# Patient Record
Sex: FEMALE | Race: WHITE | NOT HISPANIC OR LATINO
[De-identification: names, ages, dates, MRNs, and addresses within clinical notes are randomized per-mention and may not be internally consistent; named-entity substitution may affect disease eponyms.]

---

## 2017-06-14 ENCOUNTER — NON-APPOINTMENT (OUTPATIENT)
Age: 82
End: 2017-06-14

## 2017-06-14 ENCOUNTER — APPOINTMENT (OUTPATIENT)
Dept: CARDIOLOGY | Facility: CLINIC | Age: 82
End: 2017-06-14

## 2017-06-14 VITALS
WEIGHT: 117 LBS | BODY MASS INDEX: 19.97 KG/M2 | HEART RATE: 76 BPM | SYSTOLIC BLOOD PRESSURE: 110 MMHG | HEIGHT: 64 IN | DIASTOLIC BLOOD PRESSURE: 70 MMHG

## 2017-06-14 DIAGNOSIS — Z80.0 FAMILY HISTORY OF MALIGNANT NEOPLASM OF DIGESTIVE ORGANS: ICD-10-CM

## 2017-06-14 DIAGNOSIS — Z86.79 PERSONAL HISTORY OF OTHER DISEASES OF THE CIRCULATORY SYSTEM: ICD-10-CM

## 2017-06-14 DIAGNOSIS — Z78.9 OTHER SPECIFIED HEALTH STATUS: ICD-10-CM

## 2017-06-14 DIAGNOSIS — Z63.4 DISAPPEARANCE AND DEATH OF FAMILY MEMBER: ICD-10-CM

## 2017-06-14 DIAGNOSIS — Z86.39 PERSONAL HISTORY OF OTHER ENDOCRINE, NUTRITIONAL AND METABOLIC DISEASE: ICD-10-CM

## 2017-06-14 DIAGNOSIS — Z82.49 FAMILY HISTORY OF ISCHEMIC HEART DISEASE AND OTHER DISEASES OF THE CIRCULATORY SYSTEM: ICD-10-CM

## 2017-06-14 SDOH — SOCIAL STABILITY - SOCIAL INSECURITY: DISSAPEARANCE AND DEATH OF FAMILY MEMBER: Z63.4

## 2017-06-23 ENCOUNTER — EMERGENCY (EMERGENCY)
Facility: HOSPITAL | Age: 82
LOS: 1 days | Discharge: HOME | End: 2017-06-23
Attending: EMERGENCY MEDICINE | Admitting: EMERGENCY MEDICINE
Payer: MEDICARE

## 2017-06-23 VITALS
DIASTOLIC BLOOD PRESSURE: 98 MMHG | HEART RATE: 78 BPM | TEMPERATURE: 98 F | OXYGEN SATURATION: 97 % | RESPIRATION RATE: 18 BRPM | SYSTOLIC BLOOD PRESSURE: 164 MMHG

## 2017-06-23 VITALS
RESPIRATION RATE: 20 BRPM | HEART RATE: 83 BPM | TEMPERATURE: 97 F | SYSTOLIC BLOOD PRESSURE: 163 MMHG | DIASTOLIC BLOOD PRESSURE: 111 MMHG | OXYGEN SATURATION: 97 %

## 2017-06-23 LAB
ALBUMIN SERPL ELPH-MCNC: 4.6 G/DL — SIGNIFICANT CHANGE UP (ref 3.3–5)
ALP SERPL-CCNC: 58 U/L — SIGNIFICANT CHANGE UP (ref 40–120)
ALT FLD-CCNC: 16 U/L RC — SIGNIFICANT CHANGE UP (ref 10–45)
ANION GAP SERPL CALC-SCNC: 12 MMOL/L — SIGNIFICANT CHANGE UP (ref 5–17)
APTT BLD: 59.3 SEC — HIGH (ref 27.5–37.4)
AST SERPL-CCNC: 20 U/L — SIGNIFICANT CHANGE UP (ref 10–40)
BASOPHILS # BLD AUTO: 0 K/UL — SIGNIFICANT CHANGE UP (ref 0–0.2)
BASOPHILS NFR BLD AUTO: 0 % — SIGNIFICANT CHANGE UP (ref 0–2)
BILIRUB SERPL-MCNC: 0.6 MG/DL — SIGNIFICANT CHANGE UP (ref 0.2–1.2)
BUN SERPL-MCNC: 27 MG/DL — HIGH (ref 7–23)
CALCIUM SERPL-MCNC: 10.2 MG/DL — SIGNIFICANT CHANGE UP (ref 8.4–10.5)
CHLORIDE SERPL-SCNC: 100 MMOL/L — SIGNIFICANT CHANGE UP (ref 96–108)
CO2 SERPL-SCNC: 29 MMOL/L — SIGNIFICANT CHANGE UP (ref 22–31)
CREAT SERPL-MCNC: 0.83 MG/DL — SIGNIFICANT CHANGE UP (ref 0.5–1.3)
EOSINOPHIL # BLD AUTO: 0 K/UL — SIGNIFICANT CHANGE UP (ref 0–0.5)
EOSINOPHIL NFR BLD AUTO: 0.9 % — SIGNIFICANT CHANGE UP (ref 0–6)
GLUCOSE SERPL-MCNC: 100 MG/DL — HIGH (ref 70–99)
HCT VFR BLD CALC: 40.6 % — SIGNIFICANT CHANGE UP (ref 34.5–45)
HGB BLD-MCNC: 13.7 G/DL — SIGNIFICANT CHANGE UP (ref 11.5–15.5)
INR BLD: 7.03 RATIO — CRITICAL HIGH (ref 0.88–1.16)
LYMPHOCYTES # BLD AUTO: 1.4 K/UL — SIGNIFICANT CHANGE UP (ref 1–3.3)
LYMPHOCYTES # BLD AUTO: 25.7 % — SIGNIFICANT CHANGE UP (ref 13–44)
MCHC RBC-ENTMCNC: 32.3 PG — SIGNIFICANT CHANGE UP (ref 27–34)
MCHC RBC-ENTMCNC: 33.7 GM/DL — SIGNIFICANT CHANGE UP (ref 32–36)
MCV RBC AUTO: 96.1 FL — SIGNIFICANT CHANGE UP (ref 80–100)
MONOCYTES # BLD AUTO: 0.7 K/UL — SIGNIFICANT CHANGE UP (ref 0–0.9)
MONOCYTES NFR BLD AUTO: 12.7 % — SIGNIFICANT CHANGE UP (ref 2–14)
NEUTROPHILS # BLD AUTO: 3.2 K/UL — SIGNIFICANT CHANGE UP (ref 1.8–7.4)
NEUTROPHILS NFR BLD AUTO: 60.7 % — SIGNIFICANT CHANGE UP (ref 43–77)
PLATELET # BLD AUTO: 178 K/UL — SIGNIFICANT CHANGE UP (ref 150–400)
POTASSIUM SERPL-MCNC: 4.4 MMOL/L — SIGNIFICANT CHANGE UP (ref 3.5–5.3)
POTASSIUM SERPL-SCNC: 4.4 MMOL/L — SIGNIFICANT CHANGE UP (ref 3.5–5.3)
PROT SERPL-MCNC: 7.5 G/DL — SIGNIFICANT CHANGE UP (ref 6–8.3)
PROTHROM AB SERPL-ACNC: 79 SEC — HIGH (ref 9.8–12.7)
RBC # BLD: 4.22 M/UL — SIGNIFICANT CHANGE UP (ref 3.8–5.2)
RBC # FLD: 12.8 % — SIGNIFICANT CHANGE UP (ref 10.3–14.5)
SODIUM SERPL-SCNC: 141 MMOL/L — SIGNIFICANT CHANGE UP (ref 135–145)
WBC # BLD: 5.3 K/UL — SIGNIFICANT CHANGE UP (ref 3.8–10.5)
WBC # FLD AUTO: 5.3 K/UL — SIGNIFICANT CHANGE UP (ref 3.8–10.5)

## 2017-06-23 PROCEDURE — 85027 COMPLETE CBC AUTOMATED: CPT

## 2017-06-23 PROCEDURE — 80053 COMPREHEN METABOLIC PANEL: CPT

## 2017-06-23 PROCEDURE — 85610 PROTHROMBIN TIME: CPT

## 2017-06-23 PROCEDURE — 99284 EMERGENCY DEPT VISIT MOD MDM: CPT

## 2017-06-23 PROCEDURE — 85730 THROMBOPLASTIN TIME PARTIAL: CPT

## 2017-06-23 PROCEDURE — 99283 EMERGENCY DEPT VISIT LOW MDM: CPT

## 2017-06-23 NOTE — ED PROVIDER NOTE - SKIN, MLM
+ large ecchymosis at R anterior shin; Skin normal color for race, warm, dry and intact. No evidence of rash.

## 2017-06-23 NOTE — ED PROVIDER NOTE - ATTENDING CONTRIBUTION TO CARE
****ATTENDING**** 88yo f hx Mitral valve repair on coumadin pw elevated INR. States she has a machine at home and checks her INR, spoke to her PCP Nurse in [place] who told her to go the ER. Denies any GI bleed, weakness, noticed RLE bruising. States she feels fine, and wants to go home. On exam, Patient is awake,alert,oriented x 3. Patient is well appearing and in no acute distress. Patient's chest is clear to ausculation, +s1s2. Abdomen is soft nd/nt +BS. RLE + mild ecchymosis no hematoma, non tender. no surrounding infection. Discussed plan w Dr. Marshall hold dose for 2 days and check on Sunday and pt to follow up with him. Discussed patient at length to monitor for bleeding and return for any concern. Pt states she understands and wants to go home. States she has not been drinking enough fluids.

## 2017-06-23 NOTE — ED PROVIDER NOTE - PLAN OF CARE
Stop coumadin until follow up. Follow up with Dr. Marshall in office for repeat lab work and evaluation on Monday.   Follow up with your cardiologist this week.   Return to ER for any chest pain, dizziness, shortness of breath, bloody stool, blood in urine, or any other concerning symptoms. Stop coumadin for today and tomorrow. Check INR on Sunday (third day) - If elevated, return to ER. If INR within therapeutic range, follow up with Dr. Marshall in office on Monday.   Follow up with your cardiologist this week.   Return to ER for any chest pain, dizziness, shortness of breath, bloody stool, blood in urine, or any other concerning symptoms. Stop coumadin for today and tomorrow. Check INR on Sunday (third day) - If elevated, return to ER. If INR within therapeutic range, follow up with Dr. Marshall in office on Monday.   Follow up with your cardiologist this week.   Return to ER for elevated INR, any chest pain, dizziness, shortness of breath, bloody stool, blood in urine, or any other concerning symptoms.

## 2017-06-23 NOTE — ED PROVIDER NOTE - OBJECTIVE STATEMENT
87F with PMH open heart surgery 2003 on coumadin sent by PCP for elevated INR today. Reports INR of 7. Reports elevated INR 3 months ago requiring vitamin K. Admits to large bruise on RLE. Patient feeling well. Denies CP, palpitations, abd pain, hematuria, melena.    PCP Alireza Marshall 87F with PMH open heart surgery 2003 for MV repair on coumadin sent by PCP for elevated INR today. Reports INR of 7. Reports elevated INR 3 months ago requiring vitamin K. Admits to large bruise on RLE. Patient feeling well. Denies CP, palpitations, abd pain, hematuria, melena.    PCP Alireza Marshall

## 2017-06-23 NOTE — ED PROVIDER NOTE - MEDICAL DECISION MAKING DETAILS
****ATTENDING**** 86yo f hx Mitral valve repair on coumadin pw elevated INR. States she has a machine at home and checks her INR, spoke to her PCP Nurse in [place] who told her to go the ER. Denies any GI bleed, weakness, noticed RLE bruising. States she feels fine, and wants to go home. On exam, Patient is awake,alert,oriented x 3. Patient is well appearing and in no acute distress. Patient's chest is clear to ausculation, +s1s2. Abdomen is soft nd/nt +BS. RLE + mild ecchymosis no hematoma, non tender. no surrounding infection. Discussed plan w Dr. Marshall hold dose for 2 days and check on Sunday and pt to follow up with him. Discussed patient at length to monitor for bleeding and return for any concern. Pt states she understands and wants to go home. States she has not been drinking enough fluids.

## 2017-06-23 NOTE — ED PROVIDER NOTE - PROGRESS NOTE DETAILS
paged Dr. Marshall Pt reports she monitors INR at home and reports value to lab in [place] where her cardiologist is. Spoke with Dr. Marshall. Met patient for first time last week. Agrees with plan to hold coumadin and f/u with him in office Monday.  - Kristin Campos PA-C

## 2017-06-23 NOTE — ED PROVIDER NOTE - CARE PLAN
Principal Discharge DX:	Elevated INR  Instructions for follow-up, activity and diet:	Stop coumadin until follow up. Follow up with Dr. Marshall in office for repeat lab work and evaluation on Monday.   Follow up with your cardiologist this week.   Return to ER for any chest pain, dizziness, shortness of breath, bloody stool, blood in urine, or any other concerning symptoms. Principal Discharge DX:	Elevated INR  Instructions for follow-up, activity and diet:	Stop coumadin for today and tomorrow. Check INR on Sunday (third day) - If elevated, return to ER. If INR within therapeutic range, follow up with Dr. Marshall in office on Monday.   Follow up with your cardiologist this week.   Return to ER for any chest pain, dizziness, shortness of breath, bloody stool, blood in urine, or any other concerning symptoms. Principal Discharge DX:	Elevated INR  Instructions for follow-up, activity and diet:	Stop coumadin for today and tomorrow. Check INR on Sunday (third day) - If elevated, return to ER. If INR within therapeutic range, follow up with Dr. Marshall in office on Monday.   Follow up with your cardiologist this week.   Return to ER for elevated INR, any chest pain, dizziness, shortness of breath, bloody stool, blood in urine, or any other concerning symptoms.

## 2017-06-23 NOTE — ED ADULT NURSE NOTE - OBJECTIVE STATEMENT
87 yr old female PMHx HTN, open heart surgery in 2003 on coumadin presents here today for high INR. patient was getting her regular blood work and they sent her here. denies any chest pain, sob, headache, dizziness, weakness, abd pain, n/v/d, rectal bleeding, vaginal bleeding, or hematuria. patient feels fine and does not want to stay in the hospital because she will get sick. safety and comfort maintained.

## 2017-07-03 ENCOUNTER — APPOINTMENT (OUTPATIENT)
Dept: INTERNAL MEDICINE | Facility: CLINIC | Age: 82
End: 2017-07-03

## 2017-07-03 VITALS — SYSTOLIC BLOOD PRESSURE: 120 MMHG | DIASTOLIC BLOOD PRESSURE: 70 MMHG

## 2017-07-03 DIAGNOSIS — K46.9 UNSPECIFIED ABDOMINAL HERNIA W/OUT OBSTRUCTION OR GANGRENE: ICD-10-CM

## 2017-07-03 DIAGNOSIS — G89.29 DORSALGIA, UNSPECIFIED: ICD-10-CM

## 2017-07-03 DIAGNOSIS — M54.9 DORSALGIA, UNSPECIFIED: ICD-10-CM

## 2017-07-26 ENCOUNTER — APPOINTMENT (OUTPATIENT)
Dept: OPHTHALMOLOGY | Facility: CLINIC | Age: 82
End: 2017-07-26

## 2017-07-26 DIAGNOSIS — H53.2 DIPLOPIA: ICD-10-CM

## 2017-07-31 ENCOUNTER — APPOINTMENT (OUTPATIENT)
Dept: INTERNAL MEDICINE | Facility: CLINIC | Age: 82
End: 2017-07-31
Payer: MEDICARE

## 2017-07-31 VITALS — BODY MASS INDEX: 20.66 KG/M2 | WEIGHT: 121 LBS | HEIGHT: 64 IN

## 2017-07-31 VITALS
DIASTOLIC BLOOD PRESSURE: 75 MMHG | WEIGHT: 121 LBS | SYSTOLIC BLOOD PRESSURE: 135 MMHG | RESPIRATION RATE: 16 BRPM | HEART RATE: 70 BPM | BODY MASS INDEX: 20.77 KG/M2

## 2017-07-31 LAB — ACRM BINDING ANTIBODY: 0 NMOL/L

## 2017-07-31 PROCEDURE — 99214 OFFICE O/P EST MOD 30 MIN: CPT

## 2017-07-31 RX ORDER — ARTIFICIAL TEARS 1; 2; 3 MG/ML; MG/ML; MG/ML
SOLUTION/ DROPS OPHTHALMIC
Refills: 0 | Status: DISCONTINUED | COMMUNITY

## 2017-07-31 RX ORDER — LEVOTHYROXINE SODIUM 100 UG/1
100 TABLET ORAL
Qty: 90 | Refills: 3 | Status: DISCONTINUED | COMMUNITY
Start: 2017-06-14 | End: 2017-07-31

## 2017-08-01 ENCOUNTER — APPOINTMENT (OUTPATIENT)
Dept: OPHTHALMOLOGY | Facility: CLINIC | Age: 82
End: 2017-08-01
Payer: MEDICARE

## 2017-08-01 DIAGNOSIS — H04.123 DRY EYE SYNDROME OF BILATERAL LACRIMAL GLANDS: ICD-10-CM

## 2017-08-01 DIAGNOSIS — Z98.42 CATARACT EXTRACTION STATUS, LEFT EYE: ICD-10-CM

## 2017-08-01 DIAGNOSIS — Z98.41 CATARACT EXTRACTION STATUS, RIGHT EYE: ICD-10-CM

## 2017-08-01 DIAGNOSIS — Z96.1 PRESENCE OF INTRAOCULAR LENS: ICD-10-CM

## 2017-08-01 LAB — MUSK ANTIBODY TEST: NORMAL

## 2017-08-01 PROCEDURE — V2718: CPT

## 2017-08-01 PROCEDURE — 92012 INTRM OPH EXAM EST PATIENT: CPT

## 2017-08-02 LAB
ACHR BLOCK AB SER QL: <15
ACHR MOD AB SER-ACNC: 21

## 2017-09-05 ENCOUNTER — APPOINTMENT (OUTPATIENT)
Dept: CARDIOLOGY | Facility: CLINIC | Age: 82
End: 2017-09-05
Payer: MEDICARE

## 2017-09-05 VITALS
BODY MASS INDEX: 19.63 KG/M2 | HEIGHT: 64 IN | DIASTOLIC BLOOD PRESSURE: 70 MMHG | WEIGHT: 115 LBS | SYSTOLIC BLOOD PRESSURE: 118 MMHG

## 2017-09-05 PROCEDURE — 99215 OFFICE O/P EST HI 40 MIN: CPT

## 2017-09-05 PROCEDURE — 93000 ELECTROCARDIOGRAM COMPLETE: CPT

## 2017-09-29 ENCOUNTER — APPOINTMENT (OUTPATIENT)
Dept: INTERNAL MEDICINE | Facility: CLINIC | Age: 82
End: 2017-09-29
Payer: MEDICARE

## 2017-09-29 PROCEDURE — 36415 COLL VENOUS BLD VENIPUNCTURE: CPT

## 2017-10-01 LAB
25(OH)D3 SERPL-MCNC: 41.8 NG/ML
ALBUMIN SERPL ELPH-MCNC: 4.1 G/DL
ALP BLD-CCNC: 59 U/L
ALT SERPL-CCNC: 20 U/L
ANION GAP SERPL CALC-SCNC: 16 MMOL/L
APPEARANCE: CLEAR
AST SERPL-CCNC: 22 U/L
BASOPHILS # BLD AUTO: 0.02 K/UL
BASOPHILS NFR BLD AUTO: 0.5 %
BILIRUB SERPL-MCNC: 0.7 MG/DL
BILIRUBIN URINE: NEGATIVE
BLOOD URINE: NEGATIVE
BUN SERPL-MCNC: 24 MG/DL
CALCIUM SERPL-MCNC: 9.6 MG/DL
CHLORIDE SERPL-SCNC: 101 MMOL/L
CHOLEST SERPL-MCNC: 145 MG/DL
CHOLEST/HDLC SERPL: 2.1 RATIO
CO2 SERPL-SCNC: 24 MMOL/L
COLOR: YELLOW
CREAT SERPL-MCNC: 0.88 MG/DL
EOSINOPHIL # BLD AUTO: 0.07 K/UL
EOSINOPHIL NFR BLD AUTO: 1.6 %
GLUCOSE QUALITATIVE U: NORMAL MG/DL
GLUCOSE SERPL-MCNC: 99 MG/DL
HBA1C MFR BLD HPLC: 5.6 %
HCT VFR BLD CALC: 39.8 %
HDLC SERPL-MCNC: 68 MG/DL
HGB BLD-MCNC: 12.3 G/DL
IMM GRANULOCYTES NFR BLD AUTO: 0 %
KETONES URINE: NEGATIVE
LDLC SERPL CALC-MCNC: 56 MG/DL
LEUKOCYTE ESTERASE URINE: NEGATIVE
LYMPHOCYTES # BLD AUTO: 1.06 K/UL
LYMPHOCYTES NFR BLD AUTO: 24.5 %
MAN DIFF?: NORMAL
MCHC RBC-ENTMCNC: 29.3 PG
MCHC RBC-ENTMCNC: 30.9 GM/DL
MCV RBC AUTO: 94.8 FL
MONOCYTES # BLD AUTO: 0.26 K/UL
MONOCYTES NFR BLD AUTO: 6 %
NEUTROPHILS # BLD AUTO: 2.91 K/UL
NEUTROPHILS NFR BLD AUTO: 67.4 %
NITRITE URINE: NEGATIVE
PH URINE: 6.5
PLATELET # BLD AUTO: 180 K/UL
POTASSIUM SERPL-SCNC: 4.8 MMOL/L
PROT SERPL-MCNC: 7 G/DL
PROTEIN URINE: NEGATIVE MG/DL
RBC # BLD: 4.2 M/UL
RBC # FLD: 14.8 %
SODIUM SERPL-SCNC: 141 MMOL/L
SPECIFIC GRAVITY URINE: 1.01
T4 SERPL-MCNC: 7.7 UG/DL
TRIGL SERPL-MCNC: 107 MG/DL
TSH SERPL-ACNC: 1.4 UIU/ML
UROBILINOGEN URINE: NORMAL MG/DL
WBC # FLD AUTO: 4.32 K/UL

## 2017-10-09 ENCOUNTER — NON-APPOINTMENT (OUTPATIENT)
Age: 82
End: 2017-10-09

## 2017-10-09 ENCOUNTER — APPOINTMENT (OUTPATIENT)
Dept: INTERNAL MEDICINE | Facility: CLINIC | Age: 82
End: 2017-10-09
Payer: MEDICARE

## 2017-10-09 VITALS
DIASTOLIC BLOOD PRESSURE: 70 MMHG | HEIGHT: 63 IN | RESPIRATION RATE: 16 BRPM | BODY MASS INDEX: 20.73 KG/M2 | HEART RATE: 70 BPM | WEIGHT: 117 LBS | SYSTOLIC BLOOD PRESSURE: 130 MMHG

## 2017-10-09 DIAGNOSIS — Z23 ENCOUNTER FOR IMMUNIZATION: ICD-10-CM

## 2017-10-09 DIAGNOSIS — Z84.89 FAMILY HISTORY OF OTHER SPECIFIED CONDITIONS: ICD-10-CM

## 2017-10-09 DIAGNOSIS — B19.20 UNSPECIFIED VIRAL HEPATITIS C W/OUT HEPATIC COMA: ICD-10-CM

## 2017-10-09 DIAGNOSIS — Z80.8 FAMILY HISTORY OF MALIGNANT NEOPLASM OF OTHER ORGANS OR SYSTEMS: ICD-10-CM

## 2017-10-09 DIAGNOSIS — H90.5 UNSPECIFIED SENSORINEURAL HEARING LOSS: ICD-10-CM

## 2017-10-09 PROCEDURE — 99497 ADVNCD CARE PLAN 30 MIN: CPT | Mod: 33

## 2017-10-09 PROCEDURE — G0439: CPT

## 2017-10-09 PROCEDURE — G0008: CPT

## 2017-10-09 PROCEDURE — 90662 IIV NO PRSV INCREASED AG IM: CPT

## 2017-10-09 PROCEDURE — 99213 OFFICE O/P EST LOW 20 MIN: CPT | Mod: 25

## 2017-10-09 PROCEDURE — 93000 ELECTROCARDIOGRAM COMPLETE: CPT

## 2017-10-09 PROCEDURE — 36415 COLL VENOUS BLD VENIPUNCTURE: CPT

## 2017-10-10 LAB
AFP-TM SERPL-MCNC: 4.5 NG/ML
HAV IGG+IGM SER QL: NONREACTIVE
HBV CORE IGG+IGM SER QL: NONREACTIVE
HBV SURFACE AB SER QL: NONREACTIVE
HBV SURFACE AG SER QL: NONREACTIVE
HCV AB SER QL: NONREACTIVE
HCV S/CO RATIO: 0.09 S/CO

## 2017-10-11 LAB
HCV RNA SERPL NAA DL=5-ACNC: NOT DETECTED IU/ML
HCV RNA SERPL NAA+PROBE-LOG IU: NOT DETECTED LOGIU/ML

## 2018-05-14 ENCOUNTER — MEDICATION RENEWAL (OUTPATIENT)
Age: 83
End: 2018-05-14

## 2018-05-17 ENCOUNTER — RX RENEWAL (OUTPATIENT)
Age: 83
End: 2018-05-17

## 2018-05-18 ENCOUNTER — EMERGENCY (EMERGENCY)
Facility: HOSPITAL | Age: 83
LOS: 1 days | Discharge: HOME | End: 2018-05-18
Attending: EMERGENCY MEDICINE
Payer: MEDICARE

## 2018-05-18 ENCOUNTER — NON-APPOINTMENT (OUTPATIENT)
Age: 83
End: 2018-05-18

## 2018-05-18 ENCOUNTER — APPOINTMENT (OUTPATIENT)
Dept: INTERNAL MEDICINE | Facility: CLINIC | Age: 83
End: 2018-05-18
Payer: MEDICARE

## 2018-05-18 VITALS — SYSTOLIC BLOOD PRESSURE: 120 MMHG | RESPIRATION RATE: 16 BRPM | DIASTOLIC BLOOD PRESSURE: 70 MMHG | HEART RATE: 76 BPM

## 2018-05-18 VITALS
DIASTOLIC BLOOD PRESSURE: 87 MMHG | TEMPERATURE: 98 F | RESPIRATION RATE: 17 BRPM | SYSTOLIC BLOOD PRESSURE: 143 MMHG | HEART RATE: 81 BPM | OXYGEN SATURATION: 96 %

## 2018-05-18 VITALS
SYSTOLIC BLOOD PRESSURE: 162 MMHG | DIASTOLIC BLOOD PRESSURE: 85 MMHG | RESPIRATION RATE: 17 BRPM | OXYGEN SATURATION: 99 % | HEART RATE: 96 BPM | TEMPERATURE: 99 F

## 2018-05-18 DIAGNOSIS — M54.5 LOW BACK PAIN: ICD-10-CM

## 2018-05-18 DIAGNOSIS — J30.2 OTHER SEASONAL ALLERGIC RHINITIS: ICD-10-CM

## 2018-05-18 DIAGNOSIS — G89.29 LOW BACK PAIN: ICD-10-CM

## 2018-05-18 DIAGNOSIS — Z95.2 PRESENCE OF PROSTHETIC HEART VALVE: Chronic | ICD-10-CM

## 2018-05-18 LAB
ALBUMIN SERPL ELPH-MCNC: 4.3 G/DL — SIGNIFICANT CHANGE UP (ref 3.3–5)
ALP SERPL-CCNC: 52 U/L — SIGNIFICANT CHANGE UP (ref 40–120)
ALT FLD-CCNC: 15 U/L — SIGNIFICANT CHANGE UP (ref 10–45)
ANION GAP SERPL CALC-SCNC: 11 MMOL/L — SIGNIFICANT CHANGE UP (ref 5–17)
APTT BLD: 36.7 SEC — SIGNIFICANT CHANGE UP (ref 27.5–37.4)
AST SERPL-CCNC: 17 U/L — SIGNIFICANT CHANGE UP (ref 10–40)
BASOPHILS # BLD AUTO: 0 K/UL — SIGNIFICANT CHANGE UP (ref 0–0.2)
BASOPHILS NFR BLD AUTO: 0.2 % — SIGNIFICANT CHANGE UP (ref 0–2)
BILIRUB SERPL-MCNC: 0.9 MG/DL — SIGNIFICANT CHANGE UP (ref 0.2–1.2)
BUN SERPL-MCNC: 19 MG/DL — SIGNIFICANT CHANGE UP (ref 7–23)
CALCIUM SERPL-MCNC: 9.7 MG/DL — SIGNIFICANT CHANGE UP (ref 8.4–10.5)
CHLORIDE SERPL-SCNC: 99 MMOL/L — SIGNIFICANT CHANGE UP (ref 96–108)
CO2 SERPL-SCNC: 26 MMOL/L — SIGNIFICANT CHANGE UP (ref 22–31)
CREAT SERPL-MCNC: 0.82 MG/DL — SIGNIFICANT CHANGE UP (ref 0.5–1.3)
DIGOXIN SERPL-MCNC: 0.6 NG/ML — LOW (ref 0.8–2)
EOSINOPHIL # BLD AUTO: 0 K/UL — SIGNIFICANT CHANGE UP (ref 0–0.5)
EOSINOPHIL NFR BLD AUTO: 0.5 % — SIGNIFICANT CHANGE UP (ref 0–6)
GAS PNL BLDV: SIGNIFICANT CHANGE UP
GLUCOSE SERPL-MCNC: 174 MG/DL — HIGH (ref 70–99)
HCT VFR BLD CALC: 43.6 % — SIGNIFICANT CHANGE UP (ref 34.5–45)
HGB BLD-MCNC: 14.1 G/DL — SIGNIFICANT CHANGE UP (ref 11.5–15.5)
INR BLD: 2.41 RATIO — HIGH (ref 0.88–1.16)
LYMPHOCYTES # BLD AUTO: 1.2 K/UL — SIGNIFICANT CHANGE UP (ref 1–3.3)
LYMPHOCYTES # BLD AUTO: 17.3 % — SIGNIFICANT CHANGE UP (ref 13–44)
MAGNESIUM SERPL-MCNC: 1.9 MG/DL — SIGNIFICANT CHANGE UP (ref 1.6–2.6)
MCHC RBC-ENTMCNC: 31.7 PG — SIGNIFICANT CHANGE UP (ref 27–34)
MCHC RBC-ENTMCNC: 32.4 GM/DL — SIGNIFICANT CHANGE UP (ref 32–36)
MCV RBC AUTO: 97.6 FL — SIGNIFICANT CHANGE UP (ref 80–100)
MONOCYTES # BLD AUTO: 0.5 K/UL — SIGNIFICANT CHANGE UP (ref 0–0.9)
MONOCYTES NFR BLD AUTO: 6.9 % — SIGNIFICANT CHANGE UP (ref 2–14)
NEUTROPHILS # BLD AUTO: 5.1 K/UL — SIGNIFICANT CHANGE UP (ref 1.8–7.4)
NEUTROPHILS NFR BLD AUTO: 75.1 % — SIGNIFICANT CHANGE UP (ref 43–77)
NT-PROBNP SERPL-SCNC: 4168 PG/ML — HIGH (ref 0–300)
PHOSPHATE SERPL-MCNC: 2.3 MG/DL — LOW (ref 2.5–4.5)
PLATELET # BLD AUTO: 175 K/UL — SIGNIFICANT CHANGE UP (ref 150–400)
POTASSIUM SERPL-MCNC: 3.9 MMOL/L — SIGNIFICANT CHANGE UP (ref 3.5–5.3)
POTASSIUM SERPL-SCNC: 3.9 MMOL/L — SIGNIFICANT CHANGE UP (ref 3.5–5.3)
PROT SERPL-MCNC: 7.4 G/DL — SIGNIFICANT CHANGE UP (ref 6–8.3)
PROTHROM AB SERPL-ACNC: 26.7 SEC — HIGH (ref 9.8–12.7)
RBC # BLD: 4.47 M/UL — SIGNIFICANT CHANGE UP (ref 3.8–5.2)
RBC # FLD: 12.5 % — SIGNIFICANT CHANGE UP (ref 10.3–14.5)
SODIUM SERPL-SCNC: 136 MMOL/L — SIGNIFICANT CHANGE UP (ref 135–145)
TROPONIN T SERPL-MCNC: <0.01 NG/ML — SIGNIFICANT CHANGE UP (ref 0–0.06)
WBC # BLD: 6.8 K/UL — SIGNIFICANT CHANGE UP (ref 3.8–10.5)
WBC # FLD AUTO: 6.8 K/UL — SIGNIFICANT CHANGE UP (ref 3.8–10.5)

## 2018-05-18 PROCEDURE — 71045 X-RAY EXAM CHEST 1 VIEW: CPT

## 2018-05-18 PROCEDURE — 83735 ASSAY OF MAGNESIUM: CPT

## 2018-05-18 PROCEDURE — 93010 ELECTROCARDIOGRAM REPORT: CPT

## 2018-05-18 PROCEDURE — 84484 ASSAY OF TROPONIN QUANT: CPT

## 2018-05-18 PROCEDURE — 82947 ASSAY GLUCOSE BLOOD QUANT: CPT

## 2018-05-18 PROCEDURE — 93005 ELECTROCARDIOGRAM TRACING: CPT

## 2018-05-18 PROCEDURE — 85014 HEMATOCRIT: CPT

## 2018-05-18 PROCEDURE — 85610 PROTHROMBIN TIME: CPT

## 2018-05-18 PROCEDURE — 93000 ELECTROCARDIOGRAM COMPLETE: CPT

## 2018-05-18 PROCEDURE — 80053 COMPREHEN METABOLIC PANEL: CPT

## 2018-05-18 PROCEDURE — 83605 ASSAY OF LACTIC ACID: CPT

## 2018-05-18 PROCEDURE — 71045 X-RAY EXAM CHEST 1 VIEW: CPT | Mod: 26

## 2018-05-18 PROCEDURE — 82435 ASSAY OF BLOOD CHLORIDE: CPT

## 2018-05-18 PROCEDURE — 85027 COMPLETE CBC AUTOMATED: CPT

## 2018-05-18 PROCEDURE — 84132 ASSAY OF SERUM POTASSIUM: CPT

## 2018-05-18 PROCEDURE — 82330 ASSAY OF CALCIUM: CPT

## 2018-05-18 PROCEDURE — 80162 ASSAY OF DIGOXIN TOTAL: CPT

## 2018-05-18 PROCEDURE — 85730 THROMBOPLASTIN TIME PARTIAL: CPT

## 2018-05-18 PROCEDURE — 99284 EMERGENCY DEPT VISIT MOD MDM: CPT | Mod: 25

## 2018-05-18 PROCEDURE — 99214 OFFICE O/P EST MOD 30 MIN: CPT | Mod: 25

## 2018-05-18 PROCEDURE — 84295 ASSAY OF SERUM SODIUM: CPT

## 2018-05-18 PROCEDURE — 93288 INTERROG EVL PM/LDLS PM IP: CPT | Mod: 26

## 2018-05-18 PROCEDURE — 83880 ASSAY OF NATRIURETIC PEPTIDE: CPT

## 2018-05-18 PROCEDURE — 84100 ASSAY OF PHOSPHORUS: CPT

## 2018-05-18 PROCEDURE — 82803 BLOOD GASES ANY COMBINATION: CPT

## 2018-05-18 PROCEDURE — 99285 EMERGENCY DEPT VISIT HI MDM: CPT | Mod: 25,GC

## 2018-05-18 RX ORDER — SODIUM CHLORIDE 9 MG/ML
500 INJECTION INTRAMUSCULAR; INTRAVENOUS; SUBCUTANEOUS ONCE
Qty: 0 | Refills: 0 | Status: COMPLETED | OUTPATIENT
Start: 2018-05-18 | End: 2018-05-18

## 2018-05-18 RX ADMIN — SODIUM CHLORIDE 500 MILLILITER(S): 9 INJECTION INTRAMUSCULAR; INTRAVENOUS; SUBCUTANEOUS at 14:21

## 2018-05-18 NOTE — ED PROVIDER NOTE - MEDICAL DECISION MAKING DETAILS
88yo F with palpitations, chest discomfort, diarrhoea. Cardiac workup and device interrogation without abnormal findings of concern. Pt clinically well, no further episodes of discomfort or diarrhoea. Pt discussed with Cardiologist Dr Marshall. Discharge home with cardiology follow up.

## 2018-05-18 NOTE — ED ADULT NURSE NOTE - OBJECTIVE STATEMENT
Pt is an ambulatory 87 yr old female a/oX 3 c/o weakness and several episodes of diarrhea last night.  Was seen at cardiologist this morning and told she had abnormal EKG and to come to ED.  PErrl wnl, anthony withe qual strength.  Sinus rhythm with pacemaker on cm at 87 bpm.  Pt states she was having palpitations and chest pain last night at rest when diarrhea was happening.  No N/v/F.  Abdomen NT ND with BS+4Q.  SKIN WDI.  PEripehral pulses +2bl non pitting edema

## 2018-05-18 NOTE — ED PROVIDER NOTE - ATTENDING CONTRIBUTION TO CARE
attending Michela: 87yF h/o MV surgery 2003, PPM 2001, presents after episode of palpitations and chest discomfort last night x several hours. Also with 1 day of loose stools, nonbloody. Asymptomatic in ED. Sent in for eval by PMD [facility] for "abnormal EKG". Reports normal stress test in [place] last month. Denies recent abx, vomiting, abdominal pain. Does endorse generalized weakness, pt believes to dehydration. On exam, well-appearing, NAD, MMM, pink conjunctiva, abdomen soft/NT, normal gait. Will obtain ekg, place on tele, labs including cardiac enzymes/lytes, device interrogation, discuss with cardiology and reassess.

## 2018-05-18 NOTE — ED ADULT NURSE REASSESSMENT NOTE - NS ED NURSE REASSESS COMMENT FT1
Received report from [facility]. Pt A&Ox3, resting, VS stable. Safety checked. No c/o pain or discomfort at this time. Comfort care provided. Pt encouraged to call for assist when needed. pending xray and blood results

## 2018-05-18 NOTE — ASSESSMENT
[FreeTextEntry1] : Pt with above problems\par Last night did not feel well with diarrhea and vague chest discomfort\par EKG with new changes\par To go to ER - Notified\par

## 2018-05-18 NOTE — REVIEW OF SYSTEMS
[Palpitations] : palpitations [Diarrhea] : diarrhea [Negative] : Genitourinary [Chest Pain] : no chest pain

## 2018-05-18 NOTE — ED PROVIDER NOTE - NS ED ROS FT
REVIEW OF SYSTEMS:    CONSTITUTIONAL: +weakness, no fevers or chills  EYES/ENT: No visual changes;  No vertigo or throat pain   NECK: No pain or stiffness  RESPIRATORY: +cough, no wheezing, hemoptysis; No shortness of breath  CARDIOVASCULAR: +chest pain +palpitations  GASTROINTESTINAL: No abdominal or epigastric pain. No nausea, vomiting, or hematemesis; +diarrhea, no constipation. No melena or hematochezia.  GENITOURINARY: No dysuria, frequency or hematuria  NEUROLOGICAL: No numbness or weakness  PSYCH: AAO x 3, normal mood and affect  SKIN: No rashes or lesions

## 2018-05-18 NOTE — PROCEDURE NOTE - ADDITIONAL PROCEDURE DETAILS
Indication: pt c/o palpitation last night and this morning on 5/18  - presenting rhythm: A-paced, V-sensed at rate 70s-80s  - measured data: WNL (atrial amplitude not able to measure), normal PPM function (no Atrial threshold measured due to high atrial rate)  - Between 1/2/2018 and 5/17/2018, stored data revealed  multiple high atrial rate 150 bpm to > 400 bpm lasting 1min 57 sec to 12 hrs 52min 53 sec, 1 episode of high ventricular rate at 167 bpm on 1/22/2018 lasting 4 sec, available EGMs c/w Afib with RVR.   - changes made: none     Sharan-Monica White   # [id number]

## 2018-05-18 NOTE — HISTORY OF PRESENT ILLNESS
[FreeTextEntry1] : FU for hypertension, hyperlipidemia, chronic low back pain, anxiety - insomnia\par Bad allergies right now\par Last night did not feel well - diarrhea and unclear if she had any chest discomfort.

## 2018-05-18 NOTE — ED PROVIDER NOTE - PROGRESS NOTE DETAILS
labs, CXR, cardiac enzymes all normal/acceptable. No further episodes of diarrhoea or chest pain/palpitations. PPM interrogated with no concerning findings, no events related to symptoms. Discussed with Cardiologist Dr Marshall. Will discharge with Cardiology follow up this week. attending Michela: results reviewed with patient and cardiologist Joanna. Pt offered observation/admission for symptoms but patient declines, wants to go home. Will dc with copy of results, close cardiology follow-up and strict return precautions.

## 2018-05-18 NOTE — ED PROVIDER NOTE - OBJECTIVE STATEMENT
88 yo F PHx MV surgery 2003, PPM 2001, hayfever, OA, pw palpitations and chest discomfort lasting for hours from 4pm yesterday, she took a tablet of some kind, and then a  sleeping tablet before bed. Overnight she has 8 episodes of diarrhoea and the palpitations had resolved. No abdominal pain, and no hematochezia. Recent cardiac stress test in April in [place]. No previous MI, no DM, no HTN, no HCL, no FHx of heart disease. Pt saw her internist today who referred her for EKG that was different - EKG is paced. Pt feels weak, has ongoing diarrhoea. No current CP/palpitations, no SOB, diaphoresis, nausea or vomiting.    Meds: digoxin, coumadin, furosemide, KCl, metoprolol, tramadol, atorvastatin, pantoprazole, vesicare, lorazepam, levothyroxine, Flonase +unspecified med to take with palpitations  IM Bipin Luque  Cardio Jose Marshall

## 2018-05-18 NOTE — ED PROVIDER NOTE - CHIEF COMPLAINT
The patient is a 87y Female complaining of arm pain/injury. The patient is a 87y Female complaining of palpitations and diarrhoea.

## 2018-05-18 NOTE — ED PROVIDER NOTE - CARDIAC, MLM
Normal rate, irregular rhythm.  Heart sounds S1, S2.  No murmurs, rubs or gallops. No JVD, +1 edema bilaterally

## 2018-05-18 NOTE — PHYSICAL EXAM
[No Acute Distress] : no acute distress [Well Nourished] : well nourished [Well Developed] : well developed [Normal Sclera/Conjunctiva] : normal sclera/conjunctiva [Normal Oropharynx] : the oropharynx was normal [No JVD] : no jugular venous distention [Supple] : supple [No Lymphadenopathy] : no lymphadenopathy [No Respiratory Distress] : no respiratory distress  [Clear to Auscultation] : lungs were clear to auscultation bilaterally [No Accessory Muscle Use] : no accessory muscle use [Normal S1, S2] : normal S1 and S2 [No Murmur] : no murmur heard [No Edema] : there was no peripheral edema [Soft] : abdomen soft [No HSM] : no HSM [No Focal Deficits] : no focal deficits [Alert and Oriented x3] : oriented to person, place, and time

## 2018-05-21 ENCOUNTER — MEDICATION RENEWAL (OUTPATIENT)
Age: 83
End: 2018-05-21

## 2018-05-21 ENCOUNTER — RX RENEWAL (OUTPATIENT)
Age: 83
End: 2018-05-21

## 2018-06-14 ENCOUNTER — NON-APPOINTMENT (OUTPATIENT)
Age: 83
End: 2018-06-14

## 2018-06-14 ENCOUNTER — APPOINTMENT (OUTPATIENT)
Dept: CARDIOLOGY | Facility: CLINIC | Age: 83
End: 2018-06-14
Payer: MEDICARE

## 2018-06-14 VITALS
HEART RATE: 76 BPM | DIASTOLIC BLOOD PRESSURE: 80 MMHG | SYSTOLIC BLOOD PRESSURE: 142 MMHG | BODY MASS INDEX: 20.37 KG/M2 | WEIGHT: 115 LBS

## 2018-06-14 PROCEDURE — 93000 ELECTROCARDIOGRAM COMPLETE: CPT

## 2018-06-14 PROCEDURE — 99214 OFFICE O/P EST MOD 30 MIN: CPT

## 2018-06-15 ENCOUNTER — APPOINTMENT (OUTPATIENT)
Dept: INTERNAL MEDICINE | Facility: CLINIC | Age: 83
End: 2018-06-15
Payer: MEDICARE

## 2018-06-15 ENCOUNTER — APPOINTMENT (OUTPATIENT)
Dept: INTERNAL MEDICINE | Facility: CLINIC | Age: 83
End: 2018-06-15

## 2018-06-15 DIAGNOSIS — Z23 ENCOUNTER FOR IMMUNIZATION: ICD-10-CM

## 2018-06-15 PROCEDURE — 90471 IMMUNIZATION ADMIN: CPT | Mod: GY

## 2018-06-15 PROCEDURE — 90714 TD VACC NO PRESV 7 YRS+ IM: CPT | Mod: GY

## 2018-06-26 ENCOUNTER — OUTPATIENT (OUTPATIENT)
Dept: OUTPATIENT SERVICES | Facility: HOSPITAL | Age: 83
LOS: 1 days | End: 2018-06-26
Payer: MEDICARE

## 2018-06-26 ENCOUNTER — APPOINTMENT (OUTPATIENT)
Dept: RADIOLOGY | Facility: IMAGING CENTER | Age: 83
End: 2018-06-26
Payer: MEDICARE

## 2018-06-26 ENCOUNTER — MEDICATION RENEWAL (OUTPATIENT)
Age: 83
End: 2018-06-26

## 2018-06-26 DIAGNOSIS — Z00.8 ENCOUNTER FOR OTHER GENERAL EXAMINATION: ICD-10-CM

## 2018-06-26 DIAGNOSIS — Z95.2 PRESENCE OF PROSTHETIC HEART VALVE: Chronic | ICD-10-CM

## 2018-06-26 PROCEDURE — 73120 X-RAY EXAM OF HAND: CPT

## 2018-06-26 PROCEDURE — 73120 X-RAY EXAM OF HAND: CPT | Mod: 26,50

## 2018-07-03 ENCOUNTER — MEDICATION RENEWAL (OUTPATIENT)
Age: 83
End: 2018-07-03

## 2018-07-17 PROBLEM — Z95.0 PRESENCE OF CARDIAC PACEMAKER: Chronic | Status: ACTIVE | Noted: 2018-05-18

## 2018-07-25 PROBLEM — Z80.0 FAMILY HISTORY OF COLON CANCER: Status: ACTIVE | Noted: 2017-06-14

## 2018-07-25 PROBLEM — J30.2 SEASONAL ALLERGIES: Status: ACTIVE | Noted: 2018-05-18

## 2018-07-25 PROBLEM — M19.90 UNSPECIFIED OSTEOARTHRITIS, UNSPECIFIED SITE: Chronic | Status: ACTIVE | Noted: 2018-05-18

## 2018-07-30 ENCOUNTER — TRANSCRIPTION ENCOUNTER (OUTPATIENT)
Age: 83
End: 2018-07-30

## 2018-07-31 ENCOUNTER — TRANSCRIPTION ENCOUNTER (OUTPATIENT)
Age: 83
End: 2018-07-31

## 2018-08-06 ENCOUNTER — MEDICATION RENEWAL (OUTPATIENT)
Age: 83
End: 2018-08-06

## 2018-08-13 ENCOUNTER — APPOINTMENT (OUTPATIENT)
Dept: INTERNAL MEDICINE | Facility: CLINIC | Age: 83
End: 2018-08-13
Payer: MEDICARE

## 2018-08-13 VITALS — BODY MASS INDEX: 20.73 KG/M2 | WEIGHT: 117 LBS | HEIGHT: 63 IN

## 2018-08-13 VITALS — SYSTOLIC BLOOD PRESSURE: 140 MMHG | HEART RATE: 72 BPM | DIASTOLIC BLOOD PRESSURE: 80 MMHG | RESPIRATION RATE: 14 BRPM

## 2018-08-13 PROCEDURE — 85610 PROTHROMBIN TIME: CPT | Mod: QW

## 2018-08-13 PROCEDURE — 99213 OFFICE O/P EST LOW 20 MIN: CPT | Mod: 25

## 2018-08-13 PROCEDURE — 36415 COLL VENOUS BLD VENIPUNCTURE: CPT

## 2018-08-13 NOTE — HISTORY OF PRESENT ILLNESS
[FreeTextEntry8] : Pt presents for evaluation of black stool for one day. Stool was "partially black" yesterday, and black this AM\par NO abdominal pain.\par No BRBPR.\par No fever/chills.\par INR on Wed was 2.3.\par NO trauma.\par NO change in diet.\par Pt brought in sample of stool.

## 2018-08-13 NOTE — ASSESSMENT
[FreeTextEntry1] : Blood work was drawn and sent to the lab today. The patient has been instructed to call the office Wed. to discuss today's lab work.\par \par Stool sample brought in today was negative for blood on guaic testing.\par \par Observe for now.\par \par F/U if symptoms do not resolve, or if they should change/worsen.\par \par

## 2018-08-13 NOTE — PHYSICAL EXAM
[No Acute Distress] : no acute distress [Supple] : supple [No Respiratory Distress] : no respiratory distress  [Clear to Auscultation] : lungs were clear to auscultation bilaterally [No Accessory Muscle Use] : no accessory muscle use [Normal Rate] : normal rate  [Regular Rhythm] : with a regular rhythm [Normal S1, S2] : normal S1 and S2 [No Edema] : there was no peripheral edema [Soft] : abdomen soft [Non Tender] : non-tender [Non-distended] : non-distended [No HSM] : no HSM [Normal Bowel Sounds] : normal bowel sounds

## 2018-08-14 LAB
BASOPHILS # BLD AUTO: 0.02 K/UL
BASOPHILS NFR BLD AUTO: 0.3 %
EOSINOPHIL # BLD AUTO: 0.03 K/UL
EOSINOPHIL NFR BLD AUTO: 0.5 %
HCT VFR BLD CALC: 43.6 %
HGB BLD-MCNC: 13 G/DL
IMM GRANULOCYTES NFR BLD AUTO: 0.2 %
LYMPHOCYTES # BLD AUTO: 1.02 K/UL
LYMPHOCYTES NFR BLD AUTO: 17 %
MAN DIFF?: NORMAL
MCHC RBC-ENTMCNC: 28.8 PG
MCHC RBC-ENTMCNC: 29.8 GM/DL
MCV RBC AUTO: 96.7 FL
MONOCYTES # BLD AUTO: 0.72 K/UL
MONOCYTES NFR BLD AUTO: 12 %
NEUTROPHILS # BLD AUTO: 4.19 K/UL
NEUTROPHILS NFR BLD AUTO: 70 %
PLATELET # BLD AUTO: 206 K/UL
RBC # BLD: 4.51 M/UL
RBC # FLD: 15.7 %
WBC # FLD AUTO: 5.99 K/UL

## 2018-08-22 ENCOUNTER — RX RENEWAL (OUTPATIENT)
Age: 83
End: 2018-08-22

## 2018-08-23 ENCOUNTER — RX RENEWAL (OUTPATIENT)
Age: 83
End: 2018-08-23

## 2018-08-28 ENCOUNTER — APPOINTMENT (OUTPATIENT)
Dept: DERMATOLOGY | Facility: CLINIC | Age: 83
End: 2018-08-28
Payer: MEDICARE

## 2018-08-28 VITALS
HEIGHT: 63 IN | WEIGHT: 117 LBS | SYSTOLIC BLOOD PRESSURE: 100 MMHG | BODY MASS INDEX: 20.73 KG/M2 | DIASTOLIC BLOOD PRESSURE: 80 MMHG

## 2018-08-28 DIAGNOSIS — Z12.83 ENCOUNTER FOR SCREENING FOR MALIGNANT NEOPLASM OF SKIN: ICD-10-CM

## 2018-08-28 DIAGNOSIS — L72.0 EPIDERMAL CYST: ICD-10-CM

## 2018-08-28 DIAGNOSIS — Z87.09 PERSONAL HISTORY OF OTHER DISEASES OF THE RESPIRATORY SYSTEM: ICD-10-CM

## 2018-08-28 DIAGNOSIS — L57.0 ACTINIC KERATOSIS: ICD-10-CM

## 2018-08-28 DIAGNOSIS — Z80.8 FAMILY HISTORY OF MALIGNANT NEOPLASM OF OTHER ORGANS OR SYSTEMS: ICD-10-CM

## 2018-08-28 DIAGNOSIS — Z91.89 OTHER SPECIFIED PERSONAL RISK FACTORS, NOT ELSEWHERE CLASSIFIED: ICD-10-CM

## 2018-08-28 DIAGNOSIS — Z85.820 PERSONAL HISTORY OF MALIGNANT MELANOMA OF SKIN: ICD-10-CM

## 2018-08-28 DIAGNOSIS — Z80.9 FAMILY HISTORY OF MALIGNANT NEOPLASM, UNSPECIFIED: ICD-10-CM

## 2018-08-28 DIAGNOSIS — H91.90 UNSPECIFIED HEARING LOSS, UNSPECIFIED EAR: ICD-10-CM

## 2018-08-28 DIAGNOSIS — L82.1 OTHER SEBORRHEIC KERATOSIS: ICD-10-CM

## 2018-08-28 DIAGNOSIS — D18.01 HEMANGIOMA OF SKIN AND SUBCUTANEOUS TISSUE: ICD-10-CM

## 2018-08-28 DIAGNOSIS — Z86.79 PERSONAL HISTORY OF OTHER DISEASES OF THE CIRCULATORY SYSTEM: ICD-10-CM

## 2018-08-28 DIAGNOSIS — Z87.39 PERSONAL HISTORY OF OTHER DISEASES OF THE MUSCULOSKELETAL SYSTEM AND CONNECTIVE TISSUE: ICD-10-CM

## 2018-08-28 DIAGNOSIS — H54.7 UNSPECIFIED VISUAL LOSS: ICD-10-CM

## 2018-08-28 PROCEDURE — 99204 OFFICE O/P NEW MOD 45 MIN: CPT

## 2018-09-01 ENCOUNTER — MEDICATION RENEWAL (OUTPATIENT)
Age: 83
End: 2018-09-01

## 2018-09-20 ENCOUNTER — MEDICATION RENEWAL (OUTPATIENT)
Age: 83
End: 2018-09-20

## 2018-09-20 ENCOUNTER — RX RENEWAL (OUTPATIENT)
Age: 83
End: 2018-09-20

## 2018-09-28 ENCOUNTER — RX RENEWAL (OUTPATIENT)
Age: 83
End: 2018-09-28

## 2018-10-02 ENCOUNTER — APPOINTMENT (OUTPATIENT)
Dept: CARDIOLOGY | Facility: CLINIC | Age: 83
End: 2018-10-02
Payer: MEDICARE

## 2018-10-02 ENCOUNTER — RX RENEWAL (OUTPATIENT)
Age: 83
End: 2018-10-02

## 2018-10-02 VITALS
HEART RATE: 82 BPM | DIASTOLIC BLOOD PRESSURE: 68 MMHG | HEIGHT: 63 IN | TEMPERATURE: 97.7 F | OXYGEN SATURATION: 95 % | SYSTOLIC BLOOD PRESSURE: 140 MMHG | WEIGHT: 121 LBS | BODY MASS INDEX: 21.44 KG/M2

## 2018-10-02 VITALS — SYSTOLIC BLOOD PRESSURE: 128 MMHG | DIASTOLIC BLOOD PRESSURE: 70 MMHG

## 2018-10-02 PROCEDURE — 93280 PM DEVICE PROGR EVAL DUAL: CPT

## 2018-10-02 PROCEDURE — 99214 OFFICE O/P EST MOD 30 MIN: CPT

## 2018-10-05 ENCOUNTER — APPOINTMENT (OUTPATIENT)
Dept: DERMATOLOGY | Facility: CLINIC | Age: 83
End: 2018-10-05
Payer: MEDICARE

## 2018-10-05 VITALS — SYSTOLIC BLOOD PRESSURE: 110 MMHG | DIASTOLIC BLOOD PRESSURE: 70 MMHG

## 2018-10-05 PROCEDURE — 99213 OFFICE O/P EST LOW 20 MIN: CPT

## 2018-10-05 RX ORDER — FLUOCINONIDE 0.5 MG/ML
0.05 SOLUTION TOPICAL
Qty: 1 | Refills: 3 | Status: ACTIVE | COMMUNITY
Start: 2018-10-05 | End: 1900-01-01

## 2018-10-12 DIAGNOSIS — N32.89 OTHER SPECIFIED DISORDERS OF BLADDER: ICD-10-CM

## 2019-07-08 ENCOUNTER — APPOINTMENT (OUTPATIENT)
Dept: INTERNAL MEDICINE | Facility: CLINIC | Age: 84
End: 2019-07-08
Payer: MEDICARE

## 2019-07-08 VITALS
RESPIRATION RATE: 16 BRPM | WEIGHT: 124 LBS | DIASTOLIC BLOOD PRESSURE: 80 MMHG | BODY MASS INDEX: 21.97 KG/M2 | HEIGHT: 63 IN | HEART RATE: 76 BPM | SYSTOLIC BLOOD PRESSURE: 130 MMHG

## 2019-07-08 DIAGNOSIS — L29.9 PRURITUS, UNSPECIFIED: ICD-10-CM

## 2019-07-08 PROCEDURE — 99214 OFFICE O/P EST MOD 30 MIN: CPT

## 2019-07-08 NOTE — ASSESSMENT
[FreeTextEntry1] : Pt stable\par Pt will send recent labs here\par Can try hydrocortisone cream for itch but may be related to hair dyes or sprays  -not willing to give them up.\par Continue meds\par FU in [place].\par

## 2019-07-08 NOTE — HISTORY OF PRESENT ILLNESS
[FreeTextEntry1] : FU for hypertension, afib, hyperlipidemia, hypothyroidism\par Itchy scalp.\par Gets most of her care in [place] - including her physicals\par Coumadin managed by doctors in [place]\par \par

## 2019-07-08 NOTE — PHYSICAL EXAM
[No Acute Distress] : no acute distress [Well Nourished] : well nourished [Well Developed] : well developed [Well-Appearing] : well-appearing [Normal Sclera/Conjunctiva] : normal sclera/conjunctiva [Normal Oropharynx] : the oropharynx was normal [No JVD] : no jugular venous distention [No Lymphadenopathy] : no lymphadenopathy [Supple] : supple [No Respiratory Distress] : no respiratory distress  [Clear to Auscultation] : lungs were clear to auscultation bilaterally [No Accessory Muscle Use] : no accessory muscle use [Normal Rate] : normal rate  [Regular Rhythm] : with a regular rhythm [Normal S1, S2] : normal S1 and S2 [Soft] : abdomen soft [Non Tender] : non-tender [Non-distended] : non-distended [No HSM] : no HSM [No Rash] : no rash [de-identified] : trace edema on left [de-identified] : DIPESH [de-identified] : No distinct rash on scalp

## 2019-07-12 ENCOUNTER — RX RENEWAL (OUTPATIENT)
Age: 84
End: 2019-07-12

## 2019-07-15 ENCOUNTER — APPOINTMENT (OUTPATIENT)
Dept: CARDIOLOGY | Facility: CLINIC | Age: 84
End: 2019-07-15
Payer: MEDICARE

## 2019-07-15 ENCOUNTER — NON-APPOINTMENT (OUTPATIENT)
Age: 84
End: 2019-07-15

## 2019-07-15 VITALS
WEIGHT: 122 LBS | OXYGEN SATURATION: 93 % | HEIGHT: 63 IN | BODY MASS INDEX: 21.62 KG/M2 | HEART RATE: 84 BPM | DIASTOLIC BLOOD PRESSURE: 83 MMHG | SYSTOLIC BLOOD PRESSURE: 134 MMHG

## 2019-07-15 PROCEDURE — 93280 PM DEVICE PROGR EVAL DUAL: CPT

## 2019-07-15 PROCEDURE — 99215 OFFICE O/P EST HI 40 MIN: CPT

## 2019-07-15 PROCEDURE — 93000 ELECTROCARDIOGRAM COMPLETE: CPT | Mod: 59

## 2019-07-15 NOTE — REASON FOR VISIT
[Initial Evaluation] : an initial evaluation of [Atrial Fibrillation] : atrial fibrillation [Coronary Artery Disease] : coronary artery disease [Pacemaker Evaluation] : pacemaker ~T evaluation ~C was performed [FreeTextEntry1] : status post mitral valve repair, and (?) Tricuspid valve repair

## 2019-09-23 ENCOUNTER — APPOINTMENT (OUTPATIENT)
Dept: CARDIOLOGY | Facility: CLINIC | Age: 84
End: 2019-09-23
Payer: MEDICARE

## 2019-09-23 ENCOUNTER — NON-APPOINTMENT (OUTPATIENT)
Age: 84
End: 2019-09-23

## 2019-09-23 VITALS
DIASTOLIC BLOOD PRESSURE: 80 MMHG | OXYGEN SATURATION: 93 % | WEIGHT: 123 LBS | HEIGHT: 63 IN | BODY MASS INDEX: 21.79 KG/M2 | HEART RATE: 121 BPM | SYSTOLIC BLOOD PRESSURE: 115 MMHG

## 2019-09-23 DIAGNOSIS — Z79.899 OTHER LONG TERM (CURRENT) DRUG THERAPY: ICD-10-CM

## 2019-09-23 PROCEDURE — 93280 PM DEVICE PROGR EVAL DUAL: CPT

## 2019-09-23 PROCEDURE — 99215 OFFICE O/P EST HI 40 MIN: CPT

## 2019-09-23 PROCEDURE — 93000 ELECTROCARDIOGRAM COMPLETE: CPT | Mod: 59

## 2019-09-23 PROCEDURE — 93306 TTE W/DOPPLER COMPLETE: CPT

## 2019-09-23 RX ORDER — DIGOXIN 250 UG/1
250 TABLET ORAL
Refills: 0 | Status: DISCONTINUED | COMMUNITY
Start: 2017-06-14 | End: 2019-09-23

## 2019-09-23 NOTE — HISTORY OF PRESENT ILLNESS
[FreeTextEntry1] : She presents in scheduled followup reporting that she has been feeling well. "good summer.  Rcounts episode of blood in stool a few months ago, which, in fact occurred > 1yr ago. \par \par Edema is fairy well controlled with the use of compression stockings.  \par \par No c/o chest, throat,jaw, arm or upper back discomfort.  No dyspnea, orthopnea or PND.  No Palpitations, dizziness or syncope.  No claudication.\par \par No anticoagulation related bleeding problems.\par \par Submits a list of meds which includes dilt 120 mg qd and no longer lists digoxin 0.25 mg qd.

## 2019-09-23 NOTE — REASON FOR VISIT
[Initial Evaluation] : an initial evaluation of [Atrial Fibrillation] : atrial fibrillation [Pacemaker Evaluation] : pacemaker ~T evaluation ~C was performed [Coronary Artery Disease] : coronary artery disease [FreeTextEntry1] : status post mitral valve repair, and (?) Tricuspid valve repair

## 2019-09-25 LAB
ALBUMIN SERPL ELPH-MCNC: 4.4 G/DL
ALP BLD-CCNC: 76 U/L
ALT SERPL-CCNC: 20 U/L
ANION GAP SERPL CALC-SCNC: 18 MMOL/L
AST SERPL-CCNC: 19 U/L
BASOPHILS # BLD AUTO: 0.04 K/UL
BASOPHILS NFR BLD AUTO: 0.8 %
BILIRUB SERPL-MCNC: 0.8 MG/DL
BUN SERPL-MCNC: 32 MG/DL
CALCIUM SERPL-MCNC: 9.9 MG/DL
CHLORIDE SERPL-SCNC: 101 MMOL/L
CHOLEST SERPL-MCNC: 125 MG/DL
CHOLEST/HDLC SERPL: 1.8 RATIO
CO2 SERPL-SCNC: 25 MMOL/L
CREAT SERPL-MCNC: 1.06 MG/DL
DIGOXIN SERPL-MCNC: <0.3 NG/ML
EOSINOPHIL # BLD AUTO: 0.05 K/UL
EOSINOPHIL NFR BLD AUTO: 1 %
GLUCOSE SERPL-MCNC: 54 MG/DL
HCT VFR BLD CALC: 42.9 %
HDLC SERPL-MCNC: 71 MG/DL
HGB BLD-MCNC: 13.1 G/DL
IMM GRANULOCYTES NFR BLD AUTO: 0.2 %
LDLC SERPL CALC-MCNC: 34 MG/DL
LYMPHOCYTES # BLD AUTO: 0.94 K/UL
LYMPHOCYTES NFR BLD AUTO: 19.5 %
MAGNESIUM SERPL-MCNC: 2 MG/DL
MAN DIFF?: NORMAL
MCHC RBC-ENTMCNC: 30.3 PG
MCHC RBC-ENTMCNC: 30.5 GM/DL
MCV RBC AUTO: 99.1 FL
MONOCYTES # BLD AUTO: 0.48 K/UL
MONOCYTES NFR BLD AUTO: 10 %
NEUTROPHILS # BLD AUTO: 3.3 K/UL
NEUTROPHILS NFR BLD AUTO: 68.5 %
PLATELET # BLD AUTO: 144 K/UL
POTASSIUM SERPL-SCNC: 4.9 MMOL/L
PROT SERPL-MCNC: 6.6 G/DL
RBC # BLD: 4.33 M/UL
RBC # FLD: 14.5 %
SODIUM SERPL-SCNC: 144 MMOL/L
TRIGL SERPL-MCNC: 99 MG/DL
TSH SERPL-ACNC: 1.3 UIU/ML
WBC # FLD AUTO: 4.82 K/UL

## 2019-10-07 ENCOUNTER — NON-APPOINTMENT (OUTPATIENT)
Age: 84
End: 2019-10-07

## 2019-10-07 ENCOUNTER — APPOINTMENT (OUTPATIENT)
Dept: CARDIOLOGY | Facility: CLINIC | Age: 84
End: 2019-10-07
Payer: MEDICARE

## 2019-10-07 VITALS
HEIGHT: 63 IN | DIASTOLIC BLOOD PRESSURE: 78 MMHG | WEIGHT: 124 LBS | BODY MASS INDEX: 21.97 KG/M2 | SYSTOLIC BLOOD PRESSURE: 126 MMHG | HEART RATE: 78 BPM

## 2019-10-07 PROCEDURE — 99214 OFFICE O/P EST MOD 30 MIN: CPT

## 2019-10-07 PROCEDURE — 93000 ELECTROCARDIOGRAM COMPLETE: CPT

## 2019-10-07 NOTE — HISTORY OF PRESENT ILLNESS
[FreeTextEntry1] : She presents in scheduled followup reporting that she has been feeling well.  Rare palpitations at at bedtime.\par \par Edema (left greater than right) is fairy well controlled with the use of compression stockings.  \par \par No c/o chest, throat,jaw, arm or upper back discomfort.  No dyspnea, orthopnea or PND.  No Palpitations, dizziness or syncope.  No claudication.\par \par No anticoagulation related bleeding problems.\par \par Submits a list of meds which includes dilt 120 mg qd and no longer lists digoxin 0.25 mg qd.

## 2019-10-07 NOTE — REASON FOR VISIT
[Initial Evaluation] : an initial evaluation of [Coronary Artery Disease] : coronary artery disease [Atrial Fibrillation] : atrial fibrillation [Pacemaker Evaluation] : pacemaker ~T evaluation ~C was performed [FreeTextEntry1] : status post mitral valve repair, and (?) Tricuspid valve repair

## 2019-10-30 ENCOUNTER — APPOINTMENT (OUTPATIENT)
Dept: CARDIOLOGY | Facility: CLINIC | Age: 84
End: 2019-10-30

## 2020-08-05 ENCOUNTER — APPOINTMENT (OUTPATIENT)
Dept: CARDIOLOGY | Facility: CLINIC | Age: 85
End: 2020-08-05

## 2020-08-06 ENCOUNTER — NON-APPOINTMENT (OUTPATIENT)
Age: 85
End: 2020-08-06

## 2020-08-06 ENCOUNTER — APPOINTMENT (OUTPATIENT)
Dept: CARDIOLOGY | Facility: CLINIC | Age: 85
End: 2020-08-06
Payer: MEDICARE

## 2020-08-06 VITALS
OXYGEN SATURATION: 95 % | DIASTOLIC BLOOD PRESSURE: 80 MMHG | SYSTOLIC BLOOD PRESSURE: 126 MMHG | WEIGHT: 121 LBS | HEART RATE: 81 BPM | BODY MASS INDEX: 21.43 KG/M2

## 2020-08-06 PROCEDURE — 36415 COLL VENOUS BLD VENIPUNCTURE: CPT

## 2020-08-06 PROCEDURE — 99214 OFFICE O/P EST MOD 30 MIN: CPT

## 2020-08-06 PROCEDURE — 93280 PM DEVICE PROGR EVAL DUAL: CPT

## 2020-08-06 PROCEDURE — 93000 ELECTROCARDIOGRAM COMPLETE: CPT | Mod: 59

## 2020-08-06 NOTE — HISTORY OF PRESENT ILLNESS
[FreeTextEntry1] : She presents in scheduled followup reporting that she has been feeling well. \par \par Edema (left greater than right) is well controlled with the use of compression stockings.  \par \par No c/o chest, throat,jaw, arm or upper back discomfort.  No dyspnea, orthopnea or PND.  No Palpitations, dizziness or syncope.  No claudication.\par \par No anticoagulation related bleeding problems.\par \par In the fall, labs and pacemaker interrogation in [place] were unremarkable.

## 2020-08-07 LAB
ALBUMIN SERPL ELPH-MCNC: 4.7 G/DL
ALP BLD-CCNC: 64 U/L
ALT SERPL-CCNC: 21 U/L
ANION GAP SERPL CALC-SCNC: 15 MMOL/L
AST SERPL-CCNC: 20 U/L
BASOPHILS # BLD AUTO: 0.04 K/UL
BASOPHILS NFR BLD AUTO: 0.8 %
BILIRUB SERPL-MCNC: 0.7 MG/DL
BUN SERPL-MCNC: 25 MG/DL
CALCIUM SERPL-MCNC: 9.8 MG/DL
CHLORIDE SERPL-SCNC: 102 MMOL/L
CHOLEST SERPL-MCNC: 143 MG/DL
CHOLEST/HDLC SERPL: 1.7 RATIO
CO2 SERPL-SCNC: 24 MMOL/L
CREAT SERPL-MCNC: 0.89 MG/DL
EOSINOPHIL # BLD AUTO: 0.05 K/UL
EOSINOPHIL NFR BLD AUTO: 1 %
GLUCOSE SERPL-MCNC: 85 MG/DL
HCT VFR BLD CALC: 42.5 %
HDLC SERPL-MCNC: 83 MG/DL
HGB BLD-MCNC: 13.1 G/DL
IMM GRANULOCYTES NFR BLD AUTO: 0.2 %
LDLC SERPL CALC-MCNC: 46 MG/DL
LDLC SERPL DIRECT ASSAY-MCNC: 51 MG/DL
LYMPHOCYTES # BLD AUTO: 1.23 K/UL
LYMPHOCYTES NFR BLD AUTO: 24.6 %
MAN DIFF?: NORMAL
MCHC RBC-ENTMCNC: 30.8 GM/DL
MCHC RBC-ENTMCNC: 31 PG
MCV RBC AUTO: 100.7 FL
MONOCYTES # BLD AUTO: 0.7 K/UL
MONOCYTES NFR BLD AUTO: 14 %
NEUTROPHILS # BLD AUTO: 2.98 K/UL
NEUTROPHILS NFR BLD AUTO: 59.4 %
PLATELET # BLD AUTO: 159 K/UL
POTASSIUM SERPL-SCNC: 4.7 MMOL/L
PROT SERPL-MCNC: 6.7 G/DL
RBC # BLD: 4.22 M/UL
RBC # FLD: 14 %
SARS-COV-2 IGG SERPL IA-ACNC: 0.09 INDEX
SARS-COV-2 IGG SERPL QL IA: NEGATIVE
SODIUM SERPL-SCNC: 141 MMOL/L
TRIGL SERPL-MCNC: 68 MG/DL
TSH SERPL-ACNC: 0.94 UIU/ML
WBC # FLD AUTO: 5.01 K/UL

## 2020-08-11 ENCOUNTER — NON-APPOINTMENT (OUTPATIENT)
Age: 85
End: 2020-08-11

## 2020-08-11 ENCOUNTER — APPOINTMENT (OUTPATIENT)
Dept: INTERNAL MEDICINE | Facility: CLINIC | Age: 85
End: 2020-08-11
Payer: MEDICARE

## 2020-08-11 VITALS
BODY MASS INDEX: 22.32 KG/M2 | WEIGHT: 126 LBS | DIASTOLIC BLOOD PRESSURE: 80 MMHG | TEMPERATURE: 94.1 F | HEART RATE: 70 BPM | SYSTOLIC BLOOD PRESSURE: 120 MMHG | HEIGHT: 63 IN | RESPIRATION RATE: 16 BRPM

## 2020-08-11 VITALS — HEART RATE: 86 BPM | OXYGEN SATURATION: 96 %

## 2020-08-11 DIAGNOSIS — K92.1 MELENA: ICD-10-CM

## 2020-08-11 DIAGNOSIS — G47.00 INSOMNIA, UNSPECIFIED: ICD-10-CM

## 2020-08-11 PROCEDURE — G0439: CPT

## 2020-08-11 PROCEDURE — G0444 DEPRESSION SCREEN ANNUAL: CPT | Mod: 59

## 2020-08-11 PROCEDURE — 93000 ELECTROCARDIOGRAM COMPLETE: CPT | Mod: 59

## 2020-08-11 PROCEDURE — 99497 ADVNCD CARE PLAN 30 MIN: CPT | Mod: 33

## 2020-08-11 PROCEDURE — 99214 OFFICE O/P EST MOD 30 MIN: CPT | Mod: 25

## 2020-08-11 RX ORDER — PROPRANOLOL HYDROCHLORIDE 20 MG/1
20 TABLET ORAL
Qty: 90 | Refills: 3 | Status: DISCONTINUED | COMMUNITY
Start: 2017-06-14 | End: 2020-08-11

## 2020-08-11 NOTE — PHYSICAL EXAM
[No Acute Distress] : no acute distress [Well Nourished] : well nourished [Well Developed] : well developed [Well-Appearing] : well-appearing [Normal Sclera/Conjunctiva] : normal sclera/conjunctiva [PERRL] : pupils equal round and reactive to light [EOMI] : extraocular movements intact [Normal Outer Ear/Nose] : the outer ears and nose were normal in appearance [Normal Oropharynx] : the oropharynx was normal [Normal TMs] : both tympanic membranes were normal [No JVD] : no jugular venous distention [No Lymphadenopathy] : no lymphadenopathy [Supple] : supple [No Respiratory Distress] : no respiratory distress  [Thyroid Normal, No Nodules] : the thyroid was normal and there were no nodules present [No Accessory Muscle Use] : no accessory muscle use [Clear to Auscultation] : lungs were clear to auscultation bilaterally [Regular Rhythm] : with a regular rhythm [Normal Rate] : normal rate  [Normal S1, S2] : normal S1 and S2 [No Murmur] : no murmur heard [No Carotid Bruits] : no carotid bruits [No Abdominal Bruit] : a ~M bruit was not heard ~T in the abdomen [No Varicosities] : no varicosities [Pedal Pulses Present] : the pedal pulses are present [No Palpable Aorta] : no palpable aorta [No Extremity Clubbing/Cyanosis] : no extremity clubbing/cyanosis [Normal Appearance] : normal in appearance [No Nipple Discharge] : no nipple discharge [Soft] : abdomen soft [No Axillary Lymphadenopathy] : no axillary lymphadenopathy [Non Tender] : non-tender [No Masses] : no abdominal mass palpated [Non-distended] : non-distended [No HSM] : no HSM [Declined Rectal Exam] : declined rectal exam [Normal Bowel Sounds] : normal bowel sounds [Normal Posterior Cervical Nodes] : no posterior cervical lymphadenopathy [Normal Anterior Cervical Nodes] : no anterior cervical lymphadenopathy [No CVA Tenderness] : no CVA  tenderness [No Joint Swelling] : no joint swelling [No Spinal Tenderness] : no spinal tenderness [No Rash] : no rash [Grossly Normal Strength/Tone] : grossly normal strength/tone [Coordination Grossly Intact] : coordination grossly intact [Normal Gait] : normal gait [No Focal Deficits] : no focal deficits [Normal Affect] : the affect was normal [Normal Insight/Judgement] : insight and judgment were intact [Normal] : affect was normal and insight and judgment were intact [de-identified] : Declines [de-identified] : tr-1+ lymphedema at ankles [de-identified] : Ecchymoses

## 2020-08-11 NOTE — HISTORY OF PRESENT ILLNESS
[FreeTextEntry1] : Well visit and follow up for management of:\par Hypertension - on meds\par Hyperlipidemia - on meds\par Insomnia - on meds

## 2020-08-11 NOTE — REVIEW OF SYSTEMS
[Hearing Loss] : hearing loss [Negative] : Psychiatric [FreeTextEntry5] : see HPI [de-identified] : sees derm

## 2020-08-11 NOTE — ASSESSMENT
[FreeTextEntry1] : Pt with above medical issues which requires extra work in addition to the well visit.\par Labs reviewed - from Dr. Marshall\par Meds adjusted\par Health counseling.\par FU 6 months.\par

## 2020-08-11 NOTE — HEALTH RISK ASSESSMENT
[Good] : ~his/her~  mood as  good [Yes] : Yes [] : No [2 - 3 times a week (3 pts)] : 2 - 3  times a week (3 points) [Never (0 pts)] : Never (0 points) [1 or 2 (0 pts)] : 1 or 2 (0 points) [No falls in past year] : Patient reported no falls in the past year [de-identified] : Low salt and fat [de-identified] : Walking [Change in mental status noted] : No change in mental status noted [None] : None [Alone] : lives alone [Retired] : retired [Feels Safe at Home] : Feels safe at home [] :  [Fully functional (using the telephone, shopping, preparing meals, housekeeping, doing laundry, using] : Fully functional and needs no help or supervision to perform IADLs (using the telephone, shopping, preparing meals, housekeeping, doing laundry, using transportation, managing medications and managing finances) [Fully functional (bathing, dressing, toileting, transferring, walking, feeding)] : Fully functional (bathing, dressing, toileting, transferring, walking, feeding) [Smoke Detector] : smoke detector [Carbon Monoxide Detector] : carbon monoxide detector [Seat Belt] :  uses seat belt [Sunscreen] : uses sunscreen [Designated Healthcare Proxy] : Designated healthcare proxy [With Patient/Caregiver] : With Patient/Caregiver [Name: ___] : Health Care Proxy's Name: [unfilled]  [FreeTextEntry4] : Had long discussion with the patient.\par Discussed with pt the need for a health care proxy.\par Discussed who can be a proxy, forms given if needed, and the need for the proxy to know their wishes and to make sure they will comply.\par The proxy will need to have a copy in their home and the patient should have a copy.\par \par  [AdvancecareDate] : 08/20

## 2020-08-18 ENCOUNTER — NON-APPOINTMENT (OUTPATIENT)
Age: 85
End: 2020-08-18

## 2020-08-18 ENCOUNTER — APPOINTMENT (OUTPATIENT)
Dept: OPHTHALMOLOGY | Facility: CLINIC | Age: 85
End: 2020-08-18
Payer: MEDICARE

## 2020-08-18 PROCEDURE — 92015 DETERMINE REFRACTIVE STATE: CPT

## 2020-08-18 PROCEDURE — 92060 SENSORIMOTOR EXAMINATION: CPT

## 2020-08-18 PROCEDURE — 92012 INTRM OPH EXAM EST PATIENT: CPT

## 2020-09-02 ENCOUNTER — NON-APPOINTMENT (OUTPATIENT)
Age: 85
End: 2020-09-02

## 2020-09-08 ENCOUNTER — APPOINTMENT (OUTPATIENT)
Dept: INTERNAL MEDICINE | Facility: CLINIC | Age: 85
End: 2020-09-08
Payer: MEDICARE

## 2020-09-08 PROCEDURE — 90662 IIV NO PRSV INCREASED AG IM: CPT

## 2020-09-08 PROCEDURE — G0008: CPT

## 2020-09-25 ENCOUNTER — APPOINTMENT (OUTPATIENT)
Dept: INTERNAL MEDICINE | Facility: CLINIC | Age: 85
End: 2020-09-25
Payer: MEDICARE

## 2020-09-25 ENCOUNTER — MED ADMIN CHARGE (OUTPATIENT)
Age: 85
End: 2020-09-25

## 2020-09-25 PROCEDURE — 90732 PPSV23 VACC 2 YRS+ SUBQ/IM: CPT

## 2020-09-25 PROCEDURE — G0009: CPT

## 2020-11-16 ENCOUNTER — RX RENEWAL (OUTPATIENT)
Age: 85
End: 2020-11-16

## 2020-12-07 ENCOUNTER — APPOINTMENT (OUTPATIENT)
Dept: CARDIOLOGY | Facility: CLINIC | Age: 85
End: 2020-12-07
Payer: MEDICARE

## 2020-12-07 ENCOUNTER — NON-APPOINTMENT (OUTPATIENT)
Age: 85
End: 2020-12-07

## 2020-12-07 VITALS
HEART RATE: 83 BPM | SYSTOLIC BLOOD PRESSURE: 150 MMHG | WEIGHT: 125 LBS | OXYGEN SATURATION: 96 % | DIASTOLIC BLOOD PRESSURE: 86 MMHG | BODY MASS INDEX: 22.14 KG/M2 | TEMPERATURE: 97.9 F

## 2020-12-07 VITALS — DIASTOLIC BLOOD PRESSURE: 68 MMHG | SYSTOLIC BLOOD PRESSURE: 122 MMHG

## 2020-12-07 PROCEDURE — 93000 ELECTROCARDIOGRAM COMPLETE: CPT | Mod: 59

## 2020-12-07 PROCEDURE — 93280 PM DEVICE PROGR EVAL DUAL: CPT

## 2020-12-07 PROCEDURE — 99214 OFFICE O/P EST MOD 30 MIN: CPT

## 2020-12-07 RX ORDER — DILTIAZEM HYDROCHLORIDE 120 MG/1
120 CAPSULE, EXTENDED RELEASE ORAL
Qty: 180 | Refills: 0 | Status: DISCONTINUED | COMMUNITY
Start: 2019-09-23 | End: 2020-12-07

## 2020-12-07 RX ORDER — WARFARIN 2.5 MG/1
2.5 TABLET ORAL
Qty: 90 | Refills: 1 | Status: DISCONTINUED | COMMUNITY
Start: 2017-06-14 | End: 2020-12-07

## 2020-12-07 NOTE — HISTORY OF PRESENT ILLNESS
[FreeTextEntry1] : She presents in scheduled followup reporting that she has been feeling well.  Inactive during the pandemic.  Much stress related to her sister's progressive dementia.\par \par Edema (left greater than right) is well controlled with the use of compression stockings.  \par \par No c/o chest, throat,jaw, arm or upper back discomfort.  No dyspnea, orthopnea or PND.  No Palpitations, dizziness or syncope.  No claudication.\par \par No anticoagulation related bleeding problems other than easy bruising\par \par Dr. Gray around September.  He changed warfarin to Xarelto.  Laboratory studies were drawn and she believes that echocardiography was also performed.\par \par

## 2020-12-29 ENCOUNTER — NON-APPOINTMENT (OUTPATIENT)
Age: 85
End: 2020-12-29

## 2021-01-07 ENCOUNTER — APPOINTMENT (OUTPATIENT)
Dept: INTERNAL MEDICINE | Facility: CLINIC | Age: 86
End: 2021-01-07
Payer: MEDICARE

## 2021-01-07 VITALS — TEMPERATURE: 97.6 F

## 2021-01-07 VITALS — HEART RATE: 72 BPM | SYSTOLIC BLOOD PRESSURE: 130 MMHG | DIASTOLIC BLOOD PRESSURE: 70 MMHG | RESPIRATION RATE: 16 BRPM

## 2021-01-07 PROCEDURE — 99213 OFFICE O/P EST LOW 20 MIN: CPT

## 2021-01-07 NOTE — ASSESSMENT
[FreeTextEntry1] : Pt with right sided rib pain after fall\par Likely a bruise - can not ro fracture\par Could do Xrays but will not  and pt declines\par To use heat and Tylenol.

## 2021-01-07 NOTE — HISTORY OF PRESENT ILLNESS
[FreeTextEntry1] : Fell 1.5 weeks ago - slipped on carpet and fell against right side of hospital bed - No LOC\par Did not hit head\par No pleuritic pain\par Hurts to touch\par Slightly better\par Not SOB\par

## 2021-01-07 NOTE — PHYSICAL EXAM
[Normal] : no acute distress, well nourished, well developed and well-appearing [Normal Sclera/Conjunctiva] : normal sclera/conjunctiva [No Respiratory Distress] : no respiratory distress  [No Accessory Muscle Use] : no accessory muscle use [Clear to Auscultation] : lungs were clear to auscultation bilaterally [Normal Rate] : normal rate  [Regular Rhythm] : with a regular rhythm [Normal S1, S2] : normal S1 and S2 [Normal Appearance] : normal in appearance [No Nipple Discharge] : no nipple discharge [No Axillary Lymphadenopathy] : no axillary lymphadenopathy [Soft] : abdomen soft [Non-distended] : non-distended [No Masses] : no abdominal mass palpated [No HSM] : no HSM [de-identified] : Good breath sounds bilaterally [de-identified] : Mild rib pain on right - axillary line.

## 2021-02-08 ENCOUNTER — APPOINTMENT (OUTPATIENT)
Dept: OPHTHALMOLOGY | Facility: CLINIC | Age: 86
End: 2021-02-08
Payer: MEDICARE

## 2021-02-08 ENCOUNTER — NON-APPOINTMENT (OUTPATIENT)
Age: 86
End: 2021-02-08

## 2021-02-08 PROCEDURE — 92060 SENSORIMOTOR EXAMINATION: CPT

## 2021-02-08 PROCEDURE — 92012 INTRM OPH EXAM EST PATIENT: CPT

## 2021-02-26 ENCOUNTER — NON-APPOINTMENT (OUTPATIENT)
Age: 86
End: 2021-02-26

## 2021-03-01 ENCOUNTER — RX RENEWAL (OUTPATIENT)
Age: 86
End: 2021-03-01

## 2021-03-12 ENCOUNTER — RX RENEWAL (OUTPATIENT)
Age: 86
End: 2021-03-12

## 2021-04-13 ENCOUNTER — APPOINTMENT (OUTPATIENT)
Dept: INTERNAL MEDICINE | Facility: CLINIC | Age: 86
End: 2021-04-13
Payer: MEDICARE

## 2021-04-13 VITALS
HEART RATE: 72 BPM | SYSTOLIC BLOOD PRESSURE: 130 MMHG | TEMPERATURE: 97.1 F | DIASTOLIC BLOOD PRESSURE: 70 MMHG | BODY MASS INDEX: 20.91 KG/M2 | RESPIRATION RATE: 16 BRPM | HEIGHT: 63 IN | WEIGHT: 118 LBS

## 2021-04-13 DIAGNOSIS — R10.9 UNSPECIFIED ABDOMINAL PAIN: ICD-10-CM

## 2021-04-13 PROCEDURE — 99214 OFFICE O/P EST MOD 30 MIN: CPT | Mod: 25

## 2021-04-13 PROCEDURE — 36415 COLL VENOUS BLD VENIPUNCTURE: CPT

## 2021-04-13 NOTE — ASSESSMENT
[FreeTextEntry1] : Pt with persistent chest pain after fall but also has weight loss and poor appetite\par Bloods drawn in office.\par Will get CT scans of chest-abd-pelvis\par

## 2021-04-13 NOTE — HISTORY OF PRESENT ILLNESS
[FreeTextEntry1] : Still with some right sided chest pain after fall in January\par No pleuritic pain\par Hurts to lie on it\par Not SOB\par Weight down.\par Not eating well\par  and helps with her sister who is not well.\par \par

## 2021-04-13 NOTE — PHYSICAL EXAM
[Normal Sclera/Conjunctiva] : normal sclera/conjunctiva [No JVD] : no jugular venous distention [No Lymphadenopathy] : no lymphadenopathy [Supple] : supple [Normal] : no respiratory distress, lungs were clear to auscultation bilaterally and no accessory muscle use [Normal Rate] : normal rate  [Normal S1, S2] : normal S1 and S2 [No Edema] : there was no peripheral edema [Soft] : abdomen soft [No Masses] : no abdominal mass palpated [No HSM] : no HSM [Normal Supraclavicular Nodes] : no supraclavicular lymphadenopathy [Normal Posterior Cervical Nodes] : no posterior cervical lymphadenopathy [Normal Anterior Cervical Nodes] : no anterior cervical lymphadenopathy [de-identified] : mildly tender in abdomen.

## 2021-04-14 ENCOUNTER — NON-APPOINTMENT (OUTPATIENT)
Age: 86
End: 2021-04-14

## 2021-04-14 LAB
ALBUMIN SERPL ELPH-MCNC: 4.7 G/DL
ALP BLD-CCNC: 71 U/L
ALT SERPL-CCNC: 12 U/L
ANION GAP SERPL CALC-SCNC: 17 MMOL/L
AST SERPL-CCNC: 16 U/L
BASOPHILS # BLD AUTO: 0.04 K/UL
BASOPHILS NFR BLD AUTO: 0.7 %
BILIRUB SERPL-MCNC: 0.5 MG/DL
BUN SERPL-MCNC: 38 MG/DL
CALCIUM SERPL-MCNC: 10.2 MG/DL
CHLORIDE SERPL-SCNC: 98 MMOL/L
CO2 SERPL-SCNC: 26 MMOL/L
CREAT SERPL-MCNC: 1.04 MG/DL
EOSINOPHIL # BLD AUTO: 0.06 K/UL
EOSINOPHIL NFR BLD AUTO: 1 %
GLUCOSE SERPL-MCNC: 106 MG/DL
HCT VFR BLD CALC: 42.5 %
HGB BLD-MCNC: 13.9 G/DL
IMM GRANULOCYTES NFR BLD AUTO: 0.2 %
LYMPHOCYTES # BLD AUTO: 1.38 K/UL
LYMPHOCYTES NFR BLD AUTO: 22.7 %
MAN DIFF?: NORMAL
MCHC RBC-ENTMCNC: 32.3 PG
MCHC RBC-ENTMCNC: 32.7 GM/DL
MCV RBC AUTO: 98.6 FL
MONOCYTES # BLD AUTO: 0.69 K/UL
MONOCYTES NFR BLD AUTO: 11.4 %
NEUTROPHILS # BLD AUTO: 3.89 K/UL
NEUTROPHILS NFR BLD AUTO: 64 %
PLATELET # BLD AUTO: 162 K/UL
POTASSIUM SERPL-SCNC: 4 MMOL/L
PROT SERPL-MCNC: 7.1 G/DL
RBC # BLD: 4.31 M/UL
RBC # FLD: 13.9 %
SODIUM SERPL-SCNC: 141 MMOL/L
TSH SERPL-ACNC: 0.87 UIU/ML
WBC # FLD AUTO: 6.07 K/UL

## 2021-04-19 ENCOUNTER — OUTPATIENT (OUTPATIENT)
Dept: OUTPATIENT SERVICES | Facility: HOSPITAL | Age: 86
LOS: 1 days | End: 2021-04-19
Payer: MEDICARE

## 2021-04-19 ENCOUNTER — RESULT REVIEW (OUTPATIENT)
Age: 86
End: 2021-04-19

## 2021-04-19 ENCOUNTER — APPOINTMENT (OUTPATIENT)
Dept: CT IMAGING | Facility: IMAGING CENTER | Age: 86
End: 2021-04-19
Payer: MEDICARE

## 2021-04-19 DIAGNOSIS — R10.9 UNSPECIFIED ABDOMINAL PAIN: ICD-10-CM

## 2021-04-19 DIAGNOSIS — R63.4 ABNORMAL WEIGHT LOSS: ICD-10-CM

## 2021-04-19 DIAGNOSIS — Z95.2 PRESENCE OF PROSTHETIC HEART VALVE: Chronic | ICD-10-CM

## 2021-04-19 PROCEDURE — 74177 CT ABD & PELVIS W/CONTRAST: CPT | Mod: 26,ME

## 2021-04-19 PROCEDURE — 71260 CT THORAX DX C+: CPT

## 2021-04-19 PROCEDURE — G1004: CPT

## 2021-04-19 PROCEDURE — 74177 CT ABD & PELVIS W/CONTRAST: CPT

## 2021-04-19 PROCEDURE — 71260 CT THORAX DX C+: CPT | Mod: 26,ME

## 2021-04-20 ENCOUNTER — NON-APPOINTMENT (OUTPATIENT)
Age: 86
End: 2021-04-20

## 2021-04-30 ENCOUNTER — NON-APPOINTMENT (OUTPATIENT)
Age: 86
End: 2021-04-30

## 2021-05-10 ENCOUNTER — APPOINTMENT (OUTPATIENT)
Dept: ORTHOPEDIC SURGERY | Facility: CLINIC | Age: 86
End: 2021-05-10
Payer: MEDICARE

## 2021-05-10 VITALS
HEART RATE: 81 BPM | WEIGHT: 118 LBS | BODY MASS INDEX: 19.66 KG/M2 | DIASTOLIC BLOOD PRESSURE: 79 MMHG | HEIGHT: 65 IN | SYSTOLIC BLOOD PRESSURE: 127 MMHG | OXYGEN SATURATION: 98 %

## 2021-05-10 DIAGNOSIS — R07.81 PLEURODYNIA: ICD-10-CM

## 2021-05-10 PROCEDURE — 71100 X-RAY EXAM RIBS UNI 2 VIEWS: CPT | Mod: RT

## 2021-05-10 PROCEDURE — 99204 OFFICE O/P NEW MOD 45 MIN: CPT

## 2021-05-12 ENCOUNTER — APPOINTMENT (OUTPATIENT)
Dept: OPHTHALMOLOGY | Facility: CLINIC | Age: 86
End: 2021-05-12
Payer: MEDICARE

## 2021-05-12 ENCOUNTER — NON-APPOINTMENT (OUTPATIENT)
Age: 86
End: 2021-05-12

## 2021-05-12 PROCEDURE — 92012 INTRM OPH EXAM EST PATIENT: CPT

## 2021-05-16 PROBLEM — R07.81 RIB PAIN: Status: ACTIVE | Noted: 2021-01-07

## 2021-06-07 ENCOUNTER — APPOINTMENT (OUTPATIENT)
Dept: CARDIOLOGY | Facility: CLINIC | Age: 86
End: 2021-06-07
Payer: MEDICARE

## 2021-06-07 ENCOUNTER — NON-APPOINTMENT (OUTPATIENT)
Age: 86
End: 2021-06-07

## 2021-06-07 VITALS
TEMPERATURE: 98 F | OXYGEN SATURATION: 95 % | SYSTOLIC BLOOD PRESSURE: 128 MMHG | RESPIRATION RATE: 17 BRPM | WEIGHT: 122 LBS | HEART RATE: 75 BPM | HEIGHT: 65 IN | DIASTOLIC BLOOD PRESSURE: 67 MMHG | BODY MASS INDEX: 20.33 KG/M2

## 2021-06-07 PROCEDURE — 93280 PM DEVICE PROGR EVAL DUAL: CPT

## 2021-06-07 PROCEDURE — 99214 OFFICE O/P EST MOD 30 MIN: CPT

## 2021-06-07 PROCEDURE — 93000 ELECTROCARDIOGRAM COMPLETE: CPT | Mod: 59

## 2021-06-07 NOTE — HISTORY OF PRESENT ILLNESS
[FreeTextEntry1] : She presents in scheduled followup reporting that she has been feeling well. Low-level activities of daily living are well-tolerated.\par \par Edema (left greater than right) is well controlled with the use of compression stockings.  \par \par No c/o chest, throat,jaw, arm or upper back discomfort.  No dyspnea, orthopnea or PND.  No Palpitations, dizziness or syncope.  No claudication.\par \par No anticoagulation related bleeding problems other than easy bruising\par \par

## 2021-06-10 ENCOUNTER — NON-APPOINTMENT (OUTPATIENT)
Age: 86
End: 2021-06-10

## 2021-06-11 RX ORDER — METOPROLOL TARTRATE 50 MG/1
50 TABLET, FILM COATED ORAL TWICE DAILY
Qty: 180 | Refills: 3 | Status: ACTIVE | COMMUNITY
Start: 2017-06-14 | End: 1900-01-01

## 2021-06-21 ENCOUNTER — NON-APPOINTMENT (OUTPATIENT)
Age: 86
End: 2021-06-21

## 2021-06-22 ENCOUNTER — APPOINTMENT (OUTPATIENT)
Dept: INTERNAL MEDICINE | Facility: CLINIC | Age: 86
End: 2021-06-22
Payer: MEDICARE

## 2021-06-22 VITALS — RESPIRATION RATE: 16 BRPM | SYSTOLIC BLOOD PRESSURE: 130 MMHG | HEART RATE: 72 BPM | DIASTOLIC BLOOD PRESSURE: 80 MMHG

## 2021-06-22 DIAGNOSIS — I47.1 SUPRAVENTRICULAR TACHYCARDIA: ICD-10-CM

## 2021-06-22 DIAGNOSIS — M94.0 CHONDROCOSTAL JUNCTION SYNDROME [TIETZE]: ICD-10-CM

## 2021-06-22 PROCEDURE — 99213 OFFICE O/P EST LOW 20 MIN: CPT

## 2021-06-22 NOTE — ASSESSMENT
[FreeTextEntry1] : Pt with costochondritis - to use heat and no weights/carrying to left arm\par Can not use NSAID's\par

## 2021-06-22 NOTE — HISTORY OF PRESENT ILLNESS
[FreeTextEntry1] : Right sided chest to arm pain - some pain with moving arm.\par Sometimes hurts to take a deep breath\par Pain had gone away and now has returned.\par No palpitations

## 2021-06-22 NOTE — PHYSICAL EXAM
[Normal Sclera/Conjunctiva] : normal sclera/conjunctiva [No Edema] : there was no peripheral edema [Normal] : no posterior cervical lymphadenopathy and no anterior cervical lymphadenopathy [de-identified] : Very tender over left costochondral junctions

## 2021-06-29 ENCOUNTER — NON-APPOINTMENT (OUTPATIENT)
Age: 86
End: 2021-06-29

## 2021-07-08 ENCOUNTER — APPOINTMENT (OUTPATIENT)
Dept: OTOLARYNGOLOGY | Facility: CLINIC | Age: 86
End: 2021-07-08
Payer: MEDICARE

## 2021-07-08 VITALS
SYSTOLIC BLOOD PRESSURE: 125 MMHG | HEART RATE: 87 BPM | BODY MASS INDEX: 21 KG/M2 | DIASTOLIC BLOOD PRESSURE: 78 MMHG | HEIGHT: 64 IN | TEMPERATURE: 97.7 F | WEIGHT: 123 LBS

## 2021-07-08 DIAGNOSIS — J38.01 PARALYSIS OF VOCAL CORDS AND LARYNX, UNILATERAL: ICD-10-CM

## 2021-07-08 DIAGNOSIS — K21.9 GASTRO-ESOPHAGEAL REFLUX DISEASE W/OUT ESOPHAGITIS: ICD-10-CM

## 2021-07-08 DIAGNOSIS — R49.0 DYSPHONIA: ICD-10-CM

## 2021-07-08 DIAGNOSIS — J34.89 OTHER SPECIFIED DISORDERS OF NOSE AND NASAL SINUSES: ICD-10-CM

## 2021-07-08 PROCEDURE — 99204 OFFICE O/P NEW MOD 45 MIN: CPT | Mod: 25

## 2021-07-08 PROCEDURE — 31575 DIAGNOSTIC LARYNGOSCOPY: CPT

## 2021-07-08 RX ORDER — PANTOPRAZOLE 40 MG/1
40 TABLET, DELAYED RELEASE ORAL
Refills: 0 | Status: COMPLETED | COMMUNITY
Start: 2017-06-14 | End: 2021-07-08

## 2021-07-08 NOTE — END OF VISIT
[FreeTextEntry3] : I personally saw and examined DAJUAN CRANE in detail.  I spoke to YAZMIN Whitaker regarding the assessment and plan of care. I performed the procedures and relevant physical exam.  I have reviewed the above assessment and plan of care and I agree.  I have made changes to the body of the note wherever necessary and appropriate.

## 2021-07-08 NOTE — HISTORY OF PRESENT ILLNESS
[de-identified] : 89 y/o F with one year if raspy voice.  No pain, no SOB. No trouble eating or drinking.  No feeling of GERD.  Not on any reflux medication.   She notes many years ago was told had only one working vocal cord.  \par No hx of smoking. \par h/o open heart surgery in early 2000s \par She notes that previously she always suffered from some raspiness to the voice, but this past year has been severe

## 2021-07-08 NOTE — PROCEDURE
[de-identified] : Flexible scope #34 used. Passed through nasal passage (mid septum with < 1 cm perforation) and nasopharynx/oropharynx/hypopharynx clear. Supraglottis normal. Glottis with fully mobile left vocal cord, and paralysis of right vocal cord without lesions or masses, cord is shortened likely from longstanding atrophy. Visualized subglottis clear. Postcricoid area without erythema or edema. No pooling of secretions.\par \par

## 2021-07-08 NOTE — ASSESSMENT
[FreeTextEntry1] : Dysphagia with right vocal cord paralysis.  She notes was told only had one working vocal cord many years ago. \par Paralysis is of unknown etiology.  There are no mucosal lesions seen today. 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

## 2021-07-08 NOTE — CONSULT LETTER
[Dear  ___] : Dear  [unfilled], [Consult Letter:] : I had the pleasure of evaluating your patient, [unfilled]. [Please see my note below.] : Please see my note below. [Consult Closing:] : Thank you very much for allowing me to participate in the care of this patient.  If you have any questions, please do not hesitate to contact me. [Sincerely,] : Sincerely, [FreeTextEntry3] : Amalia Reyes M.D.\par Attending Physician,  \par Department of Otolaryngology - Head and Neck Surgery\par [facility] \par Office: [phone]\par Fax: [phone]\par

## 2021-08-02 ENCOUNTER — RX RENEWAL (OUTPATIENT)
Age: 86
End: 2021-08-02

## 2021-08-02 RX ORDER — FUROSEMIDE 40 MG/1
40 TABLET ORAL DAILY
Qty: 90 | Refills: 1 | Status: ACTIVE | COMMUNITY
Start: 2017-06-14 | End: 1900-01-01

## 2021-08-12 ENCOUNTER — RX RENEWAL (OUTPATIENT)
Age: 86
End: 2021-08-12

## 2021-08-12 RX ORDER — POTASSIUM CHLORIDE 1500 MG/1
20 TABLET, EXTENDED RELEASE ORAL DAILY
Qty: 90 | Refills: 2 | Status: ACTIVE | COMMUNITY
Start: 2017-06-14 | End: 1900-01-01

## 2021-08-23 DIAGNOSIS — R29.898 OTHER SYMPTOMS AND SIGNS INVOLVING THE MUSCULOSKELETAL SYSTEM: ICD-10-CM

## 2021-08-27 ENCOUNTER — NON-APPOINTMENT (OUTPATIENT)
Age: 86
End: 2021-08-27

## 2021-09-13 ENCOUNTER — NON-APPOINTMENT (OUTPATIENT)
Age: 86
End: 2021-09-13

## 2021-09-28 ENCOUNTER — APPOINTMENT (OUTPATIENT)
Dept: INTERNAL MEDICINE | Facility: CLINIC | Age: 86
End: 2021-09-28
Payer: MEDICARE

## 2021-09-28 VITALS
DIASTOLIC BLOOD PRESSURE: 80 MMHG | SYSTOLIC BLOOD PRESSURE: 135 MMHG | RESPIRATION RATE: 16 BRPM | HEART RATE: 72 BPM | WEIGHT: 120 LBS | BODY MASS INDEX: 20.49 KG/M2 | HEIGHT: 64 IN

## 2021-09-28 DIAGNOSIS — R73.09 OTHER ABNORMAL GLUCOSE: ICD-10-CM

## 2021-09-28 PROCEDURE — 90662 IIV NO PRSV INCREASED AG IM: CPT

## 2021-09-28 PROCEDURE — 99214 OFFICE O/P EST MOD 30 MIN: CPT | Mod: 25

## 2021-09-28 PROCEDURE — G0008: CPT

## 2021-09-28 PROCEDURE — 36415 COLL VENOUS BLD VENIPUNCTURE: CPT

## 2021-09-28 NOTE — PHYSICAL EXAM
[Normal Sclera/Conjunctiva] : normal sclera/conjunctiva [Normal Rate] : normal rate  [Regular Rhythm] : with a regular rhythm [Normal S1, S2] : normal S1 and S2 [No Edema] : there was no peripheral edema [Normal] : soft, non-tender, non-distended, no masses palpated, no HSM and normal bowel sounds [Normal Posterior Cervical Nodes] : no posterior cervical lymphadenopathy [Normal Anterior Cervical Nodes] : no anterior cervical lymphadenopathy [Alert and Oriented x3] : oriented to person, place, and time

## 2021-09-28 NOTE — ASSESSMENT
[FreeTextEntry1] : Pt with above medical issues.\par Bloods drawn in office.\par Meds to be adjusted.\par FLU shot given\par FU 3-4 months - CC

## 2021-09-29 LAB
ALBUMIN SERPL ELPH-MCNC: 4.8 G/DL
ALP BLD-CCNC: 75 U/L
ALT SERPL-CCNC: 12 U/L
ANION GAP SERPL CALC-SCNC: 15 MMOL/L
AST SERPL-CCNC: 17 U/L
BILIRUB SERPL-MCNC: 0.6 MG/DL
BUN SERPL-MCNC: 23 MG/DL
CALCIUM SERPL-MCNC: 9.9 MG/DL
CHLORIDE SERPL-SCNC: 99 MMOL/L
CHOLEST SERPL-MCNC: 172 MG/DL
CO2 SERPL-SCNC: 26 MMOL/L
CREAT SERPL-MCNC: 0.82 MG/DL
ESTIMATED AVERAGE GLUCOSE: 126 MG/DL
GLUCOSE SERPL-MCNC: 93 MG/DL
HBA1C MFR BLD HPLC: 6 %
HDLC SERPL-MCNC: 95 MG/DL
LDLC SERPL CALC-MCNC: 55 MG/DL
NONHDLC SERPL-MCNC: 77 MG/DL
POTASSIUM SERPL-SCNC: 4.1 MMOL/L
PROT SERPL-MCNC: 7.2 G/DL
SODIUM SERPL-SCNC: 140 MMOL/L
TRIGL SERPL-MCNC: 106 MG/DL
TSH SERPL-ACNC: 1.35 UIU/ML

## 2021-10-13 ENCOUNTER — NON-APPOINTMENT (OUTPATIENT)
Age: 86
End: 2021-10-13

## 2021-10-13 RX ORDER — AMOXICILLIN 500 MG/1
500 TABLET, FILM COATED ORAL
Qty: 4 | Refills: 6 | Status: ACTIVE | COMMUNITY
Start: 2021-10-13 | End: 1900-01-01

## 2021-11-06 ENCOUNTER — TRANSCRIPTION ENCOUNTER (OUTPATIENT)
Age: 86
End: 2021-11-06

## 2021-11-08 RX ORDER — OMEPRAZOLE 40 MG/1
40 CAPSULE, DELAYED RELEASE ORAL
Qty: 3 | Refills: 1 | Status: ACTIVE | COMMUNITY
Start: 2021-07-08 | End: 1900-01-01

## 2021-11-08 RX ORDER — DILTIAZEM HYDROCHLORIDE 120 MG/1
120 CAPSULE, EXTENDED RELEASE ORAL
Qty: 90 | Refills: 3 | Status: ACTIVE | COMMUNITY
Start: 2021-03-16 | End: 1900-01-01

## 2021-11-15 ENCOUNTER — NON-APPOINTMENT (OUTPATIENT)
Age: 86
End: 2021-11-15

## 2021-11-20 ENCOUNTER — EMERGENCY (EMERGENCY)
Facility: HOSPITAL | Age: 86
LOS: 1 days | Discharge: HOME | End: 2021-11-20
Attending: STUDENT IN AN ORGANIZED HEALTH CARE EDUCATION/TRAINING PROGRAM | Admitting: EMERGENCY MEDICINE
Payer: MEDICARE

## 2021-11-20 VITALS
DIASTOLIC BLOOD PRESSURE: 72 MMHG | HEART RATE: 79 BPM | SYSTOLIC BLOOD PRESSURE: 137 MMHG | TEMPERATURE: 98 F | OXYGEN SATURATION: 96 % | RESPIRATION RATE: 16 BRPM

## 2021-11-20 DIAGNOSIS — Z95.2 PRESENCE OF PROSTHETIC HEART VALVE: Chronic | ICD-10-CM

## 2021-11-20 LAB
ALBUMIN SERPL ELPH-MCNC: 3.9 G/DL — SIGNIFICANT CHANGE UP (ref 3.3–5)
ALP SERPL-CCNC: 68 U/L — SIGNIFICANT CHANGE UP (ref 40–120)
ALT FLD-CCNC: 8 U/L — SIGNIFICANT CHANGE UP (ref 4–33)
ANION GAP SERPL CALC-SCNC: 10 MMOL/L — SIGNIFICANT CHANGE UP (ref 7–14)
APTT BLD: 36.6 SEC — HIGH (ref 27–36.3)
AST SERPL-CCNC: 13 U/L — SIGNIFICANT CHANGE UP (ref 4–32)
BASOPHILS # BLD AUTO: 0.03 K/UL — SIGNIFICANT CHANGE UP (ref 0–0.2)
BASOPHILS # BLD AUTO: 0.03 K/UL — SIGNIFICANT CHANGE UP (ref 0–0.2)
BASOPHILS NFR BLD AUTO: 0.6 % — SIGNIFICANT CHANGE UP (ref 0–2)
BASOPHILS NFR BLD AUTO: 0.6 % — SIGNIFICANT CHANGE UP (ref 0–2)
BILIRUB SERPL-MCNC: 0.5 MG/DL — SIGNIFICANT CHANGE UP (ref 0.2–1.2)
BLD GP AB SCN SERPL QL: NEGATIVE — SIGNIFICANT CHANGE UP
BUN SERPL-MCNC: 27 MG/DL — HIGH (ref 7–23)
CALCIUM SERPL-MCNC: 9.9 MG/DL — SIGNIFICANT CHANGE UP (ref 8.4–10.5)
CHLORIDE SERPL-SCNC: 104 MMOL/L — SIGNIFICANT CHANGE UP (ref 98–107)
CO2 SERPL-SCNC: 25 MMOL/L — SIGNIFICANT CHANGE UP (ref 22–31)
CREAT SERPL-MCNC: 0.82 MG/DL — SIGNIFICANT CHANGE UP (ref 0.5–1.3)
EOSINOPHIL # BLD AUTO: 0.03 K/UL — SIGNIFICANT CHANGE UP (ref 0–0.5)
EOSINOPHIL # BLD AUTO: 0.03 K/UL — SIGNIFICANT CHANGE UP (ref 0–0.5)
EOSINOPHIL NFR BLD AUTO: 0.6 % — SIGNIFICANT CHANGE UP (ref 0–6)
EOSINOPHIL NFR BLD AUTO: 0.6 % — SIGNIFICANT CHANGE UP (ref 0–6)
GLUCOSE SERPL-MCNC: 105 MG/DL — HIGH (ref 70–99)
HCT VFR BLD CALC: 36.7 % — SIGNIFICANT CHANGE UP (ref 34.5–45)
HCT VFR BLD CALC: 38.3 % — SIGNIFICANT CHANGE UP (ref 34.5–45)
HGB BLD-MCNC: 11.6 G/DL — SIGNIFICANT CHANGE UP (ref 11.5–15.5)
HGB BLD-MCNC: 12 G/DL — SIGNIFICANT CHANGE UP (ref 11.5–15.5)
IANC: 3.19 K/UL — SIGNIFICANT CHANGE UP (ref 1.5–8.5)
IANC: 3.59 K/UL — SIGNIFICANT CHANGE UP (ref 1.5–8.5)
IMM GRANULOCYTES NFR BLD AUTO: 0.2 % — SIGNIFICANT CHANGE UP (ref 0–1.5)
IMM GRANULOCYTES NFR BLD AUTO: 0.2 % — SIGNIFICANT CHANGE UP (ref 0–1.5)
INR BLD: 1.46 RATIO — HIGH (ref 0.88–1.16)
LYMPHOCYTES # BLD AUTO: 0.67 K/UL — LOW (ref 1–3.3)
LYMPHOCYTES # BLD AUTO: 0.9 K/UL — LOW (ref 1–3.3)
LYMPHOCYTES # BLD AUTO: 13.6 % — SIGNIFICANT CHANGE UP (ref 13–44)
LYMPHOCYTES # BLD AUTO: 19 % — SIGNIFICANT CHANGE UP (ref 13–44)
MCHC RBC-ENTMCNC: 31 PG — SIGNIFICANT CHANGE UP (ref 27–34)
MCHC RBC-ENTMCNC: 31.1 PG — SIGNIFICANT CHANGE UP (ref 27–34)
MCHC RBC-ENTMCNC: 31.3 GM/DL — LOW (ref 32–36)
MCHC RBC-ENTMCNC: 31.6 GM/DL — LOW (ref 32–36)
MCV RBC AUTO: 98.4 FL — SIGNIFICANT CHANGE UP (ref 80–100)
MCV RBC AUTO: 99 FL — SIGNIFICANT CHANGE UP (ref 80–100)
MONOCYTES # BLD AUTO: 0.58 K/UL — SIGNIFICANT CHANGE UP (ref 0–0.9)
MONOCYTES # BLD AUTO: 0.6 K/UL — SIGNIFICANT CHANGE UP (ref 0–0.9)
MONOCYTES NFR BLD AUTO: 12.2 % — SIGNIFICANT CHANGE UP (ref 2–14)
MONOCYTES NFR BLD AUTO: 12.2 % — SIGNIFICANT CHANGE UP (ref 2–14)
NEUTROPHILS # BLD AUTO: 3.19 K/UL — SIGNIFICANT CHANGE UP (ref 1.8–7.4)
NEUTROPHILS # BLD AUTO: 3.59 K/UL — SIGNIFICANT CHANGE UP (ref 1.8–7.4)
NEUTROPHILS NFR BLD AUTO: 67.4 % — SIGNIFICANT CHANGE UP (ref 43–77)
NEUTROPHILS NFR BLD AUTO: 72.8 % — SIGNIFICANT CHANGE UP (ref 43–77)
NRBC # BLD: 0 /100 WBCS — SIGNIFICANT CHANGE UP
NRBC # BLD: 0 /100 WBCS — SIGNIFICANT CHANGE UP
NRBC # FLD: 0 K/UL — SIGNIFICANT CHANGE UP
NRBC # FLD: 0 K/UL — SIGNIFICANT CHANGE UP
PLATELET # BLD AUTO: 125 K/UL — LOW (ref 150–400)
PLATELET # BLD AUTO: 134 K/UL — LOW (ref 150–400)
POTASSIUM SERPL-MCNC: 4.4 MMOL/L — SIGNIFICANT CHANGE UP (ref 3.5–5.3)
POTASSIUM SERPL-SCNC: 4.4 MMOL/L — SIGNIFICANT CHANGE UP (ref 3.5–5.3)
PROT SERPL-MCNC: 6.5 G/DL — SIGNIFICANT CHANGE UP (ref 6–8.3)
PROTHROM AB SERPL-ACNC: 16.5 SEC — HIGH (ref 10.6–13.6)
RBC # BLD: 3.73 M/UL — LOW (ref 3.8–5.2)
RBC # BLD: 3.87 M/UL — SIGNIFICANT CHANGE UP (ref 3.8–5.2)
RBC # FLD: 14.4 % — SIGNIFICANT CHANGE UP (ref 10.3–14.5)
RBC # FLD: 14.5 % — SIGNIFICANT CHANGE UP (ref 10.3–14.5)
RH IG SCN BLD-IMP: NEGATIVE — SIGNIFICANT CHANGE UP
SARS-COV-2 RNA SPEC QL NAA+PROBE: SIGNIFICANT CHANGE UP
SODIUM SERPL-SCNC: 139 MMOL/L — SIGNIFICANT CHANGE UP (ref 135–145)
WBC # BLD: 4.74 K/UL — SIGNIFICANT CHANGE UP (ref 3.8–10.5)
WBC # BLD: 4.93 K/UL — SIGNIFICANT CHANGE UP (ref 3.8–10.5)
WBC # FLD AUTO: 4.74 K/UL — SIGNIFICANT CHANGE UP (ref 3.8–10.5)
WBC # FLD AUTO: 4.93 K/UL — SIGNIFICANT CHANGE UP (ref 3.8–10.5)

## 2021-11-20 PROCEDURE — 99218: CPT

## 2021-11-20 RX ORDER — LEVOTHYROXINE SODIUM 125 MCG
50 TABLET ORAL DAILY
Refills: 0 | Status: DISCONTINUED | OUTPATIENT
Start: 2021-11-20 | End: 2021-11-23

## 2021-11-20 RX ORDER — POLYETHYLENE GLYCOL 3350 17 G/17G
17 POWDER, FOR SOLUTION ORAL DAILY
Refills: 0 | Status: DISCONTINUED | OUTPATIENT
Start: 2021-11-20 | End: 2021-11-23

## 2021-11-20 RX ORDER — OXYMETAZOLINE HYDROCHLORIDE 0.5 MG/ML
4 SPRAY NASAL ONCE
Refills: 0 | Status: COMPLETED | OUTPATIENT
Start: 2021-11-20 | End: 2021-11-20

## 2021-11-20 RX ORDER — DILTIAZEM HCL 120 MG
120 CAPSULE, EXT RELEASE 24 HR ORAL DAILY
Refills: 0 | Status: DISCONTINUED | OUTPATIENT
Start: 2021-11-20 | End: 2021-11-20

## 2021-11-20 RX ORDER — POTASSIUM CHLORIDE 20 MEQ
20 PACKET (EA) ORAL DAILY
Refills: 0 | Status: DISCONTINUED | OUTPATIENT
Start: 2021-11-20 | End: 2021-11-23

## 2021-11-20 RX ORDER — METOPROLOL TARTRATE 50 MG
50 TABLET ORAL DAILY
Refills: 0 | Status: DISCONTINUED | OUTPATIENT
Start: 2021-11-20 | End: 2021-11-23

## 2021-11-20 RX ORDER — ATORVASTATIN CALCIUM 80 MG/1
20 TABLET, FILM COATED ORAL AT BEDTIME
Refills: 0 | Status: DISCONTINUED | OUTPATIENT
Start: 2021-11-20 | End: 2021-11-23

## 2021-11-20 RX ORDER — LIDOCAINE 4 G/100G
1 CREAM TOPICAL ONCE
Refills: 0 | Status: COMPLETED | OUTPATIENT
Start: 2021-11-20 | End: 2021-11-20

## 2021-11-20 RX ORDER — DILTIAZEM HCL 120 MG
120 CAPSULE, EXT RELEASE 24 HR ORAL DAILY
Refills: 0 | Status: DISCONTINUED | OUTPATIENT
Start: 2021-11-20 | End: 2021-11-23

## 2021-11-20 RX ORDER — FUROSEMIDE 40 MG
40 TABLET ORAL DAILY
Refills: 0 | Status: DISCONTINUED | OUTPATIENT
Start: 2021-11-20 | End: 2021-11-23

## 2021-11-20 RX ADMIN — Medication 2 MILLIGRAM(S): at 23:28

## 2021-11-20 RX ADMIN — ATORVASTATIN CALCIUM 20 MILLIGRAM(S): 80 TABLET, FILM COATED ORAL at 23:27

## 2021-11-20 RX ADMIN — OXYMETAZOLINE HYDROCHLORIDE 4 SPRAY(S): 0.5 SPRAY NASAL at 15:26

## 2021-11-20 RX ADMIN — LIDOCAINE 1 APPLICATION(S): 4 CREAM TOPICAL at 15:26

## 2021-11-20 NOTE — ED CDU PROVIDER INITIAL DAY NOTE - ENMT, MLM
Airway patent. Nasal mucosa clear. Mouth with normal mucosa. Throat has no vesicles, no oropharyngeal exudates and uvula is midline. No active bleeding. Oropharynx clear.

## 2021-11-20 NOTE — CONSULT NOTE ADULT - SUBJECTIVE AND OBJECTIVE BOX
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 onset 5am this morning, intermittent, endorses episode 2d prior. denies gi bleed, hematuria, syncope/presyncope, palpitations, AIKEN. no prior hx of anemia, transfusions, epistaxis. In ED, H/H stable at 11.6/36.7. No bleeding observed in ED.    Allergies    sulfa drugs (Unknown)    Intolerances        PAST MEDICAL & SURGICAL HISTORY:  No pertinent past medical history      Vital Signs Last 24 Hrs  T(C): 36.4 (20 Nov 2021 10:08), Max: 36.4 (20 Nov 2021 10:08)  T(F): 97.5 (20 Nov 2021 10:08), Max: 97.5 (20 Nov 2021 10:08)  HR: 82 (20 Nov 2021 11:06) (79 - 82)  BP: 138/80 (20 Nov 2021 11:06) (137/72 - 138/80)  BP(mean): --  RR: 18 (20 Nov 2021 11:06) (16 - 18)  SpO2: 97% (20 Nov 2021 11:06) (96% - 97%)    LABS:  CBC-                        11.6   4.93  )-----------( 125      ( 20 Nov 2021 11:13 )             36.7     11-20    139  |  104  |  27<H>  ----------------------------<  105<H>  4.4   |  25  |  0.82    Ca    9.9      20 Nov 2021 12:06    TPro  6.5  /  Alb  3.9  /  TBili  0.5  /  DBili  x   /  AST  13  /  ALT  8   /  AlkPhos  68  11-20    Coagulation Studies-  PT/INR - ( 20 Nov 2021 11:13 )   PT: 16.5 sec;   INR: 1.46 ratio         PTT - ( 20 Nov 2021 11:13 )  PTT:36.6 sec  Endocrine Panel-  --  --  9.9 mg/dL        PHYSICAL EXAM:    ENT EXAM-   Constitutional: Well-developed, well-nourished.  No hoarseness.     Head:  normocephalic, atraumatic.   Nose:  ***  OC/OP:  Floor of mouth, buccal mucosa, lips, hard palate, soft palate, uvula, posterior pharyngeal wall normal***  Neck:  Trachea midline.  Thyroid, parotid and submandibular glands normal.    MULTISYSTEM EXAM-  Neuro/Psych:  A&O x 3.  Mood stable.  Cranial nerves: 2-12 grossly intact bilaterally.  Eyes:  EOMI, no nystagmus.  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 onset 5am this morning, intermittent, endorses episode 2d prior. denies gi bleed, hematuria, syncope/presyncope, palpitations, AIKEN. no prior hx of anemia, transfusions, epistaxis. In ED, H/H stable at 11.6/36.7. No bleeding observed in ED.    Allergies    sulfa drugs (Unknown)    Intolerances        PAST MEDICAL & SURGICAL HISTORY:  No pertinent past medical history      Vital Signs Last 24 Hrs  T(C): 36.4 (20 Nov 2021 10:08), Max: 36.4 (20 Nov 2021 10:08)  T(F): 97.5 (20 Nov 2021 10:08), Max: 97.5 (20 Nov 2021 10:08)  HR: 82 (20 Nov 2021 11:06) (79 - 82)  BP: 138/80 (20 Nov 2021 11:06) (137/72 - 138/80)  BP(mean): --  RR: 18 (20 Nov 2021 11:06) (16 - 18)  SpO2: 97% (20 Nov 2021 11:06) (96% - 97%)    LABS:  CBC-                        11.6   4.93  )-----------( 125      ( 20 Nov 2021 11:13 )             36.7     11-20    139  |  104  |  27<H>  ----------------------------<  105<H>  4.4   |  25  |  0.82    Ca    9.9      20 Nov 2021 12:06    TPro  6.5  /  Alb  3.9  /  TBili  0.5  /  DBili  x   /  AST  13  /  ALT  8   /  AlkPhos  68  11-20    Coagulation Studies-  PT/INR - ( 20 Nov 2021 11:13 )   PT: 16.5 sec;   INR: 1.46 ratio         PTT - ( 20 Nov 2021 11:13 )  PTT:36.6 sec  Endocrine Panel-  --  --  9.9 mg/dL        PHYSICAL EXAM:    ENT EXAM-   Constitutional: Well-developed, well-nourished.  No hoarseness.     Head:  normocephalic, atraumatic.   Nose:  nasal septal perforation with obvious anterior clots bilaterally  OC/OP:  Floor of mouth, buccal mucosa, lips, hard palate, soft palate, uvula normal. Posterior pharyngeal wall with blood staining, no active dripping  Neck:  Trachea midline.  Thyroid, parotid and submandibular glands normal.    MULTISYSTEM EXAM-  Neuro/Psych:  A&O x 3.  Mood stable.  Cranial nerves: 2-12 grossly intact bilaterally.  Eyes:  EOMI, no nystagmus.  Pulm:  No dyspnea, non-labored breathing  Skin:  No rash or lesions on exposed skin of head/neck      PROCEDURE: Epistaxis  Clot removed from nasal cavity which revealed bleeding along edges of nasal septal perforation  Nasal cavity packed bilaterally with afrin/lidocaine soaked pledgets.  Pledgets were removed and silver nitrate was applied bilaterally to areas of bleeding along perforation  Surgicel was applied bilaterally.  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 onset 5am this morning, intermittent, endorses episode 2d prior. denies gi bleed, hematuria, syncope/presyncope, palpitations, AIKEN. no prior hx of anemia, transfusions, epistaxis. In ED, H/H stable at 11.6/36.7. No bleeding observed in ED. PSH significant for rhinoplasty at age 16 and septoplasty "much later."    Allergies    sulfa drugs (Unknown)    Intolerances        PAST MEDICAL & SURGICAL HISTORY:  No pertinent past medical history      Vital Signs Last 24 Hrs  T(C): 36.4 (20 Nov 2021 10:08), Max: 36.4 (20 Nov 2021 10:08)  T(F): 97.5 (20 Nov 2021 10:08), Max: 97.5 (20 Nov 2021 10:08)  HR: 82 (20 Nov 2021 11:06) (79 - 82)  BP: 138/80 (20 Nov 2021 11:06) (137/72 - 138/80)  BP(mean): --  RR: 18 (20 Nov 2021 11:06) (16 - 18)  SpO2: 97% (20 Nov 2021 11:06) (96% - 97%)    LABS:  CBC-                        11.6   4.93  )-----------( 125      ( 20 Nov 2021 11:13 )             36.7     11-20    139  |  104  |  27<H>  ----------------------------<  105<H>  4.4   |  25  |  0.82    Ca    9.9      20 Nov 2021 12:06    TPro  6.5  /  Alb  3.9  /  TBili  0.5  /  DBili  x   /  AST  13  /  ALT  8   /  AlkPhos  68  11-20    Coagulation Studies-  PT/INR - ( 20 Nov 2021 11:13 )   PT: 16.5 sec;   INR: 1.46 ratio         PTT - ( 20 Nov 2021 11:13 )  PTT:36.6 sec  Endocrine Panel-  --  --  9.9 mg/dL        PHYSICAL EXAM:    ENT EXAM-   Constitutional: Well-developed, well-nourished.  No hoarseness.     Head:  normocephalic, atraumatic.   Nose:  nasal septal perforation with obvious anterior clots bilaterally  OC/OP:  Floor of mouth, buccal mucosa, lips, hard palate, soft palate, uvula normal. Posterior pharyngeal wall with blood staining, no active dripping  Neck:  Trachea midline.  Thyroid, parotid and submandibular glands normal.    MULTISYSTEM EXAM-  Neuro/Psych:  A&O x 3.  Mood stable.  Cranial nerves: 2-12 grossly intact bilaterally.  Eyes:  EOMI, no nystagmus.  Pulm:  No dyspnea, non-labored breathing  Skin:  No rash or lesions on exposed skin of head/neck      PROCEDURE: Epistaxis  Clot removed from nasal cavity which revealed bleeding along edges of nasal septal perforation  Nasal cavity packed bilaterally with afrin/lidocaine soaked pledgets.  Pledgets were removed and silver nitrate was applied bilaterally to areas of bleeding along perforation  Surgicel was applied bilaterally.  Hemostasis confirmed after 1 hour

## 2021-11-20 NOTE — ED PROVIDER NOTE - ATTENDING CONTRIBUTION TO CARE
89 yo female with PMH CABG, on xarelto for evaluation of epistaxis, 3rd episode this week. patient reports 1st episode she applied ice and pressure and it resolved. Reports second episode she talked to her PCP who recommended evaluation of cauterization. Now reports she was awakened at 5am by the sensation of blood on her hands from nose, states that after awakening she continued to bleed despite pressure for 3 hours. Now bleeding has stopped but with dried blood in nose, states difficulty breathing through the congestion from the blood. Denies injuries or trauma. Denies chest pain, lightheadedness, palpitations, dizziness, shortness of breath, visual changes, sensorimotor deficits, skin pallor. PE: +dried blood noted L nare > R nare. A/P Labs, consult ENT

## 2021-11-20 NOTE — ED CDU PROVIDER INITIAL DAY NOTE - MEDICAL DECISION MAKING DETAILS
89 y/o female with pmhx of CABG, on Xarelto (unclear why), HTN, HLD, chronic constipation, presents to ED c/o epistaxis x 1 day. Intermittent. Had another episode 2 days ago. Pt denies hx of bleeding, cp, sob, palpitations, n/v, difficulty breathing. Pt had packing placed by ENT and removed it herself. Pt currently not actively bleeding, hemodynamically stable. sent to CDU for monitoring.

## 2021-11-20 NOTE — ED CDU PROVIDER DISPOSITION NOTE - CLINICAL COURSE
89 y/o female with pmhx of CABG, on Xarelto (unclear why), HTN, HLD, chronic constipation, presents to ED c/o epistaxis x 1 day. Intermittent. Had another episode 2 days ago. Pt denies hx of bleeding, cp, sob, palpitations, n/v, difficulty breathing. Pt had packing placed by ENT and removed it herself. Pt currently not actively bleeding, sent to CDU for monitoring. pt does not want to stay in CDU any longer, advised to stay as per ENT, will dispo home.

## 2021-11-20 NOTE — ED PROVIDER NOTE - PHYSICAL EXAMINATION
Gen: WDWN, NAD  HEENT: EOMI, no nasal discharge, mucous membranes moist, + dried blood in b/l nares, no active bleeding. + dried blood in oropharynx.   CV: RRR, 2+ radial pulses b/l  Resp: no accessory muscle use, no increased work of breathing  GI: Abdomen soft non-distended, NTTP  Neuro: A&Ox4, following commands, moving all four extremities spontaneously  Psych: appropriate mood

## 2021-11-20 NOTE — ED PROVIDER NOTE - CLINICAL SUMMARY MEDICAL DECISION MAKING FREE TEXT BOX
Kim Sidhu MD, PGY-2: 89YOF hx of CABG, xarelto for unclear etiology, p/w epistaxis. no signs of anemia. VS not tachycardic, normotensive. PE dried blood in b/l nares. low suspicion anemia given no symptoms and stable vs. plan for basic labs, 4h repeat cbc, monitor for bleed

## 2021-11-20 NOTE — CONSULT NOTE ADULT - ASSESSMENT
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     *** 89y Female hx of CABG, on xarelto for unclear etiology, p/w epistaxis. Found to have bleeding vessels on anterior nasal septal perforation, now controlled with silver nitrate and absorbable packing.    Admit to CDU for further observation  Would consider holding this evening's xarelto  Strict BP control  Afrin BID x3 days  Nasal saline sprays QID starting now  Please refer patient to our clinic for follow-up [phone]  Please page ENT if patient bleeds  D/C AM if no bleeding overnight

## 2021-11-20 NOTE — ED CDU PROVIDER INITIAL DAY NOTE - PROGRESS NOTE DETAILS
pt requesting to leave. does not want to stay in ED any longer. wants to go home. discussed with Dr. Rowland will dc home with strict return precautions

## 2021-11-20 NOTE — ED CDU PROVIDER INITIAL DAY NOTE - OBJECTIVE STATEMENT
89 y/o female with pmhx of CABG, on Xarelto (unclear why), HTN, HLD, chronic constipation, presents to ED c/o epistaxis x 1 day. Intermittent. Had another episode 2 days ago. Pt denies hx of bleeding, cp, sob, palpitations, n/v, difficulty breathing. Pt had packing placed by ENT and removed it herself. Pt currently not actively bleeding, sent to CDU for monitoring.

## 2021-11-20 NOTE — ED ADULT TRIAGE NOTE - CHIEF COMPLAINT QUOTE
c/o nosebleed since 0500. Intermittent bleeding noted at this time. Pt on Xarelto with CABG hx. States this is the third nosebleed this week.

## 2021-11-20 NOTE — ED PROVIDER NOTE - PROGRESS NOTE DETAILS
Tal, PGY3 - pt evaluated at bedside by ENT, pt w/ septal perforation, bleeding improved after removable packing placed. discussed w/ ENT resident who rec either obs vs admit for serial exams Kim Sidhu MD, PGY-2: pt remains clinically stable; pt without packing in nose, minimal bleeding; dispo pending ent Ophelia Short, PGY-1  Emergency Medicine  s/w CDU PA they will see pt.

## 2021-11-20 NOTE — ED PROVIDER NOTE - OBJECTIVE STATEMENT
89YOF hx of CABG, xarelto for unclear etiology, p/w epistaxis. onset 5am this morning, intermittent, endorses episode 2d prior. denies gi bleed, hematuria, syncope/presyncope, palpitations, AIKEN. no prior hx of anemia, transfusions, epistaxis.

## 2021-11-20 NOTE — ED PROVIDER NOTE - NS ED ROS FT
HEENT: + epistaxis  CV: Denies palpitations  Resp: Denies AIKEN  GI: Denies GI bleed  : Denies hematuria  Neuro: Denies LOC  all other ROS negative unless indicated in HPI

## 2021-11-21 ENCOUNTER — NON-APPOINTMENT (OUTPATIENT)
Age: 86
End: 2021-11-21

## 2021-11-21 VITALS
DIASTOLIC BLOOD PRESSURE: 82 MMHG | SYSTOLIC BLOOD PRESSURE: 142 MMHG | TEMPERATURE: 98 F | RESPIRATION RATE: 16 BRPM | OXYGEN SATURATION: 97 % | HEART RATE: 72 BPM

## 2021-11-21 LAB
HCT VFR BLD CALC: 35.1 % — SIGNIFICANT CHANGE UP (ref 34.5–45)
HGB BLD-MCNC: 11 G/DL — LOW (ref 11.5–15.5)
MCHC RBC-ENTMCNC: 30.9 PG — SIGNIFICANT CHANGE UP (ref 27–34)
MCHC RBC-ENTMCNC: 31.3 GM/DL — LOW (ref 32–36)
MCV RBC AUTO: 98.6 FL — SIGNIFICANT CHANGE UP (ref 80–100)
NRBC # BLD: 0 /100 WBCS — SIGNIFICANT CHANGE UP
NRBC # FLD: 0 K/UL — SIGNIFICANT CHANGE UP
PLATELET # BLD AUTO: 111 K/UL — LOW (ref 150–400)
RBC # BLD: 3.56 M/UL — LOW (ref 3.8–5.2)
RBC # FLD: 14.3 % — SIGNIFICANT CHANGE UP (ref 10.3–14.5)
WBC # BLD: 5.83 K/UL — SIGNIFICANT CHANGE UP (ref 3.8–10.5)
WBC # FLD AUTO: 5.83 K/UL — SIGNIFICANT CHANGE UP (ref 3.8–10.5)

## 2021-11-21 PROCEDURE — 99217: CPT

## 2021-11-21 RX ORDER — SODIUM CHLORIDE 0.65 %
1 AEROSOL, SPRAY (ML) NASAL ONCE
Refills: 0 | Status: COMPLETED | OUTPATIENT
Start: 2021-11-21 | End: 2021-11-21

## 2021-11-21 RX ADMIN — Medication 1 SPRAY(S): at 10:16

## 2021-11-21 NOTE — ED CDU PROVIDER DISPOSITION NOTE - CLINICAL COURSE
this is a 89 y.o female with a PMhx of CABG on xarelto this is a 89 y.o female with a PMhx of CABG on xarelto, HTN, HLD presented to the ED for epistaxis for 1 day. she had one earlier as well, however resolved. She was seen in the ED for epistaxis where she was evaluated by ENT and bleeding was controlled by silver nitrate and absorbable packing. Patient did not have any bleeding over night, denies having any dizziness, lightheadedness, nausea, vomiting, diarrhea, constipation, SOB, AIKEN.

## 2021-11-21 NOTE — ED CDU PROVIDER SUBSEQUENT DAY NOTE - PHYSICAL EXAMINATION
Caitlin ALEJANDRA:    VS noted  Gen. no acute distress, Non toxic   HEENT: EOMI, mmm, nasal packing in place with no active bleeding, pharynx normal, airway clear  Lungs: CTAB/L no C/ W /R   CVS: RRR   Abd; Soft non tender, non distended   Ext: no edema  Skin: no rash  Neuro AAOx3 non focal clear speech

## 2021-11-21 NOTE — ED CDU PROVIDER SUBSEQUENT DAY NOTE - HISTORY
Per PA note: 89 y/o female with pmhx of CABG, on Xarelto (unclear why), HTN, HLD, chronic constipation, presents to ED c/o epistaxis x 1 day. Intermittent. Had another episode 2 days ago. Pt denies hx of bleeding, cp, sob, palpitations, n/v, difficulty breathing. Pt had packing placed by ENT and removed it herself. Pt currently not actively bleeding, sent to CDU for monitoring.

## 2021-11-21 NOTE — ED CDU PROVIDER DISPOSITION NOTE - NSFOLLOWUPINSTRUCTIONS_ED_ALL_ED_FT
Follow up with your primary care provider within 48 hours  Follow up with ENT Dr. Solano    Use Afrin nasal spray if starts to bleed again    Return to ER for any new symptoms or any other concerns
Rest, drink plenty of fluids.  Advance activity as tolerated.  Continue all previously prescribed medications as directed.  Follow up with your primary care physician in 48-72 hours- bring copies of your results.  Return to the ER for worsening or persistent symptoms, and/or ANY NEW OR CONCERNING SYMPTOMS. If you have issues obtaining follow up, please call: [phone] to obtain a doctor or specialist who takes your insurance in your area.  You may call [phone] to make an appointment with the internal medicine clinic.    Please follow up in the ENT clinic, please call [phone] on monday.    Please do not take the Xarelto tonight 11/21/21, please contact your Primary care doctor to start the Xarelto again, CALL  YOUR PRIMARY CARE DOCTOR TOMORROW.

## 2021-11-21 NOTE — ED CDU PROVIDER DISPOSITION NOTE - INCLUDE COVID-19 DISCHARGE INSTRUCTIONS
<-------- Click here to INCLUDE CoVID-19 Discharge Instructions
<-------- Click here to INCLUDE CoVID-19 Discharge Instructions

## 2021-11-21 NOTE — ED CDU PROVIDER DISPOSITION NOTE - ATTENDING CONTRIBUTION TO CARE
Patient is a 89 yo F with history of CABG on xarelto here for epistaxis s/p silver nitrate and packing. Seen by ENT. Hgb stable 12->11. She was in CDU for observation, xarelto was held. No active bleeding. No dizziness. VSS. She feels better. Patient is anxious to go home. Follow up in 3 days.     VS noted  Gen. no acute distress, Non toxic   HEENT: EOMI, mmm, nasal packing in place with no active bleeding, pharynx normal, airway clear  Lungs: CTAB/L no C/ W /R   CVS: RRR   Abd; Soft non tender, non distended   Ext: no edema  Skin: no rash  Neuro AAOx3 non focal clear speech  a/p: epistaxis - s/p treatment, with absorbable packing. Seen by ENT. Patient has remained stable without active bleeding. No symptoms of dizziness, palpitations. Advised to hold xarelto today, call her doctor in the AM to inform and discuss when to continue xarelto. Patient feels comfortable with plan.

## 2021-11-21 NOTE — ED CDU PROVIDER DISPOSITION NOTE - PATIENT PORTAL LINK FT
You can access the FollowMyHealth Patient Portal offered by [facility] by registering at the following website: [email or web address]. By joining [employer]’s FollowMyHealth portal, you will also be able to view your health information using other applications (apps) compatible with our system.
You can access the FollowMyHealth Patient Portal offered by [facility] by registering at the following website: [email or web address]. By joining [employer]’s FollowMyHealth portal, you will also be able to view your health information using other applications (apps) compatible with our system.

## 2021-11-21 NOTE — ED CDU PROVIDER SUBSEQUENT DAY NOTE - ATTENDING CONTRIBUTION TO CARE
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 Seen by ENT. Hgb stable 12->11. She was in CDU for observation, xarelto was held. No active bleeding. No dizziness. VSS. She feels better. Patient is anxious to go home. Follow up in 3 days.     VS noted  Gen. no acute distress, Non toxic   HEENT: EOMI, mmm, nasal packing in place with no active bleeding, pharynx normal, airway clear  Lungs: CTAB/L no C/ W /R   CVS: RRR   Abd; Soft non tender, non distended   Ext: no edema  Skin: no rash  Neuro AAOx3 non focal clear speech  a/p: epistaxis - s/p treatment, with absorbable packing. Seen by ENT. Patient has remained stable without active bleeding. No symptoms of dizziness, palpitations. Advised to hold xarelto today, call her doctor in the AM to inform and discuss when to continue xarelto. Patient feels comfortable with plan.   - Caitlin ALEJANDRA

## 2021-11-26 ENCOUNTER — NON-APPOINTMENT (OUTPATIENT)
Age: 86
End: 2021-11-26

## 2021-11-30 ENCOUNTER — EMERGENCY (EMERGENCY)
Facility: HOSPITAL | Age: 86
LOS: 1 days | Discharge: HOME | End: 2021-11-30
Attending: EMERGENCY MEDICINE | Admitting: STUDENT IN AN ORGANIZED HEALTH CARE EDUCATION/TRAINING PROGRAM
Payer: MEDICARE

## 2021-11-30 VITALS
TEMPERATURE: 98 F | SYSTOLIC BLOOD PRESSURE: 123 MMHG | DIASTOLIC BLOOD PRESSURE: 66 MMHG | RESPIRATION RATE: 16 BRPM | OXYGEN SATURATION: 100 % | HEART RATE: 74 BPM

## 2021-11-30 DIAGNOSIS — R04.0 EPISTAXIS: ICD-10-CM

## 2021-11-30 LAB
ALBUMIN SERPL ELPH-MCNC: 4 G/DL — SIGNIFICANT CHANGE UP (ref 3.3–5)
ALP SERPL-CCNC: 67 U/L — SIGNIFICANT CHANGE UP (ref 40–120)
ALT FLD-CCNC: 12 U/L — SIGNIFICANT CHANGE UP (ref 4–33)
ANION GAP SERPL CALC-SCNC: 9 MMOL/L — SIGNIFICANT CHANGE UP (ref 7–14)
AST SERPL-CCNC: 13 U/L — SIGNIFICANT CHANGE UP (ref 4–32)
B PERT DNA SPEC QL NAA+PROBE: SIGNIFICANT CHANGE UP
B PERT+PARAPERT DNA PNL SPEC NAA+PROBE: SIGNIFICANT CHANGE UP
BASOPHILS # BLD AUTO: 0.02 K/UL — SIGNIFICANT CHANGE UP (ref 0–0.2)
BASOPHILS NFR BLD AUTO: 0.3 % — SIGNIFICANT CHANGE UP (ref 0–2)
BILIRUB SERPL-MCNC: 0.5 MG/DL — SIGNIFICANT CHANGE UP (ref 0.2–1.2)
BORDETELLA PARAPERTUSSIS (RAPRVP): SIGNIFICANT CHANGE UP
BUN SERPL-MCNC: 24 MG/DL — HIGH (ref 7–23)
C PNEUM DNA SPEC QL NAA+PROBE: SIGNIFICANT CHANGE UP
CALCIUM SERPL-MCNC: 9.6 MG/DL — SIGNIFICANT CHANGE UP (ref 8.4–10.5)
CHLORIDE SERPL-SCNC: 105 MMOL/L — SIGNIFICANT CHANGE UP (ref 98–107)
CO2 SERPL-SCNC: 26 MMOL/L — SIGNIFICANT CHANGE UP (ref 22–31)
CREAT SERPL-MCNC: 0.67 MG/DL — SIGNIFICANT CHANGE UP (ref 0.5–1.3)
EOSINOPHIL # BLD AUTO: 0.02 K/UL — SIGNIFICANT CHANGE UP (ref 0–0.5)
EOSINOPHIL NFR BLD AUTO: 0.3 % — SIGNIFICANT CHANGE UP (ref 0–6)
FLUAV SUBTYP SPEC NAA+PROBE: SIGNIFICANT CHANGE UP
FLUBV RNA SPEC QL NAA+PROBE: SIGNIFICANT CHANGE UP
GLUCOSE SERPL-MCNC: 139 MG/DL — HIGH (ref 70–99)
HADV DNA SPEC QL NAA+PROBE: SIGNIFICANT CHANGE UP
HCOV 229E RNA SPEC QL NAA+PROBE: SIGNIFICANT CHANGE UP
HCOV HKU1 RNA SPEC QL NAA+PROBE: SIGNIFICANT CHANGE UP
HCOV NL63 RNA SPEC QL NAA+PROBE: SIGNIFICANT CHANGE UP
HCOV OC43 RNA SPEC QL NAA+PROBE: SIGNIFICANT CHANGE UP
HCT VFR BLD CALC: 36.7 % — SIGNIFICANT CHANGE UP (ref 34.5–45)
HGB BLD-MCNC: 11.1 G/DL — LOW (ref 11.5–15.5)
HMPV RNA SPEC QL NAA+PROBE: SIGNIFICANT CHANGE UP
HPIV1 RNA SPEC QL NAA+PROBE: SIGNIFICANT CHANGE UP
HPIV2 RNA SPEC QL NAA+PROBE: SIGNIFICANT CHANGE UP
HPIV3 RNA SPEC QL NAA+PROBE: SIGNIFICANT CHANGE UP
HPIV4 RNA SPEC QL NAA+PROBE: SIGNIFICANT CHANGE UP
IANC: 4.54 K/UL — SIGNIFICANT CHANGE UP (ref 1.5–8.5)
IMM GRANULOCYTES NFR BLD AUTO: 0.2 % — SIGNIFICANT CHANGE UP (ref 0–1.5)
LYMPHOCYTES # BLD AUTO: 0.71 K/UL — LOW (ref 1–3.3)
LYMPHOCYTES # BLD AUTO: 12.2 % — LOW (ref 13–44)
M PNEUMO DNA SPEC QL NAA+PROBE: SIGNIFICANT CHANGE UP
MCHC RBC-ENTMCNC: 30.2 GM/DL — LOW (ref 32–36)
MCHC RBC-ENTMCNC: 30.6 PG — SIGNIFICANT CHANGE UP (ref 27–34)
MCV RBC AUTO: 101.1 FL — HIGH (ref 80–100)
MONOCYTES # BLD AUTO: 0.5 K/UL — SIGNIFICANT CHANGE UP (ref 0–0.9)
MONOCYTES NFR BLD AUTO: 8.6 % — SIGNIFICANT CHANGE UP (ref 2–14)
NEUTROPHILS # BLD AUTO: 4.54 K/UL — SIGNIFICANT CHANGE UP (ref 1.8–7.4)
NEUTROPHILS NFR BLD AUTO: 78.4 % — HIGH (ref 43–77)
NRBC # BLD: 0 /100 WBCS — SIGNIFICANT CHANGE UP
NRBC # FLD: 0 K/UL — SIGNIFICANT CHANGE UP
PLATELET # BLD AUTO: 162 K/UL — SIGNIFICANT CHANGE UP (ref 150–400)
POTASSIUM SERPL-MCNC: 4 MMOL/L — SIGNIFICANT CHANGE UP (ref 3.5–5.3)
POTASSIUM SERPL-SCNC: 4 MMOL/L — SIGNIFICANT CHANGE UP (ref 3.5–5.3)
PROT SERPL-MCNC: 6.7 G/DL — SIGNIFICANT CHANGE UP (ref 6–8.3)
RAPID RVP RESULT: SIGNIFICANT CHANGE UP
RBC # BLD: 3.63 M/UL — LOW (ref 3.8–5.2)
RBC # FLD: 13.8 % — SIGNIFICANT CHANGE UP (ref 10.3–14.5)
RSV RNA SPEC QL NAA+PROBE: SIGNIFICANT CHANGE UP
RV+EV RNA SPEC QL NAA+PROBE: SIGNIFICANT CHANGE UP
SARS-COV-2 RNA SPEC QL NAA+PROBE: SIGNIFICANT CHANGE UP
SODIUM SERPL-SCNC: 140 MMOL/L — SIGNIFICANT CHANGE UP (ref 135–145)
WBC # BLD: 5.8 K/UL — SIGNIFICANT CHANGE UP (ref 3.8–10.5)
WBC # FLD AUTO: 5.8 K/UL — SIGNIFICANT CHANGE UP (ref 3.8–10.5)

## 2021-11-30 PROCEDURE — 99220: CPT

## 2021-11-30 RX ORDER — RIVAROXABAN 15 MG-20MG
15 KIT ORAL DAILY
Refills: 0 | Status: DISCONTINUED | OUTPATIENT
Start: 2021-12-01 | End: 2021-12-03

## 2021-11-30 RX ORDER — ACETAMINOPHEN 500 MG
325 TABLET ORAL ONCE
Refills: 0 | Status: COMPLETED | OUTPATIENT
Start: 2021-11-30 | End: 2021-11-30

## 2021-11-30 RX ORDER — FUROSEMIDE 40 MG
40 TABLET ORAL DAILY
Refills: 0 | Status: DISCONTINUED | OUTPATIENT
Start: 2021-11-30 | End: 2021-12-03

## 2021-11-30 RX ORDER — METOPROLOL TARTRATE 50 MG
50 TABLET ORAL
Refills: 0 | Status: DISCONTINUED | OUTPATIENT
Start: 2021-11-30 | End: 2021-12-03

## 2021-11-30 RX ORDER — OXYBUTYNIN CHLORIDE 5 MG
5 TABLET ORAL
Refills: 0 | Status: DISCONTINUED | OUTPATIENT
Start: 2021-11-30 | End: 2021-12-03

## 2021-11-30 RX ORDER — ACETAMINOPHEN 500 MG
650 TABLET ORAL ONCE
Refills: 0 | Status: DISCONTINUED | OUTPATIENT
Start: 2021-11-30 | End: 2021-11-30

## 2021-11-30 RX ORDER — RIVAROXABAN 15 MG-20MG
20 KIT ORAL
Refills: 0 | Status: DISCONTINUED | OUTPATIENT
Start: 2021-11-30 | End: 2021-11-30

## 2021-11-30 RX ORDER — DILTIAZEM HCL 120 MG
120 CAPSULE, EXT RELEASE 24 HR ORAL DAILY
Refills: 0 | Status: DISCONTINUED | OUTPATIENT
Start: 2021-11-30 | End: 2021-12-03

## 2021-11-30 RX ORDER — ACETAMINOPHEN 500 MG
650 TABLET ORAL ONCE
Refills: 0 | Status: COMPLETED | OUTPATIENT
Start: 2021-11-30 | End: 2021-11-30

## 2021-11-30 RX ORDER — ATORVASTATIN CALCIUM 80 MG/1
20 TABLET, FILM COATED ORAL AT BEDTIME
Refills: 0 | Status: DISCONTINUED | OUTPATIENT
Start: 2021-11-30 | End: 2021-12-03

## 2021-11-30 RX ORDER — LEVOTHYROXINE SODIUM 125 MCG
50 TABLET ORAL DAILY
Refills: 0 | Status: DISCONTINUED | OUTPATIENT
Start: 2021-11-30 | End: 2021-12-03

## 2021-11-30 RX ADMIN — Medication 50 MILLIGRAM(S): at 18:35

## 2021-11-30 RX ADMIN — Medication 1 APPLICATION(S): at 10:01

## 2021-11-30 RX ADMIN — Medication 5 MILLIGRAM(S): at 18:36

## 2021-11-30 RX ADMIN — Medication 325 MILLIGRAM(S): at 23:03

## 2021-11-30 RX ADMIN — ATORVASTATIN CALCIUM 20 MILLIGRAM(S): 80 TABLET, FILM COATED ORAL at 23:03

## 2021-11-30 RX ADMIN — Medication 650 MILLIGRAM(S): at 19:12

## 2021-11-30 RX ADMIN — Medication 2 MILLIGRAM(S): at 23:12

## 2021-11-30 RX ADMIN — Medication 650 MILLIGRAM(S): at 18:42

## 2021-11-30 NOTE — ED CDU PROVIDER INITIAL DAY NOTE - MEDICAL DECISION MAKING DETAILS
89 y/o female pmh htn, hld, afib on xarelto, CABG c/o epistaxis- currently controlled w/ rhino rocket and surgicel, will hold xarelto, monitor in CDU, ENT f/u.

## 2021-11-30 NOTE — ED PROVIDER NOTE - CLINICAL SUMMARY MEDICAL DECISION MAKING FREE TEXT BOX
89yo female p/w epistaxis from L nares, hemodynamically stable. Given afrin and lido/epi soaked gauze, will monitor, packing/cauterize if needed.

## 2021-11-30 NOTE — ED ADULT TRIAGE NOTE - CHIEF COMPLAINT QUOTE
pt c/o epistaxis since 6 am. Pt states she had similar last weak and had it cauterized. pt is on eliquis. Bleeding controlled with pressure and 4x4. No complaints of chest pain, headache, nausea, dizziness, vomiting  SOB, fever, chills verbalized.

## 2021-11-30 NOTE — ED PROVIDER NOTE - PHYSICAL EXAMINATION
Physical Exam:  Gen: NAD, AOx3, non-toxic appearing  Head: NCAT  HEENT: mild blood for L nares, EOMI, PEERLA, normal conjunctiva, tongue midline, oral mucosa moist  Lung: CTAB, no respiratory distress, no wheezes/rhonchi/rales B/L, speaking in full sentences  CV: RRR, no murmurs, rubs or gallops, distal pulses 2+ b/l  Abd: soft, NT, ND, no guarding, no rigidity, no rebound tenderness, no CVA tenderness   Skin: Warm, well perfused, no rash, no leg swelling  Psych: normal affect, calm

## 2021-11-30 NOTE — ED CDU PROVIDER INITIAL DAY NOTE - OBJECTIVE STATEMENT
89 y/o female pmh htn, hld, afib on xarelto, CABG, c/o epistaxis. Pt was seen and eval by ENT and a rhino rocket was placed in the L nare and surgicel was placed in the L. Pt placed in CDU for monitoring and possible bleeding control. 89 y/o female pmh htn, hld, afib on xarelto, CABG, c/o epistaxis. Pt was seen and eval by ENT and a rhino rocket was placed in the L nare and surgicel was placed in the L. Pt placed in CDU for monitoring and possible bleeding control. Has no systemic symptoms and feels otherwise well.

## 2021-11-30 NOTE — ED ADULT NURSE NOTE - OBJECTIVE STATEMENT
89y/o female A&ox4, ambulatory received in rm5. pt c/o nose bleed since 5am in L nostril. pt seen in ED last week for same complaint, had nasal septal perforation, was cauterized, bleeding stopped temporarily and started again. pt takes xarelto daily. denies injury, dizziness, lightheadedness, HA, head trama, sob, fatigue. md at bedside for eval. pt in NAD. respirations even and unlabored. bed in lowest position, side rails up, call bell in hand, safety maintained. awaiting further orders. will continue to monitor.

## 2021-11-30 NOTE — ED PROVIDER NOTE - PROGRESS NOTE DETAILS
Jean PGY3: Worsening bleeding also now from R nares despite afrin and lidocaine/epi. ENT consulted. Jean PGY3: bleeding resolved after placement of rhino rocket and surgicell by ENT. Now with minimal R nares bleeding, discussed with ENT and agrees with CDU.

## 2021-11-30 NOTE — ED PROVIDER NOTE - ATTENDING CONTRIBUTION TO CARE
vital signs: T(C): 36.6 (11-30-21 @ 07:44), Max: 36.6 (11-30-21 @ 07:44)  HR: 74 (11-30-21 @ 07:44) (74 - 74)  BP: 123/66 (11-30-21 @ 07:44) (123/66 - 123/66)  BP(mean): --  RR: 16 (11-30-21 @ 07:44) (16 - 16)  SpO2: 100% (11-30-21 @ 07:44) (100% - 100%)  GEN - NAD; well appearing; A+O x3   HEAD - NC/AT   EYES- PERRL, EOMI  ENT: Airway patent, mmm, +mild slow ooze of blood from left naris, no hematoma noted. Oral cavity and pharynx normal.  NECK: Neck supple, non-tender without lymphadenopathy, no masses.  PULMONARY - CTA b/l, symmetric breath sounds.   CARDIAC -s1s2, RRR, no M,G,R  ABDOMEN - +BS, ND, NT, soft, no guarding, no rebound, no masses   BACK - no CVA tenderness, Normal  spine   EXTREMITIES - FROM, no edema   SKIN - no rash or bruising   NEUROLOGIC - alert, speech clear, no focal deficits  PSYCH -nl mood/affect, nl insight.  a/p-patient presents with recurrent epistaxis that started early this morning, has since improved with direct pressure, now a slow ooze-appears to be from left naris as source, has h/o same last week found to have septal perforation, cauterized and monitored in cdu, has been fine since, on ac. No dizziness, ha, cp, sob, palps, difficulty swallowing, abd pain, vomiting, fevers. Feels otherwise well. Mild bleeding from left naris, will apply topical vasocontrictors, direct pressure, reassess if achieve hemostasis v. proceed to further interventions.

## 2021-11-30 NOTE — ED PROVIDER NOTE - OBJECTIVE STATEMENT
89yo female CABG on xarelto p/w epistaxis from L nostril lessened with direct pressure since 5am. Seen here for similar complaint 1 week ago, found to have anterior nasal septal perforation controlled with silver nitrate and absorbable packing and monitored in CDU. Denies syncope, other bleeding, nose trauma. Follows with [facility].

## 2021-11-30 NOTE — CONSULT NOTE ADULT - SUBJECTIVE AND OBJECTIVE BOX
CC: epistaxis     HPI: 89 year old female with a history of CAD and Afib, currently taking Xarelto that presents to the ED stating she has a nasal septal perforation and had episode of epistaxis last week. Was packed with surgicel and Also had silver nitrate cautery. Patient started having bleeding from left nares last PM and again this am. Denies any other complaints.       PAST MEDICAL & SURGICAL HISTORY:  S/P CABG x 1    No significant past surgical history      Allergies    sulfa drugs (Unknown)    Intolerances      MEDICATIONS  (STANDING):    MEDICATIONS  (PRN):      ROS:   ENT: all negative except as noted in HPI   CV: denies palpitations  Pulm: denies SOB, cough, hemoptysis  GI: denies change in apetite, indigestion, n/v  : denies pertinent urinary symptoms, urgency  Neuro: denies numbness/tingling, loss of sensation  Psych: denies anxiety  MS: denies muscle weakness, instability  Heme: denies easy bruising or bleeding  Endo: denies heat/cold intolerance, excessive sweating  Vascular: denies LE edema    Vital Signs Last 24 Hrs  T(C): 36.6 (30 Nov 2021 07:44), Max: 36.6 (30 Nov 2021 07:44)  T(F): 97.9 (30 Nov 2021 07:44), Max: 97.9 (30 Nov 2021 07:44)  HR: 74 (30 Nov 2021 07:44) (74 - 74)  BP: 123/66 (30 Nov 2021 07:44) (123/66 - 123/66)  BP(mean): --  RR: 16 (30 Nov 2021 07:44) (16 - 16)  SpO2: 100% (30 Nov 2021 07:44) (100% - 100%)              PHYSICAL EXAM:  Gen: NAD  Skin: No rashes, bruises, or lesions  Head: Normocephalic, Atraumatic  Face: no edema, erythema, or fluctuance. Parotid glands soft without mass  Eyes: no scleral injection  Ears: Right - ear canal clear, TM intact without effusion or erythema. No evidence of any fluid drainage. No mastoid tenderness, erythema, or ear bulging            Left - ear canal clear, TM intact without effusion or erythema. No evidence of any fluid drainage. No mastoid tenderness, erythema, or ear bulging  Nose: left sided bleeding with septal perforation. Bleeding through to right nares. Suctioned and sprayed afrin. Attempted packing with surgicel but continued to bleed. Unable to identify source. Packed with 7cm RR and packed left nares with surgicel. Bleeding was controlled and no bleeding from posterior pharynx.   Mouth: No Stridor / Drooling / Trismus.  Mucosa moist, tongue/uvula midline, oropharynx clear  Neck: Flat, supple, no lymphadenopathy, trachea midline, no masses  Lymphatic: No lymphadenopathy  Resp: breathing easily, no stridor  CV: no peripheral edema/cyanosis  GI: nondistended   Peripheral vascular: no JVD or edema  Neuro: facial nerve intact, no facial droop      IMAGING/ADDITIONAL STUDIES:

## 2021-11-30 NOTE — ED CDU PROVIDER INITIAL DAY NOTE - NSICDXPASTMEDICALHX_GEN_ALL_CORE_FT
PAST MEDICAL HISTORY:  S/P CABG x 1      PAST MEDICAL HISTORY:  Atrial fibrillation     S/P CABG x 1

## 2021-11-30 NOTE — ED ADULT NURSE NOTE - NSIMPLEMENTINTERV_GEN_ALL_ED
Implemented All Universal Safety Interventions:  [place] to call system. Call bell, personal items and telephone within reach. Instruct patient to call for assistance. Room bathroom lighting operational. Non-slip footwear when patient is off stretcher. Physically safe environment: no spills, clutter or unnecessary equipment. Stretcher in lowest position, wheels locked, appropriate side rails in place.

## 2021-12-01 ENCOUNTER — NON-APPOINTMENT (OUTPATIENT)
Age: 86
End: 2021-12-01

## 2021-12-01 VITALS
DIASTOLIC BLOOD PRESSURE: 66 MMHG | RESPIRATION RATE: 18 BRPM | OXYGEN SATURATION: 97 % | SYSTOLIC BLOOD PRESSURE: 140 MMHG | HEART RATE: 85 BPM | TEMPERATURE: 98 F

## 2021-12-01 LAB
ANION GAP SERPL CALC-SCNC: 11 MMOL/L — SIGNIFICANT CHANGE UP (ref 7–14)
BASOPHILS # BLD AUTO: 0.03 K/UL — SIGNIFICANT CHANGE UP (ref 0–0.2)
BASOPHILS NFR BLD AUTO: 0.5 % — SIGNIFICANT CHANGE UP (ref 0–2)
BUN SERPL-MCNC: 18 MG/DL — SIGNIFICANT CHANGE UP (ref 7–23)
CALCIUM SERPL-MCNC: 9.5 MG/DL — SIGNIFICANT CHANGE UP (ref 8.4–10.5)
CHLORIDE SERPL-SCNC: 104 MMOL/L — SIGNIFICANT CHANGE UP (ref 98–107)
CO2 SERPL-SCNC: 23 MMOL/L — SIGNIFICANT CHANGE UP (ref 22–31)
CREAT SERPL-MCNC: 0.72 MG/DL — SIGNIFICANT CHANGE UP (ref 0.5–1.3)
EOSINOPHIL # BLD AUTO: 0.08 K/UL — SIGNIFICANT CHANGE UP (ref 0–0.5)
EOSINOPHIL NFR BLD AUTO: 1.3 % — SIGNIFICANT CHANGE UP (ref 0–6)
GLUCOSE SERPL-MCNC: 92 MG/DL — SIGNIFICANT CHANGE UP (ref 70–99)
HCT VFR BLD CALC: 35.1 % — SIGNIFICANT CHANGE UP (ref 34.5–45)
HGB BLD-MCNC: 11.4 G/DL — LOW (ref 11.5–15.5)
IANC: 4.12 K/UL — SIGNIFICANT CHANGE UP (ref 1.5–8.5)
IMM GRANULOCYTES NFR BLD AUTO: 0.2 % — SIGNIFICANT CHANGE UP (ref 0–1.5)
LYMPHOCYTES # BLD AUTO: 1.26 K/UL — SIGNIFICANT CHANGE UP (ref 1–3.3)
LYMPHOCYTES # BLD AUTO: 20.3 % — SIGNIFICANT CHANGE UP (ref 13–44)
MCHC RBC-ENTMCNC: 31.6 PG — SIGNIFICANT CHANGE UP (ref 27–34)
MCHC RBC-ENTMCNC: 32.5 GM/DL — SIGNIFICANT CHANGE UP (ref 32–36)
MCV RBC AUTO: 97.2 FL — SIGNIFICANT CHANGE UP (ref 80–100)
MONOCYTES # BLD AUTO: 0.71 K/UL — SIGNIFICANT CHANGE UP (ref 0–0.9)
MONOCYTES NFR BLD AUTO: 11.4 % — SIGNIFICANT CHANGE UP (ref 2–14)
NEUTROPHILS # BLD AUTO: 4.12 K/UL — SIGNIFICANT CHANGE UP (ref 1.8–7.4)
NEUTROPHILS NFR BLD AUTO: 66.3 % — SIGNIFICANT CHANGE UP (ref 43–77)
NRBC # BLD: 0 /100 WBCS — SIGNIFICANT CHANGE UP
NRBC # FLD: 0 K/UL — SIGNIFICANT CHANGE UP
PLATELET # BLD AUTO: 148 K/UL — LOW (ref 150–400)
POTASSIUM SERPL-MCNC: 4.3 MMOL/L — SIGNIFICANT CHANGE UP (ref 3.5–5.3)
POTASSIUM SERPL-SCNC: 4.3 MMOL/L — SIGNIFICANT CHANGE UP (ref 3.5–5.3)
RBC # BLD: 3.61 M/UL — LOW (ref 3.8–5.2)
RBC # FLD: 14 % — SIGNIFICANT CHANGE UP (ref 10.3–14.5)
SODIUM SERPL-SCNC: 138 MMOL/L — SIGNIFICANT CHANGE UP (ref 135–145)
WBC # BLD: 6.21 K/UL — SIGNIFICANT CHANGE UP (ref 3.8–10.5)
WBC # FLD AUTO: 6.21 K/UL — SIGNIFICANT CHANGE UP (ref 3.8–10.5)

## 2021-12-01 PROCEDURE — 99217: CPT

## 2021-12-01 RX ORDER — LANOLIN ALCOHOL/MO/W.PET/CERES
6 CREAM (GRAM) TOPICAL ONCE
Refills: 0 | Status: COMPLETED | OUTPATIENT
Start: 2021-12-01 | End: 2021-12-01

## 2021-12-01 RX ORDER — ACETAMINOPHEN 500 MG
975 TABLET ORAL ONCE
Refills: 0 | Status: COMPLETED | OUTPATIENT
Start: 2021-12-01 | End: 2021-12-01

## 2021-12-01 RX ORDER — CEPHALEXIN 500 MG
1 CAPSULE ORAL
Qty: 14 | Refills: 0
Start: 2021-12-01 | End: 2021-12-07

## 2021-12-01 RX ADMIN — Medication 325 MILLIGRAM(S): at 00:03

## 2021-12-01 RX ADMIN — Medication 50 MILLIGRAM(S): at 05:52

## 2021-12-01 RX ADMIN — Medication 120 MILLIGRAM(S): at 05:52

## 2021-12-01 RX ADMIN — Medication 6 MILLIGRAM(S): at 02:45

## 2021-12-01 RX ADMIN — Medication 975 MILLIGRAM(S): at 03:45

## 2021-12-01 RX ADMIN — Medication 40 MILLIGRAM(S): at 05:51

## 2021-12-01 RX ADMIN — Medication 975 MILLIGRAM(S): at 02:45

## 2021-12-01 RX ADMIN — Medication 50 MICROGRAM(S): at 05:56

## 2021-12-01 RX ADMIN — Medication 5 MILLIGRAM(S): at 05:53

## 2021-12-01 NOTE — ED CDU PROVIDER SUBSEQUENT DAY NOTE - HISTORY
89 y/o female pmh htn, hld, afib on xarelto, CABG, c/o epistaxis. Pt was seen and eval by ENT and a rhino rocket was placed in the L nare and surgicel was placed in the L. Pt placed in CDU for monitoring and possible bleeding control.    CDU SKYE Vaughan: Agree with above, 89 Y/O F PMH HLD, HTN, A-Fib on Xarelto presents with recurrent epistaxis. Pt has been stable while in CDU, no active bleeding. Plan is ENT reassessment, likely outpatient ENT follow up.

## 2021-12-01 NOTE — ED CDU PROVIDER SUBSEQUENT DAY NOTE - PHYSICAL EXAMINATION
HEENT: Uvula is midline and rises symmetrically with phonation. No abscess or stridor. No active epistaxis, Surgicel in R nares, nasal packing in L nares.

## 2021-12-01 NOTE — ED CDU PROVIDER SUBSEQUENT DAY NOTE - PROGRESS NOTE DETAILS
SKYE Jenkins: 89 y/o female with a hx of HTN, HLD, A Fib on Xarelto, CABG, presented to the ER c/o nosebleed.  Pt seen by ENT Rhino naila placed.  Pt placed in CDU for observation.  Pt feels better, bleeding resolved.  Pt seen and cleared by ENT advised to discharge home on Keflex and follow up outpatient with her ENT.  Pt feels better, and requesting to be discharged home.  Discharge and results reviewed with patient.  Pt has an appointment tomorrow with her ENT.  Pt ambulatory without difficulty.

## 2021-12-01 NOTE — ED CDU PROVIDER DISPOSITION NOTE - NSFOLLOWUPINSTRUCTIONS_ED_ALL_ED_FT
Follow up with your Primary Medical Doctor in 1-2 days.  Follow up with your Ear Nose and Throat specialist in 1-2 days for rhino rocket removal and reassessment.  Take Keflex 500mg orally 2 x a day x 7 days.  Return to the ER for any persistent/worsening or new symptoms fevers, chills, bleeding, weakness, dizziness, chest pain, shortness of breath or any concerning symptoms.          Nosebleed    WHAT YOU NEED TO KNOW:    A nosebleed, or epistaxis, occurs when one or more of the blood vessels in your nose break. You may have dark or bright red blood from one or both nostrils. A nosebleed is most commonly caused by dry air or picking your nose. A direct blow to your nose, irritation from a cold or allergies, or a foreign object can also cause a nosebleed.     DISCHARGE INSTRUCTIONS:    Return to the emergency department if:   •Your nasal packing is soaked with blood.      •Your nose is still bleeding after 20 minutes, even after you pinch it.       •You have a foul-smelling discharge coming out of your nose.      •You feel so weak and dizzy that you have trouble standing up.      •You have trouble breathing or talking.       Contact your healthcare provider if:   •You have a fever and are vomiting.      •You have pain in and around your nose that is getting worse even after you take pain medicines.      •Your nasal pack is loose.      •You have questions or concerns about your condition or care.      First aid:   •Sit up and lean forward. This will help prevent you from swallowing blood. Spit blood and saliva into a bowl.       •Apply pressure to your nose. Use 2 fingers to pinch your nose shut for 10       •Apply ice on the bridge of your nose to decrease swelling and bleeding. Use a cold pack or put crushed ice in a plastic bag. Cover it with a towel to protect your skin.      •Pack your nose with a cotton ball, tissue, tampon, or gauze bandage to stop the bleeding.      Medicines:   •Medicines applied to a small piece of cotton and placed in your nose. Medicine may also be sprayed in or applied directly to your nose. You may need medicine to prevent an infection. If bleeding is severe, medicine may be injected into a blood vessel in your nose.       •Take your medicine as directed. Contact your healthcare provider if you think your medicine is not helping or if you have side effects. Tell him of her if you are allergic to any medicine. Keep a list of the medicines, vitamins, and herbs you take. Include the amounts, and when and why you take them. Bring the list or the pill bottles to follow-up visits. Carry your medicine list with you in case of an emergency.      Prevent another nosebleed:   •Keep your nose moist. Put a small amount of petroleum jelly inside your nostrils as needed. Use a saline (saltwater) nasal spray. Do not put anything else inside your nose unless your healthcare provider says it is okay. Do not use oil-based lubricants if you use oxygen therapy. They may be flammable.      •Use a cool mist humidifier to increase air moisture in your home. This will help your nose stay moist.       •Do not pick or blow your nose for at least a week. You can irritate or damage your nose if you pick it. Blowing your nose too hard may cause the bleeding to start again. Do not bend over or strain as this can cause the bleeding to start again.      •Avoid irritants such as tobacco smoke or chemical sprays such as .      Follow up with your healthcare provider as directed: Any packing in your nose should be removed within 2 to 3 days. Write down your questions so you remember to ask them during your visits.        © Copyright [employer] 2021

## 2021-12-01 NOTE — ED CDU PROVIDER DISPOSITION NOTE - ATTENDING CONTRIBUTION TO CARE
89 y/o female with a hx of HTN, HLD, A Fib on Xarelto, CABG, presented to the ER c/o nosebleed.  Pt seen by ENT Rhinabdullahi yoo placed.  Pt placed in CDU for observation.  Pt feels better, bleeding resolved.  Pt seen and cleared by ENT advised to discharge home on Keflex and follow up outpatient with her ENT.  Pt feels better, and requesting to be discharged home.  Discharge and results reviewed with patient.

## 2021-12-01 NOTE — ED CDU PROVIDER SUBSEQUENT DAY NOTE - MEDICAL DECISION MAKING DETAILS
89 Y/O F PMH HLD, HTN, A-Fib on Xarelto presents with recurrent epistaxis. Pt has been stable while in CDU, no active bleeding. Plan is continued observation to ensure Epistaxis is resolved, ENT reassessment.

## 2021-12-01 NOTE — ED CDU PROVIDER DISPOSITION NOTE - CLINICAL COURSE
SKYE Jenkins: 89 y/o female with a hx of HTN, HLD, A Fib on Xarelto, CABG, presented to the ER c/o nosebleed.  Pt seen by ENT Rhinabdullahi yoo placed.  Pt placed in CDU for observation.  Pt feels better, bleeding resolved.  Pt seen and cleared by ENT advised to discharge home on Keflex and follow up outpatient with her ENT.  Pt feels better, and requesting to be discharged home.  Discharge and results reviewed with patient.

## 2021-12-01 NOTE — PROGRESS NOTE ADULT - PROBLEM SELECTOR PLAN 1
1. Continue with packing and follow up with outpt ENT for removal thursday or Friday  2. Keflex 500 bid while packing is in place.   3. Follow up with cardiologist to see if Xarelto can be held longer. Patient Holding for now

## 2021-12-01 NOTE — ED CDU PROVIDER DISPOSITION NOTE - PATIENT PORTAL LINK FT
You can access the FollowMyHealth Patient Portal offered by [facility] by registering at the following website: [email or web address]. By joining [employer]’s FollowMyHealth portal, you will also be able to view your health information using other applications (apps) compatible with our system.

## 2021-12-01 NOTE — ED CDU PROVIDER SUBSEQUENT DAY NOTE - ATTENDING CONTRIBUTION TO CARE
89 y/o female pmh htn, hld, afib on xarelto, CABG, c/o epistaxis. Pt was seen and eval by ENT and a rhino rocket was placed in the L nare and surgicel was placed in the L. Pt placed in CDU for monitoring and possible bleeding control. no further bleeding in CDU, seen by ENT this am, ok for dc with antibiotics

## 2021-12-03 ENCOUNTER — APPOINTMENT (OUTPATIENT)
Dept: INTERNAL MEDICINE | Facility: CLINIC | Age: 86
End: 2021-12-03
Payer: MEDICARE

## 2021-12-03 VITALS
WEIGHT: 121 LBS | HEART RATE: 78 BPM | DIASTOLIC BLOOD PRESSURE: 70 MMHG | SYSTOLIC BLOOD PRESSURE: 115 MMHG | BODY MASS INDEX: 20.66 KG/M2 | HEIGHT: 64 IN | RESPIRATION RATE: 16 BRPM

## 2021-12-03 DIAGNOSIS — R04.0 EPISTAXIS: ICD-10-CM

## 2021-12-03 PROCEDURE — 99214 OFFICE O/P EST MOD 30 MIN: CPT

## 2021-12-03 RX ORDER — TRAMADOL HYDROCHLORIDE 50 MG/1
50 TABLET, COATED ORAL
Qty: 60 | Refills: 0 | Status: DISCONTINUED | COMMUNITY
Start: 2018-05-17 | End: 2021-12-03

## 2021-12-03 NOTE — ASSESSMENT
[FreeTextEntry1] : Pt with above medical issues.\par Off Xarelto for now - pt will fu with cardiology \par BP is controlled\par To use humidifier.

## 2021-12-03 NOTE — PHYSICAL EXAM
[Normal Sclera/Conjunctiva] : normal sclera/conjunctiva [Normal Rate] : normal rate  [Normal S1, S2] : normal S1 and S2 [No Edema] : there was no peripheral edema [Normal] : soft, non-tender, non-distended, no masses palpated, no HSM and normal bowel sounds [de-identified] : nose - not bleeding - some crusting in nose

## 2021-12-03 NOTE — HISTORY OF PRESENT ILLNESS
[FreeTextEntry1] : FU after ER and seeing ENT - Dr. Godoy - (not in today)\par Had packing jeff=moved yesterday\par No further bleeding\par Xarelto on hold

## 2021-12-08 RX ORDER — RIVAROXABAN 10 MG/1
10 TABLET, FILM COATED ORAL
Qty: 30 | Refills: 1 | Status: ACTIVE | COMMUNITY
Start: 2021-04-13

## 2021-12-13 ENCOUNTER — APPOINTMENT (OUTPATIENT)
Dept: CARDIOLOGY | Facility: CLINIC | Age: 86
End: 2021-12-13
Payer: MEDICARE

## 2021-12-13 ENCOUNTER — NON-APPOINTMENT (OUTPATIENT)
Age: 86
End: 2021-12-13

## 2021-12-13 VITALS
SYSTOLIC BLOOD PRESSURE: 119 MMHG | HEART RATE: 77 BPM | OXYGEN SATURATION: 93 % | BODY MASS INDEX: 20.66 KG/M2 | HEIGHT: 64 IN | WEIGHT: 121 LBS | DIASTOLIC BLOOD PRESSURE: 64 MMHG

## 2021-12-13 DIAGNOSIS — Z98.890 OTHER SPECIFIED POSTPROCEDURAL STATES: ICD-10-CM

## 2021-12-13 PROCEDURE — 93280 PM DEVICE PROGR EVAL DUAL: CPT

## 2021-12-13 PROCEDURE — 93000 ELECTROCARDIOGRAM COMPLETE: CPT | Mod: 59

## 2021-12-13 PROCEDURE — 99214 OFFICE O/P EST MOD 30 MIN: CPT

## 2021-12-13 PROCEDURE — 93306 TTE W/DOPPLER COMPLETE: CPT

## 2021-12-13 NOTE — HISTORY OF PRESENT ILLNESS
[FreeTextEntry1] : She presents in scheduled followup reporting that she has been feeling well.  With the exception of 3 significant episodes of epistaxis.  Evaluated in the [place] ED and eventually required nasal balloon and cautery which was performed by Dr. Neo Ballard.  Last episode was 2 weeks ago.  She spoke with Dr. Gray who advised reducing her Xarelto to 10 mg once daily.\par \par Edema (left greater than right) is well controlled with the use of compression stockings.  \par \par No c/o chest, throat,jaw, arm or upper back discomfort.  No dyspnea, orthopnea or PND.  No Palpitations, dizziness or syncope.  No claudication.\par \par Utilizing a room humidifier on her night table daily.\par

## 2022-01-04 ENCOUNTER — NON-APPOINTMENT (OUTPATIENT)
Age: 87
End: 2022-01-04

## 2022-02-11 ENCOUNTER — NON-APPOINTMENT (OUTPATIENT)
Age: 87
End: 2022-02-11

## 2022-02-14 ENCOUNTER — NON-APPOINTMENT (OUTPATIENT)
Age: 87
End: 2022-02-14

## 2022-02-14 ENCOUNTER — APPOINTMENT (OUTPATIENT)
Dept: INTERNAL MEDICINE | Facility: CLINIC | Age: 87
End: 2022-02-14
Payer: MEDICARE

## 2022-02-14 VITALS
BODY MASS INDEX: 19.63 KG/M2 | DIASTOLIC BLOOD PRESSURE: 60 MMHG | HEIGHT: 64 IN | SYSTOLIC BLOOD PRESSURE: 120 MMHG | HEART RATE: 70 BPM | WEIGHT: 115 LBS | RESPIRATION RATE: 16 BRPM

## 2022-02-14 DIAGNOSIS — R73.03 PREDIABETES.: ICD-10-CM

## 2022-02-14 DIAGNOSIS — Z82.0 FAMILY HISTORY OF EPILEPSY AND OTHER DISEASES OF THE NERVOUS SYSTEM: ICD-10-CM

## 2022-02-14 DIAGNOSIS — M25.473 EFFUSION, UNSPECIFIED ANKLE: ICD-10-CM

## 2022-02-14 DIAGNOSIS — E03.9 HYPOTHYROIDISM, UNSPECIFIED: ICD-10-CM

## 2022-02-14 DIAGNOSIS — Z84.89 FAMILY HISTORY OF OTHER SPECIFIED CONDITIONS: ICD-10-CM

## 2022-02-14 DIAGNOSIS — I25.10 ATHEROSCLEROTIC HEART DISEASE OF NATIVE CORONARY ARTERY W/OUT ANGINA PECTORIS: ICD-10-CM

## 2022-02-14 DIAGNOSIS — Z00.00 ENCOUNTER FOR GENERAL ADULT MEDICAL EXAMINATION W/OUT ABNORMAL FINDINGS: ICD-10-CM

## 2022-02-14 DIAGNOSIS — K21.9 GASTRO-ESOPHAGEAL REFLUX DISEASE W/OUT ESOPHAGITIS: ICD-10-CM

## 2022-02-14 DIAGNOSIS — E55.9 VITAMIN D DEFICIENCY, UNSPECIFIED: ICD-10-CM

## 2022-02-14 PROCEDURE — G0444 DEPRESSION SCREEN ANNUAL: CPT | Mod: 59

## 2022-02-14 PROCEDURE — 99497 ADVNCD CARE PLAN 30 MIN: CPT

## 2022-02-14 PROCEDURE — 36415 COLL VENOUS BLD VENIPUNCTURE: CPT

## 2022-02-14 PROCEDURE — 93000 ELECTROCARDIOGRAM COMPLETE: CPT | Mod: 59

## 2022-02-14 PROCEDURE — G0439: CPT

## 2022-02-14 PROCEDURE — 99214 OFFICE O/P EST MOD 30 MIN: CPT | Mod: 25

## 2022-02-14 RX ORDER — BETAMETHASONE DIPROPIONATE 0.5 MG/G
0.05 CREAM TOPICAL
Qty: 45 | Refills: 0 | Status: DISCONTINUED | COMMUNITY
Start: 2021-03-30

## 2022-02-14 RX ORDER — RIVAROXABAN 15 MG/1
15 TABLET, FILM COATED ORAL
Qty: 90 | Refills: 0 | Status: DISCONTINUED | COMMUNITY
Start: 2021-11-06

## 2022-02-14 RX ORDER — DILTIAZEM HYDROCHLORIDE 120 MG/1
120 CAPSULE, EXTENDED RELEASE ORAL
Qty: 90 | Refills: 0 | Status: DISCONTINUED | COMMUNITY
Start: 2021-11-08

## 2022-02-14 RX ORDER — AMOXICILLIN 500 MG/1
500 CAPSULE ORAL
Qty: 4 | Refills: 0 | Status: DISCONTINUED | COMMUNITY
Start: 2021-10-13

## 2022-02-14 RX ORDER — OLOPATADINE HYDROCHLORIDE 2 MG/ML
0.2 SOLUTION OPHTHALMIC
Qty: 2 | Refills: 0 | Status: DISCONTINUED | COMMUNITY
Start: 2021-03-29

## 2022-02-14 RX ORDER — CEPHALEXIN 500 MG/1
500 CAPSULE ORAL
Qty: 14 | Refills: 0 | Status: DISCONTINUED | COMMUNITY
Start: 2021-12-01

## 2022-02-14 RX ORDER — TAVABOROLE 43.5 MG/ML
5 SOLUTION TOPICAL
Qty: 10 | Refills: 0 | Status: DISCONTINUED | COMMUNITY
Start: 2021-12-29

## 2022-02-14 RX ORDER — DICLOFENAC SODIUM 1% 10 MG/G
1 GEL TOPICAL
Qty: 200 | Refills: 0 | Status: DISCONTINUED | COMMUNITY
Start: 2021-07-28

## 2022-02-14 RX ORDER — HYDROCORTISONE 25 MG/G
2.5 CREAM TOPICAL
Qty: 30 | Refills: 0 | Status: DISCONTINUED | COMMUNITY
Start: 2022-01-27

## 2022-02-14 NOTE — PHYSICAL EXAM
[No Acute Distress] : no acute distress [Well Nourished] : well nourished [Well Developed] : well developed [Well-Appearing] : well-appearing [Normal Sclera/Conjunctiva] : normal sclera/conjunctiva [PERRL] : pupils equal round and reactive to light [EOMI] : extraocular movements intact [Normal Outer Ear/Nose] : the outer ears and nose were normal in appearance [Normal Oropharynx] : the oropharynx was normal [Normal TMs] : both tympanic membranes were normal [No JVD] : no jugular venous distention [No Lymphadenopathy] : no lymphadenopathy [Supple] : supple [Thyroid Normal, No Nodules] : the thyroid was normal and there were no nodules present [No Respiratory Distress] : no respiratory distress  [No Accessory Muscle Use] : no accessory muscle use [Clear to Auscultation] : lungs were clear to auscultation bilaterally [Normal Rate] : normal rate  [Regular Rhythm] : with a regular rhythm [Normal S1, S2] : normal S1 and S2 [No Murmur] : no murmur heard [No Carotid Bruits] : no carotid bruits [No Abdominal Bruit] : a ~M bruit was not heard ~T in the abdomen [Pedal Pulses Present] : the pedal pulses are present [No Palpable Aorta] : no palpable aorta [No Extremity Clubbing/Cyanosis] : no extremity clubbing/cyanosis [Normal Appearance] : normal in appearance [No Nipple Discharge] : no nipple discharge [No Axillary Lymphadenopathy] : no axillary lymphadenopathy [Soft] : abdomen soft [Non Tender] : non-tender [Non-distended] : non-distended [No Masses] : no abdominal mass palpated [No HSM] : no HSM [Normal Bowel Sounds] : normal bowel sounds [Declined Rectal Exam] : declined rectal exam [Normal Supraclavicular Nodes] : no supraclavicular lymphadenopathy [Normal Axillary Nodes] : no axillary lymphadenopathy [Normal Posterior Cervical Nodes] : no posterior cervical lymphadenopathy [Normal Anterior Cervical Nodes] : no anterior cervical lymphadenopathy [No CVA Tenderness] : no CVA  tenderness [No Spinal Tenderness] : no spinal tenderness [No Joint Swelling] : no joint swelling [Grossly Normal Strength/Tone] : grossly normal strength/tone [No Rash] : no rash [Coordination Grossly Intact] : coordination grossly intact [No Focal Deficits] : no focal deficits [Normal Gait] : normal gait [Deep Tendon Reflexes (DTR)] : deep tendon reflexes were 2+ and symmetric [Normal Affect] : the affect was normal [Alert and Oriented x3] : oriented to person, place, and time [Normal Insight/Judgement] : insight and judgment were intact [de-identified] : trace edema left ankle [de-identified] : Declines [de-identified] : DIPESH

## 2022-02-14 NOTE — HEALTH RISK ASSESSMENT
[Fair] :  ~his/her~ mood as fair [Never] : Never [Yes] : Yes [2 - 4 times a month (2 pts)] : 2-4 times a month (2 points) [1 or 2 (0 pts)] : 1 or 2 (0 points) [Never (0 pts)] : Never (0 points) [No falls in past year] : Patient reported no falls in the past year [PHQ-2 Negative - No further assessment needed] : PHQ-2 Negative - No further assessment needed [Patient declined mammogram] : Patient declined mammogram [None] : None [Alone] : lives alone [Retired] : retired [] :  [Feels Safe at Home] : Feels safe at home [Fully functional (bathing, dressing, toileting, transferring, walking, feeding)] : Fully functional (bathing, dressing, toileting, transferring, walking, feeding) [Fully functional (using the telephone, shopping, preparing meals, housekeeping, doing laundry, using] : Fully functional and needs no help or supervision to perform IADLs (using the telephone, shopping, preparing meals, housekeeping, doing laundry, using transportation, managing medications and managing finances) [Smoke Detector] : smoke detector [Carbon Monoxide Detector] : carbon monoxide detector [Seat Belt] :  uses seat belt [Sunscreen] : uses sunscreen [With Patient/Caregiver] : , with patient/caregiver [Designated Healthcare Proxy] : Designated healthcare proxy [Name: ___] : Health Care Proxy's Name: [unfilled]  [Time Spent: ___ minutes] : Time Spent: [unfilled] minutes [de-identified] : Walking [de-identified] : Low salt and fat [Change in mental status noted] : No change in mental status noted [AdvancecareDate] : 02/22 [FreeTextEntry4] : Had long discussion with the patient.\par Discussed with pt the need for a health care proxy.\par Discussed who can be a proxy, forms given if needed, and the need for the proxy to know their wishes and to make sure they will comply.\par The proxy will need to have a copy in their home and the patient should have a copy.\par \par Son has a copy of the proxy - wishes are known

## 2022-02-14 NOTE — HISTORY OF PRESENT ILLNESS
[FreeTextEntry1] : Well visit and follow up for management of:\par Afib - on meds\par Hyperlipidemia - on meds\par Hypothyroidism - on meds\par Anxiety - on meds\par

## 2022-02-14 NOTE — REVIEW OF SYSTEMS
[Recent Change In Weight] : ~T recent weight change [Hearing Loss] : hearing loss [Palpitations] : palpitations [Joint Pain] : joint pain [Negative] : Psychiatric [Fever] : no fever [Chest Pain] : no chest pain [FreeTextEntry3] : last optho - few weeks [de-identified] : sees derm

## 2022-02-14 NOTE — ASSESSMENT
[FreeTextEntry1] : Pt with above medical issues which requires extra work in addition to the well visit.\par Bloods drawn in office.\par Meds to be adjusted.\par Health counseling given.\par Has not been eating as much - sister has recently passed away.\par \par

## 2022-02-15 ENCOUNTER — NON-APPOINTMENT (OUTPATIENT)
Age: 87
End: 2022-02-15

## 2022-02-15 LAB
25(OH)D3 SERPL-MCNC: 30.3 NG/ML
ALBUMIN SERPL ELPH-MCNC: 4.5 G/DL
ALP BLD-CCNC: 78 U/L
ALT SERPL-CCNC: 11 U/L
ANION GAP SERPL CALC-SCNC: 13 MMOL/L
APPEARANCE: CLEAR
AST SERPL-CCNC: 15 U/L
BASOPHILS # BLD AUTO: 0.03 K/UL
BASOPHILS NFR BLD AUTO: 0.6 %
BILIRUB SERPL-MCNC: 0.4 MG/DL
BILIRUBIN URINE: NEGATIVE
BLOOD URINE: NEGATIVE
BUN SERPL-MCNC: 24 MG/DL
CALCIUM SERPL-MCNC: 9.7 MG/DL
CHLORIDE SERPL-SCNC: 105 MMOL/L
CHOLEST SERPL-MCNC: 164 MG/DL
CO2 SERPL-SCNC: 28 MMOL/L
COLOR: NORMAL
CREAT SERPL-MCNC: 1.1 MG/DL
EOSINOPHIL # BLD AUTO: 0.03 K/UL
EOSINOPHIL NFR BLD AUTO: 0.6 %
ESTIMATED AVERAGE GLUCOSE: 111 MG/DL
GLUCOSE QUALITATIVE U: NEGATIVE
GLUCOSE SERPL-MCNC: 102 MG/DL
HBA1C MFR BLD HPLC: 5.5 %
HCT VFR BLD CALC: 39.3 %
HDLC SERPL-MCNC: 78 MG/DL
HGB BLD-MCNC: 12 G/DL
IMM GRANULOCYTES NFR BLD AUTO: 0.2 %
KETONES URINE: NEGATIVE
LDLC SERPL CALC-MCNC: 65 MG/DL
LEUKOCYTE ESTERASE URINE: NEGATIVE
LYMPHOCYTES # BLD AUTO: 1.08 K/UL
LYMPHOCYTES NFR BLD AUTO: 20.5 %
MAN DIFF?: NORMAL
MCHC RBC-ENTMCNC: 30 PG
MCHC RBC-ENTMCNC: 30.5 GM/DL
MCV RBC AUTO: 98.3 FL
MONOCYTES # BLD AUTO: 0.69 K/UL
MONOCYTES NFR BLD AUTO: 13.1 %
NEUTROPHILS # BLD AUTO: 3.42 K/UL
NEUTROPHILS NFR BLD AUTO: 65 %
NITRITE URINE: NEGATIVE
NONHDLC SERPL-MCNC: 86 MG/DL
PH URINE: 6
PLATELET # BLD AUTO: 195 K/UL
POTASSIUM SERPL-SCNC: 4.4 MMOL/L
PROT SERPL-MCNC: 6.8 G/DL
PROTEIN URINE: NEGATIVE
RBC # BLD: 4 M/UL
RBC # FLD: 13.9 %
SODIUM SERPL-SCNC: 145 MMOL/L
SPECIFIC GRAVITY URINE: 1.01
TRIGL SERPL-MCNC: 106 MG/DL
TSH SERPL-ACNC: 1.08 UIU/ML
UROBILINOGEN URINE: NORMAL
WBC # FLD AUTO: 5.26 K/UL

## 2022-03-01 ENCOUNTER — NON-APPOINTMENT (OUTPATIENT)
Age: 87
End: 2022-03-01

## 2022-03-02 ENCOUNTER — NON-APPOINTMENT (OUTPATIENT)
Age: 87
End: 2022-03-02

## 2022-03-16 ENCOUNTER — APPOINTMENT (OUTPATIENT)
Dept: DERMATOLOGY | Facility: CLINIC | Age: 87
End: 2022-03-16
Payer: MEDICARE

## 2022-03-16 DIAGNOSIS — C44.311 BASAL CELL CARCINOMA OF SKIN OF NOSE: ICD-10-CM

## 2022-03-16 PROCEDURE — 99204 OFFICE O/P NEW MOD 45 MIN: CPT

## 2022-03-17 ENCOUNTER — NON-APPOINTMENT (OUTPATIENT)
Age: 87
End: 2022-03-17

## 2022-03-17 ENCOUNTER — APPOINTMENT (OUTPATIENT)
Dept: CARDIOLOGY | Facility: CLINIC | Age: 87
End: 2022-03-17
Payer: MEDICARE

## 2022-03-17 VITALS
DIASTOLIC BLOOD PRESSURE: 66 MMHG | OXYGEN SATURATION: 93 % | WEIGHT: 116 LBS | HEART RATE: 74 BPM | SYSTOLIC BLOOD PRESSURE: 120 MMHG | BODY MASS INDEX: 19.91 KG/M2

## 2022-03-17 DIAGNOSIS — R63.4 ABNORMAL WEIGHT LOSS: ICD-10-CM

## 2022-03-17 DIAGNOSIS — I47.2 VENTRICULAR TACHYCARDIA: ICD-10-CM

## 2022-03-17 DIAGNOSIS — I48.91 UNSPECIFIED ATRIAL FIBRILLATION: ICD-10-CM

## 2022-03-17 DIAGNOSIS — I48.0 PAROXYSMAL ATRIAL FIBRILLATION: ICD-10-CM

## 2022-03-17 DIAGNOSIS — I10 ESSENTIAL (PRIMARY) HYPERTENSION: ICD-10-CM

## 2022-03-17 DIAGNOSIS — E78.5 HYPERLIPIDEMIA, UNSPECIFIED: ICD-10-CM

## 2022-03-17 DIAGNOSIS — Z95.0 PRESENCE OF CARDIAC PACEMAKER: ICD-10-CM

## 2022-03-17 PROBLEM — C44.311 BASAL CELL CARCINOMA (BCC) OF RIGHT SIDE OF NOSE: Status: ACTIVE | Noted: 2022-03-16

## 2022-03-17 PROCEDURE — 93000 ELECTROCARDIOGRAM COMPLETE: CPT

## 2022-03-17 PROCEDURE — 99214 OFFICE O/P EST MOD 30 MIN: CPT

## 2022-03-17 NOTE — HISTORY OF PRESENT ILLNESS
[FreeTextEntry1] : She presents in scheduled followup reporting that she has been feeling well.  Her only sister  a few months ago after a protracted illness with Alzheimer's disease.  She is still grieving and has lost some weight.  Notes general fatigue and occasional dyspnea if she walks more than moderate distances.  No associated chest, throat or jaw discomfort.  Rare, random fleeting palpitations. \par \par No recurrence of epistaxis.  Her principal concern other than the weight loss, relates to easy bruising despite reduction of the Xarelto to 10 mg daily.  \par \par Edema (left greater than right) is well controlled with the use of compression stockings.  \par \par No orthopnea or PND.  No Palpitations, dizziness or syncope.  No claudication.\par \par 1 near fall when she arose quickly to answer the phone.\par

## 2022-03-18 ENCOUNTER — RX RENEWAL (OUTPATIENT)
Age: 87
End: 2022-03-18

## 2022-03-18 RX ORDER — LEVOTHYROXINE SODIUM 0.05 MG/1
50 TABLET ORAL DAILY
Qty: 90 | Refills: 2 | Status: ACTIVE | COMMUNITY
Start: 2017-06-14 | End: 1900-01-01

## 2022-03-30 ENCOUNTER — RX RENEWAL (OUTPATIENT)
Age: 87
End: 2022-03-30

## 2022-03-30 RX ORDER — ATORVASTATIN CALCIUM 20 MG/1
20 TABLET, FILM COATED ORAL DAILY
Qty: 90 | Refills: 0 | Status: ACTIVE | COMMUNITY
Start: 2017-06-14 | End: 1900-01-01

## 2022-04-19 RX ORDER — LORAZEPAM 2 MG/1
2 TABLET ORAL
Qty: 45 | Refills: 0 | Status: ACTIVE | COMMUNITY
Start: 2018-05-17 | End: 1900-01-01

## 2022-04-20 ENCOUNTER — RX RENEWAL (OUTPATIENT)
Age: 87
End: 2022-04-20

## 2022-04-20 RX ORDER — SOLIFENACIN SUCCINATE 5 MG/1
5 TABLET ORAL
Qty: 90 | Refills: 1 | Status: ACTIVE | COMMUNITY
Start: 2017-06-14 | End: 1900-01-01

## 2022-04-26 ENCOUNTER — NON-APPOINTMENT (OUTPATIENT)
Age: 87
End: 2022-04-26

## 2022-05-13 ENCOUNTER — INPATIENT (INPATIENT)
Facility: HOSPITAL | Age: 87
LOS: 11 days | Discharge: OTHER INSTITUTION | End: 2022-05-25
Attending: STUDENT IN AN ORGANIZED HEALTH CARE EDUCATION/TRAINING PROGRAM | Admitting: STUDENT IN AN ORGANIZED HEALTH CARE EDUCATION/TRAINING PROGRAM
Payer: MEDICARE

## 2022-05-13 VITALS
DIASTOLIC BLOOD PRESSURE: 82 MMHG | SYSTOLIC BLOOD PRESSURE: 145 MMHG | HEART RATE: 110 BPM | RESPIRATION RATE: 26 BRPM | OXYGEN SATURATION: 91 %

## 2022-05-13 DIAGNOSIS — Z95.2 PRESENCE OF PROSTHETIC HEART VALVE: Chronic | ICD-10-CM

## 2022-05-13 DIAGNOSIS — R41.82 ALTERED MENTAL STATUS, UNSPECIFIED: ICD-10-CM

## 2022-05-13 LAB
ALBUMIN SERPL ELPH-MCNC: 3.9 G/DL — SIGNIFICANT CHANGE UP (ref 3.3–5)
ALP SERPL-CCNC: 77 U/L — SIGNIFICANT CHANGE UP (ref 40–120)
ALT FLD-CCNC: 24 U/L — SIGNIFICANT CHANGE UP (ref 4–33)
ANION GAP SERPL CALC-SCNC: 22 MMOL/L — HIGH (ref 7–14)
APPEARANCE UR: ABNORMAL
APTT BLD: 23.7 SEC — LOW (ref 27–36.3)
AST SERPL-CCNC: 42 U/L — HIGH (ref 4–32)
B PERT DNA SPEC QL NAA+PROBE: SIGNIFICANT CHANGE UP
B PERT+PARAPERT DNA PNL SPEC NAA+PROBE: SIGNIFICANT CHANGE UP
BASOPHILS # BLD AUTO: 0.02 K/UL — SIGNIFICANT CHANGE UP (ref 0–0.2)
BASOPHILS NFR BLD AUTO: 0.1 % — SIGNIFICANT CHANGE UP (ref 0–2)
BILIRUB SERPL-MCNC: 1 MG/DL — SIGNIFICANT CHANGE UP (ref 0.2–1.2)
BILIRUB UR-MCNC: NEGATIVE — SIGNIFICANT CHANGE UP
BORDETELLA PARAPERTUSSIS (RAPRVP): SIGNIFICANT CHANGE UP
BUN SERPL-MCNC: 24 MG/DL — HIGH (ref 7–23)
C PNEUM DNA SPEC QL NAA+PROBE: SIGNIFICANT CHANGE UP
CALCIUM SERPL-MCNC: 9.4 MG/DL — SIGNIFICANT CHANGE UP (ref 8.4–10.5)
CHLORIDE SERPL-SCNC: 109 MMOL/L — HIGH (ref 98–107)
CK MB BLD-MCNC: 2.1 % — SIGNIFICANT CHANGE UP (ref 0–2.5)
CK MB CFR SERPL CALC: 20.8 NG/ML — HIGH
CK SERPL-CCNC: 1006 U/L — HIGH (ref 25–170)
CO2 SERPL-SCNC: 14 MMOL/L — LOW (ref 22–31)
COLOR SPEC: YELLOW — SIGNIFICANT CHANGE UP
CREAT SERPL-MCNC: 0.61 MG/DL — SIGNIFICANT CHANGE UP (ref 0.5–1.3)
DIFF PNL FLD: ABNORMAL
EGFR: 84 ML/MIN/1.73M2 — SIGNIFICANT CHANGE UP
EOSINOPHIL # BLD AUTO: 0 K/UL — SIGNIFICANT CHANGE UP (ref 0–0.5)
EOSINOPHIL NFR BLD AUTO: 0 % — SIGNIFICANT CHANGE UP (ref 0–6)
EPI CELLS # UR: 4 /HPF — SIGNIFICANT CHANGE UP (ref 0–5)
FLUAV SUBTYP SPEC NAA+PROBE: SIGNIFICANT CHANGE UP
FLUBV RNA SPEC QL NAA+PROBE: SIGNIFICANT CHANGE UP
GAS PNL BLDV: SIGNIFICANT CHANGE UP
GLUCOSE SERPL-MCNC: 112 MG/DL — HIGH (ref 70–99)
GLUCOSE UR QL: NEGATIVE — SIGNIFICANT CHANGE UP
HADV DNA SPEC QL NAA+PROBE: SIGNIFICANT CHANGE UP
HCOV 229E RNA SPEC QL NAA+PROBE: SIGNIFICANT CHANGE UP
HCOV HKU1 RNA SPEC QL NAA+PROBE: SIGNIFICANT CHANGE UP
HCOV NL63 RNA SPEC QL NAA+PROBE: SIGNIFICANT CHANGE UP
HCOV OC43 RNA SPEC QL NAA+PROBE: SIGNIFICANT CHANGE UP
HCT VFR BLD CALC: 46.4 % — HIGH (ref 34.5–45)
HGB BLD-MCNC: 14.6 G/DL — SIGNIFICANT CHANGE UP (ref 11.5–15.5)
HMPV RNA SPEC QL NAA+PROBE: SIGNIFICANT CHANGE UP
HPIV1 RNA SPEC QL NAA+PROBE: SIGNIFICANT CHANGE UP
HPIV2 RNA SPEC QL NAA+PROBE: SIGNIFICANT CHANGE UP
HPIV3 RNA SPEC QL NAA+PROBE: SIGNIFICANT CHANGE UP
HPIV4 RNA SPEC QL NAA+PROBE: SIGNIFICANT CHANGE UP
IANC: 12.67 K/UL — HIGH (ref 1.8–7.4)
IMM GRANULOCYTES NFR BLD AUTO: 0.8 % — SIGNIFICANT CHANGE UP (ref 0–1.5)
INR BLD: 1.01 RATIO — SIGNIFICANT CHANGE UP (ref 0.88–1.16)
KETONES UR-MCNC: ABNORMAL
LEUKOCYTE ESTERASE UR-ACNC: NEGATIVE — SIGNIFICANT CHANGE UP
LIDOCAIN IGE QN: 16 U/L — SIGNIFICANT CHANGE UP (ref 7–60)
LYMPHOCYTES # BLD AUTO: 0.52 K/UL — LOW (ref 1–3.3)
LYMPHOCYTES # BLD AUTO: 3.4 % — LOW (ref 13–44)
M PNEUMO DNA SPEC QL NAA+PROBE: SIGNIFICANT CHANGE UP
MCHC RBC-ENTMCNC: 29.7 PG — SIGNIFICANT CHANGE UP (ref 27–34)
MCHC RBC-ENTMCNC: 31.5 GM/DL — LOW (ref 32–36)
MCV RBC AUTO: 94.5 FL — SIGNIFICANT CHANGE UP (ref 80–100)
MONOCYTES # BLD AUTO: 1.97 K/UL — HIGH (ref 0–0.9)
MONOCYTES NFR BLD AUTO: 12.9 % — SIGNIFICANT CHANGE UP (ref 2–14)
NEUTROPHILS # BLD AUTO: 12.67 K/UL — HIGH (ref 1.8–7.4)
NEUTROPHILS NFR BLD AUTO: 82.8 % — HIGH (ref 43–77)
NITRITE UR-MCNC: NEGATIVE — SIGNIFICANT CHANGE UP
NRBC # BLD: 0 /100 WBCS — SIGNIFICANT CHANGE UP
NRBC # FLD: 0 K/UL — SIGNIFICANT CHANGE UP
NT-PROBNP SERPL-SCNC: HIGH PG/ML
PH UR: 6 — SIGNIFICANT CHANGE UP (ref 5–8)
PLATELET # BLD AUTO: 152 K/UL — SIGNIFICANT CHANGE UP (ref 150–400)
POTASSIUM SERPL-MCNC: 3.4 MMOL/L — LOW (ref 3.5–5.3)
POTASSIUM SERPL-SCNC: 3.4 MMOL/L — LOW (ref 3.5–5.3)
PROT SERPL-MCNC: 6.9 G/DL — SIGNIFICANT CHANGE UP (ref 6–8.3)
PROT UR-MCNC: ABNORMAL
PROTHROM AB SERPL-ACNC: 11.7 SEC — SIGNIFICANT CHANGE UP (ref 10.5–13.4)
RAPID RVP RESULT: SIGNIFICANT CHANGE UP
RBC # BLD: 4.91 M/UL — SIGNIFICANT CHANGE UP (ref 3.8–5.2)
RBC # FLD: 17 % — HIGH (ref 10.3–14.5)
RBC CASTS # UR COMP ASSIST: 7 /HPF — HIGH (ref 0–4)
RSV RNA SPEC QL NAA+PROBE: SIGNIFICANT CHANGE UP
RV+EV RNA SPEC QL NAA+PROBE: SIGNIFICANT CHANGE UP
SARS-COV-2 RNA SPEC QL NAA+PROBE: SIGNIFICANT CHANGE UP
SODIUM SERPL-SCNC: 145 MMOL/L — SIGNIFICANT CHANGE UP (ref 135–145)
SP GR SPEC: 1.02 — SIGNIFICANT CHANGE UP (ref 1–1.05)
TROPONIN T, HIGH SENSITIVITY RESULT: 36 NG/L — SIGNIFICANT CHANGE UP
UROBILINOGEN FLD QL: SIGNIFICANT CHANGE UP
WBC # BLD: 15.31 K/UL — HIGH (ref 3.8–10.5)
WBC # FLD AUTO: 15.31 K/UL — HIGH (ref 3.8–10.5)

## 2022-05-13 PROCEDURE — 99291 CRITICAL CARE FIRST HOUR: CPT

## 2022-05-13 PROCEDURE — 71045 X-RAY EXAM CHEST 1 VIEW: CPT | Mod: 26

## 2022-05-13 PROCEDURE — 99291 CRITICAL CARE FIRST HOUR: CPT | Mod: FT,25,GC

## 2022-05-13 PROCEDURE — 93280 PM DEVICE PROGR EVAL DUAL: CPT | Mod: 26

## 2022-05-13 PROCEDURE — 72170 X-RAY EXAM OF PELVIS: CPT | Mod: 26

## 2022-05-13 PROCEDURE — 31500 INSERT EMERGENCY AIRWAY: CPT | Mod: 59,GC

## 2022-05-13 PROCEDURE — 70450 CT HEAD/BRAIN W/O DYE: CPT | Mod: 26,MA

## 2022-05-13 PROCEDURE — 74177 CT ABD & PELVIS W/CONTRAST: CPT | Mod: 26,MA

## 2022-05-13 PROCEDURE — 72125 CT NECK SPINE W/O DYE: CPT | Mod: 26,MA

## 2022-05-13 PROCEDURE — 99222 1ST HOSP IP/OBS MODERATE 55: CPT

## 2022-05-13 PROCEDURE — 93010 ELECTROCARDIOGRAM REPORT: CPT | Mod: 59,GC

## 2022-05-13 PROCEDURE — 71260 CT THORAX DX C+: CPT | Mod: 26,MA

## 2022-05-13 PROCEDURE — 70486 CT MAXILLOFACIAL W/O DYE: CPT | Mod: 26,MA

## 2022-05-13 RX ORDER — PIPERACILLIN AND TAZOBACTAM 4; .5 G/20ML; G/20ML
3.38 INJECTION, POWDER, LYOPHILIZED, FOR SOLUTION INTRAVENOUS ONCE
Refills: 0 | Status: COMPLETED | OUTPATIENT
Start: 2022-05-13 | End: 2022-05-13

## 2022-05-13 RX ORDER — POTASSIUM CHLORIDE 20 MEQ
10 PACKET (EA) ORAL
Refills: 0 | Status: COMPLETED | OUTPATIENT
Start: 2022-05-13 | End: 2022-05-13

## 2022-05-13 RX ORDER — VANCOMYCIN HCL 1 G
1000 VIAL (EA) INTRAVENOUS ONCE
Refills: 0 | Status: COMPLETED | OUTPATIENT
Start: 2022-05-13 | End: 2022-05-13

## 2022-05-13 RX ORDER — NOREPINEPHRINE BITARTRATE/D5W 8 MG/250ML
0.05 PLASTIC BAG, INJECTION (ML) INTRAVENOUS
Qty: 8 | Refills: 0 | Status: DISCONTINUED | OUTPATIENT
Start: 2022-05-13 | End: 2022-05-14

## 2022-05-13 RX ORDER — ETOMIDATE 2 MG/ML
20 INJECTION INTRAVENOUS ONCE
Refills: 0 | Status: COMPLETED | OUTPATIENT
Start: 2022-05-13 | End: 2022-05-13

## 2022-05-13 RX ORDER — AMIODARONE HYDROCHLORIDE 400 MG/1
1 TABLET ORAL
Qty: 450 | Refills: 0 | Status: DISCONTINUED | OUTPATIENT
Start: 2022-05-13 | End: 2022-05-15

## 2022-05-13 RX ORDER — ROCURONIUM BROMIDE 10 MG/ML
100 VIAL (ML) INTRAVENOUS ONCE
Refills: 0 | Status: COMPLETED | OUTPATIENT
Start: 2022-05-13 | End: 2022-05-13

## 2022-05-13 RX ORDER — ASPIRIN/CALCIUM CARB/MAGNESIUM 324 MG
300 TABLET ORAL ONCE
Refills: 0 | Status: DISCONTINUED | OUTPATIENT
Start: 2022-05-13 | End: 2022-05-13

## 2022-05-13 RX ORDER — CHLORHEXIDINE GLUCONATE 213 G/1000ML
1 SOLUTION TOPICAL
Refills: 0 | Status: DISCONTINUED | OUTPATIENT
Start: 2022-05-13 | End: 2022-05-17

## 2022-05-13 RX ORDER — AMIODARONE HYDROCHLORIDE 400 MG/1
150 TABLET ORAL ONCE
Refills: 0 | Status: COMPLETED | OUTPATIENT
Start: 2022-05-13 | End: 2022-05-13

## 2022-05-13 RX ORDER — PROPOFOL 10 MG/ML
10 INJECTION, EMULSION INTRAVENOUS
Qty: 500 | Refills: 0 | Status: DISCONTINUED | OUTPATIENT
Start: 2022-05-13 | End: 2022-05-13

## 2022-05-13 RX ORDER — MAGNESIUM SULFATE 500 MG/ML
2 VIAL (ML) INJECTION ONCE
Refills: 0 | Status: COMPLETED | OUTPATIENT
Start: 2022-05-13 | End: 2022-05-13

## 2022-05-13 RX ORDER — PROPOFOL 10 MG/ML
10 INJECTION, EMULSION INTRAVENOUS
Qty: 1000 | Refills: 0 | Status: DISCONTINUED | OUTPATIENT
Start: 2022-05-13 | End: 2022-05-15

## 2022-05-13 RX ORDER — PIPERACILLIN AND TAZOBACTAM 4; .5 G/20ML; G/20ML
3.38 INJECTION, POWDER, LYOPHILIZED, FOR SOLUTION INTRAVENOUS EVERY 8 HOURS
Refills: 0 | Status: COMPLETED | OUTPATIENT
Start: 2022-05-13 | End: 2022-05-17

## 2022-05-13 RX ORDER — AMIODARONE HYDROCHLORIDE 400 MG/1
0.5 TABLET ORAL
Qty: 450 | Refills: 0 | Status: DISCONTINUED | OUTPATIENT
Start: 2022-05-14 | End: 2022-05-15

## 2022-05-13 RX ORDER — CHLORHEXIDINE GLUCONATE 213 G/1000ML
15 SOLUTION TOPICAL EVERY 12 HOURS
Refills: 0 | Status: DISCONTINUED | OUTPATIENT
Start: 2022-05-13 | End: 2022-05-16

## 2022-05-13 RX ORDER — SODIUM CHLORIDE 9 MG/ML
1000 INJECTION, SOLUTION INTRAVENOUS
Refills: 0 | Status: DISCONTINUED | OUTPATIENT
Start: 2022-05-13 | End: 2022-05-14

## 2022-05-13 RX ADMIN — CHLORHEXIDINE GLUCONATE 15 MILLILITER(S): 213 SOLUTION TOPICAL at 19:58

## 2022-05-13 RX ADMIN — Medication 100 MILLIEQUIVALENT(S): at 22:27

## 2022-05-13 RX ADMIN — ETOMIDATE 20 MILLIGRAM(S): 2 INJECTION INTRAVENOUS at 14:50

## 2022-05-13 RX ADMIN — Medication 100 MILLIEQUIVALENT(S): at 19:48

## 2022-05-13 RX ADMIN — PROPOFOL 4.2 MICROGRAM(S)/KG/MIN: 10 INJECTION, EMULSION INTRAVENOUS at 19:14

## 2022-05-13 RX ADMIN — Medication 100 MILLIEQUIVALENT(S): at 21:27

## 2022-05-13 RX ADMIN — AMIODARONE HYDROCHLORIDE 33.3 MG/MIN: 400 TABLET ORAL at 19:14

## 2022-05-13 RX ADMIN — AMIODARONE HYDROCHLORIDE 618 MILLIGRAM(S): 400 TABLET ORAL at 16:25

## 2022-05-13 RX ADMIN — SODIUM CHLORIDE 100 MILLILITER(S): 9 INJECTION, SOLUTION INTRAVENOUS at 19:14

## 2022-05-13 RX ADMIN — PIPERACILLIN AND TAZOBACTAM 200 GRAM(S): 4; .5 INJECTION, POWDER, LYOPHILIZED, FOR SOLUTION INTRAVENOUS at 17:35

## 2022-05-13 RX ADMIN — PIPERACILLIN AND TAZOBACTAM 25 GRAM(S): 4; .5 INJECTION, POWDER, LYOPHILIZED, FOR SOLUTION INTRAVENOUS at 21:14

## 2022-05-13 RX ADMIN — Medication 250 MILLIGRAM(S): at 17:36

## 2022-05-13 RX ADMIN — Medication 100 MILLIGRAM(S): at 14:50

## 2022-05-13 RX ADMIN — PROPOFOL 4.2 MICROGRAM(S)/KG/MIN: 10 INJECTION, EMULSION INTRAVENOUS at 16:25

## 2022-05-13 RX ADMIN — AMIODARONE HYDROCHLORIDE 33.3 MG/MIN: 400 TABLET ORAL at 16:25

## 2022-05-13 RX ADMIN — Medication 25 GRAM(S): at 17:41

## 2022-05-13 NOTE — PROCEDURE NOTE - ADDITIONAL PROCEDURE DETAILS
Dual Chamber pacemaker in AAIR <=> DDDR mode   Excellent threshold capture  Reprogramming to VVI at 60bpm with DR. Maria due to Atrial undersensing.

## 2022-05-13 NOTE — H&P ADULT - HISTORY OF PRESENT ILLNESS
Patient is a 89 y/o F w/ PMHx HTN, HLD, Afib on Xarelto, CAD s/p CABG presenting to the ED after being found unresponsive at home. On Tuesday night, her neighbors went to check on her and found her lying on the kitchen floor with swelling to the right side of the face. At the time, she was not responsive. Patient was brought in by EMS and was intubated and sedated in the ED. At this time, she responded to painful stimuli only. ED Vitals: BP __ HR  __ RR  __ __ on ___. CT head showed large subacute left MCA territory infarct without midline shift or mass effect. Patient lives at home with _____.        Patient is a 89 y/o F w/ PMHx HTN, HLD, Afib on Xarelto, CAD s/p CABG presenting to the ED after being found unresponsive at home. On Tuesday night, her neighbors went to check on her and found her lying on the kitchen floor with swelling to the right side of the face. At the time, she was not responsive. Patient was brought in by EMS and was intubated and sedated in the ED. At this time, she responded to painful stimuli only. ED Vitals: /82, , RR  26, 91% on BVM. CT head showed large subacute left MCA territory infarct without midline shift or mass effect. Patient lives at home alone and completed all IADL and ADL prior to event. History limited due to presentation.

## 2022-05-13 NOTE — ED PROVIDER NOTE - ATTENDING CONTRIBUTION TO CARE
I performed a face-to-face evaluation of the patient and performed a history and physical examination. I agree with the history and physical examination. If this was a PA visit, I personally saw the patient with the PA and performed a substantive portion of the visit including all aspects of the medical decision making.    Older woman with pacemaker, on Xarelto, found down after she had been seen for two days. Has edema to the right side of her face. Responding only to withdrawal from pain in the extremities. Concern for head bleed or metabolic derangement. Vital signs unremarkable. Performed endotracheal intubation. Will check brain CT, EKG, troponin, CBC, CMP.    I have personally provided the amount of critical care time documented below, excluding time spent on separate procedures. I spoke with several family member(s). I performed a face-to-face evaluation of the patient and performed a history and physical examination. I agree with the history and physical examination. If this was a PA visit, I personally saw the patient with the PA and performed a substantive portion of the visit including all aspects of the medical decision making.    Older woman with pacemaker, on Xarelto, found down after she had been seen for two days. Has edema to the right side of her face. Responding only to withdrawal from pain in the extremities. Concern for head bleed or metabolic derangement. Vital signs unremarkable. Performed endotracheal intubation. Will check brain CT, EKG, troponin, CBC, CMP.    I have personally provided the amount of critical care time documented below, excluding time spent on separate procedures. I spoke with several family member(s). I spoke with the consulting service or read their note/recommendations.

## 2022-05-13 NOTE — ED PROVIDER NOTE - CLINICAL SUMMARY MEDICAL DECISION MAKING FREE TEXT BOX
Leighann: Older woman with pacemaker, on Xarelto, found down after she had been seen for two days. Has edema to the right side of her face. Responding only to withdrawal from pain in the extremities. Concern for head bleed or metabolic derangement. Vital signs unremarkable. Performed endotracheal intubation. Will check brain CT, EKG, troponin, CBC, CMP.

## 2022-05-13 NOTE — H&P ADULT - NSICDXFAMILYHX_GEN_ALL_CORE_FT
FAMILY HISTORY:  No pertinent family history in first degree relatives     Partial Purse String (Simple) Text: Given the location of the defect and the characteristics of the surrounding skin a simple purse string closure was deemed most appropriate.  Undermining was performed circumferentially around the surgical defect.  A purse string suture was then placed and tightened. Wound tension of the circular defect prevented complete closure of the wound.

## 2022-05-13 NOTE — ED ADULT NURSE REASSESSMENT NOTE - NS ED NURSE REASSESS COMMENT FT1
Mobile Critical Care RN: patient now following commands on left side, able to squeeze hands and wiggle toes. propofol gtt reinitiated at 10mcg/kg/min

## 2022-05-13 NOTE — ED PROVIDER NOTE - PHYSICAL EXAMINATION
Ill-appearing, well nourished, obtunded    Airway patent, shallow breaths    Eyes without scleral injection. No jaundice.    Strong pulse.    Respirations unlabored.    Abdomen soft, non-tender, no guarding.    Spine appears normal, range of motion is not limited, no muscle or joint tenderness.    Neuro: obtunded, withdraws to painful stimuli.    Skin: edema to R side of face, ecchymoses to (B) legs.    Psych: unable to assess Ill-appearing, well nourished, obtunded    Airway patent, shallow breaths    Eyes without scleral injection. No jaundice.    Strong pulse.    Respirations unlabored.    Abdomen soft, non-tender, no guarding.    Spine appears normal, range of motion is not limited, no muscle or joint tenderness.    Neuro: obtunded, withdraws to painful stimuli.    Skin: edema to R side of face, ecchymoses to (B) arms.    Psych: unable to assess

## 2022-05-13 NOTE — CONSULT NOTE ADULT - SUBJECTIVE AND OBJECTIVE BOX
INCOMPLETE  Neurology  Consult Note  05-13-22    Name:  DAJUAN CRANE; 89y (29-May-1930)    Reason for Admission: CVA    HPI: 89yo R-handed woman with PMH of afib (reportedly on Xarelto), CABG, HTN, and HLD presents to the ED after being found down and unresponsive. History obtained from son and grandson at bedside. LKN 2 days ago, reportedly 5/11/22 in the evening when they spoke to her over the phone. At baseline she is very active and does all ADLs independently. Neighbors were concerned that they could not reach her and she was found down in her home, unresponsive, covered in urine and feces. Patient was reportedly doing well and at baseline earlier in the week, and is adherent with medications. NIHSS 30. Pre-MRS 0. CTH demonstrates a large MCA territory infarct. Patient is not a candidate for tPA or thrombectomy since she is out of window.    Review of Systems:  Unable to obtain due to patient's medical condition and mental status    Allergies:  sulfa drugs (Unknown)      PMHx:   No pertinent past medical history    S/P CABG x 1    Atrial fibrillation    Hypertension    Hyperlipemia      PFHx:   No pertinent family history in first degree relatives      PSuHx:   No significant past surgical history        Medications:  MEDICATIONS  (STANDING):  aMIOdarone Infusion 1 mG/Min (33.3 mL/Hr) IV Continuous <Continuous>  aspirin Suppository 300 milliGRAM(s) Rectal Once  chlorhexidine 0.12% Liquid 15 milliLiter(s) Oral Mucosa every 12 hours  etomidate Injectable 20 milliGRAM(s) IV Push once  magnesium sulfate  IVPB 2 Gram(s) IV Intermittent Once  piperacillin/tazobactam IVPB... 3.375 Gram(s) IV Intermittent once  propofol Infusion 10 MICROgram(s)/kG/Min (4.2 mL/Hr) IV Continuous <Continuous>  rocuronium Injectable 100 milliGRAM(s) IV Push Once  vancomycin  IVPB. 1000 milliGRAM(s) IV Intermittent once    Physical Examination: INCOMPLETE  General: Intubated and sedated  HEENT: NCAT  Neurologic:  - Mental Status: Obtunded. Does not respond to voice or painful stimuli.  - Cranial Nerves: Visual fields are full to confrontation; Pupils are equal, round, and reactive to light; Visual acuity is 20/20 bilaterally; Fundoscopic exam is normal with sharp discs. Extraocular movements are intact without nystagmus. Facial sensation is intact in the V1-V3 distribution bilaterally. Face is symmetric with normal eye closure and smile. Hearing is intact to finger rub. Uvula is midline and soft palate rises symmetrically. Head turning and shoulder shrug are intact.Tongue protrudes in the midline.  - Motor: Strength is 5/5 throughout. There is no pronator drift.  - Reflexes: 2+ and symmetric at the biceps, triceps, brachioradialis, knees, and ankles. Plantar responses down bilaterally.  - Sensory: Intact throughout to light touch, pin prick, vibration, and joint-position.  - Coordination: Finger-nose-finger and heel-knee-shin intact without dysmetria. Rapid alternating hand movements intact.  - Gait: Normal steps, base, arm swing, and turning. Heel and toe walking are normal. Tandem gait is normal. Romberg testing is negative.    Labs:                        14.6   15.31 )-----------( 152      ( 13 May 2022 15:33 )             46.4     05-13    145  |  109<H>  |  24<H>  ----------------------------<  112<H>  3.4<L>   |  14<L>  |  0.61    Ca    9.4      13 May 2022 15:33  Phos  2.9     05-13  Mg     2.00     05-13    TPro  6.9  /  Alb  3.9  /  TBili  1.0  /  DBili  x   /  AST  42<H>  /  ALT  24  /  AlkPhos  77  05-13    CAPILLARY BLOOD GLUCOSE        LIVER FUNCTIONS - ( 13 May 2022 15:33 )  Alb: 3.9 g/dL / Pro: 6.9 g/dL / ALK PHOS: 77 U/L / ALT: 24 U/L / AST: 42 U/L / GGT: x             PT/INR - ( 13 May 2022 15:33 )   PT: 11.7 sec;   INR: 1.01 ratio         PTT - ( 13 May 2022 15:33 )  PTT:23.7 sec  CSF:                  Radiology:  CT Head No Cont:  (13 May 2022 16:15)     Neurology  Consult Note  05-13-22    Name:  DAJUAN CRANE; 89y (29-May-1930)    Reason for Admission: CVA    HPI: 89yo R-handed woman with PMH of afib (reportedly on Xarelto), CABG, HTN, and HLD presents to the ED after being found down and unresponsive. History obtained from son and grandson at bedside. LKN 2 days ago, reportedly 5/11/22 in the evening when they spoke to her over the phone. At baseline she is very active and does all ADLs independently. Neighbors were concerned that they could not reach her and she was found down in her home, unresponsive, covered in urine and feces. Patient was reportedly doing well and at baseline earlier in the week, and is adherent with medications. Initial NIHSS 30, however patient was sedated on propofol and on reassessment off propofol it was 27. Pre-MRS 0. CTH demonstrates a large MCA territory infarct. Patient is not a candidate for tPA or thrombectomy since she is out of window with evidence of large CVA on CT.    Review of Systems:  Unable to obtain due to patient's medical condition and mental status    Allergies:  sulfa drugs (Unknown)      PMHx:   No pertinent past medical history    S/P CABG x 1    Atrial fibrillation    Hypertension    Hyperlipemia      PFHx:   No pertinent family history in first degree relatives      PSuHx:   No significant past surgical history        Medications:  MEDICATIONS  (STANDING):  aMIOdarone Infusion 1 mG/Min (33.3 mL/Hr) IV Continuous <Continuous>  chlorhexidine 0.12% Liquid 15 milliLiter(s) Oral Mucosa every 12 hours  propofol Infusion 10 MICROgram(s)/kG/Min (4.2 mL/Hr) IV Continuous <Continuous>    Physical Examination:  General: Intubated and sedated  HEENT: NCAT  Neurologic:  - Mental Status: Obtunded. Does not respond to voice or painful stimuli.  - Cranial Nerves: Visual fields are full to confrontation; Pupils are slightly asymmetric with OD slightly larger than OS, but reactive to light. Eyes in primary gaze with mild exotropia. Unable to elicit corneal, oculocephalic, or gag reflex. Difficult to adequately assess facial symmetry, however appears to have R lower facial palsy.  - Motor: Non-purposeful arrhythmic movements in the LUE and LLE. No spontaneous movement in the RUE and spontaneous dorsal flexion in the RLE. Flaccid tone throughout. No tremors noted.  - Reflexes: 2+ and symmetric at the biceps, triceps, brachioradialis, knees, and ankles. Plantar responses down bilaterally.  - Sensory: Withdrawal reflex to noxious stimuli in the L extremities  - Coordination: Patient is unable to complete task  - Gait: Patient is unable to complete task    Labs:                        14.6   15.31 )-----------( 152      ( 13 May 2022 15:33 )             46.4     05-13    145  |  109<H>  |  24<H>  ----------------------------<  112<H>  3.4<L>   |  14<L>  |  0.61    Ca    9.4      13 May 2022 15:33  Phos  2.9     05-13  Mg     2.00     05-13    TPro  6.9  /  Alb  3.9  /  TBili  1.0  /  DBili  x   /  AST  42<H>  /  ALT  24  /  AlkPhos  77  05-13    CAPILLARY BLOOD GLUCOSE        LIVER FUNCTIONS - ( 13 May 2022 15:33 )  Alb: 3.9 g/dL / Pro: 6.9 g/dL / ALK PHOS: 77 U/L / ALT: 24 U/L / AST: 42 U/L / GGT: x             PT/INR - ( 13 May 2022 15:33 )   PT: 11.7 sec;   INR: 1.01 ratio    PTT - ( 13 May 2022 15:33 )  PTT:23.7 sec          Radiology:  CT Head No Cont:  (13 May 2022 16:15)  IMPRESSION:  Head CT: Mild right frontal scalp swelling. No acute intracranial   hemorrhage, vasogenic edema, extra-axial collection or calvarial   fracture. Large subacute left MCA territory infarct without significant   mass effect or midline shift.    Maxillofacial CT: No evidence of acute fracture or dislocation.  Mucosal thickening with foamy secretions in thebilateral sphenoid   sinuses. Fluid layering in the nasopharynx.    Cervical spine CT: No evidence of acute fracture or traumatic   malalignment. Mild degenerative changes.    Nonspecific groundglass and nodular opacities in the right upper lobe.   Consider follow-up chest CT for further evaluation.   Neurology  Consult Note  05-13-22    Name:  DAJUAN CRANE; 89y (29-May-1930)    Reason for Admission: CVA    HPI: 89yo R-handed woman with PMH of afib (reportedly on Xarelto), CABG, HTN, and HLD presents to the ED after being found down and unresponsive. History obtained from son and grandson at bedside. LKN 2 days ago, reportedly 5/11/22 in the evening when they spoke to her over the phone. At baseline she is very active and does all ADLs independently. Neighbors were concerned that they could not reach her and she was found down in her home, unresponsive, covered in urine and feces. Patient was reportedly doing well and at baseline earlier in the week, and is adherent with medications. Initial NIHSS 30, however patient was sedated on propofol and on reassessment off propofol it was 27. Pre-MRS 0. CTH demonstrates a large MCA territory infarct. Patient is not a candidate for tPA or thrombectomy since she is out of window with evidence of large CVA on CT.    Review of Systems:  Unable to obtain due to patient's medical condition and mental status    Allergies:  sulfa drugs (Unknown)      PMHx:   No pertinent past medical history    S/P CABG x 1    Atrial fibrillation    Hypertension    Hyperlipemia      PFHx:   No pertinent family history in first degree relatives      PSuHx:   No significant past surgical history        Medications:  MEDICATIONS  (STANDING):  aMIOdarone Infusion 1 mG/Min (33.3 mL/Hr) IV Continuous <Continuous>  chlorhexidine 0.12% Liquid 15 milliLiter(s) Oral Mucosa every 12 hours  propofol Infusion 10 MICROgram(s)/kG/Min (4.2 mL/Hr) IV Continuous <Continuous>    Physical Examination:  General: Intubated and sedated  HEENT: NCAT  Neurologic:  - Mental Status: Obtunded. Does not respond to voice or painful stimuli.  - Cranial Nerves: Visual fields - no BTT,B; Pupils are slightly asymmetric with OD slightly larger than OS, but reactive to light. Eyes in primary gaze with mild exotropia. Unable to elicit corneal, oculocephalic, or gag reflex. Difficult to adequately assess facial symmetry, however appears to have R lower facial palsy.  - Motor: Non-purposeful arrhythmic movements in the LUE and LLE. No spontaneous movement in the RUE and spontaneous dorsal flexion in the RLE. Flaccid tone throughout. No tremors noted.  - Reflexes: 2+ and symmetric at the biceps, triceps, brachioradialis, knees, and ankles. Plantar responses down bilaterally.  - Sensory: Withdrawal reflex to noxious stimuli in the L extremities  - Coordination: Patient is unable to complete task  - Gait: Patient is unable to complete task    Labs:                        14.6   15.31 )-----------( 152      ( 13 May 2022 15:33 )             46.4     05-13    145  |  109<H>  |  24<H>  ----------------------------<  112<H>  3.4<L>   |  14<L>  |  0.61    Ca    9.4      13 May 2022 15:33  Phos  2.9     05-13  Mg     2.00     05-13    TPro  6.9  /  Alb  3.9  /  TBili  1.0  /  DBili  x   /  AST  42<H>  /  ALT  24  /  AlkPhos  77  05-13    CAPILLARY BLOOD GLUCOSE        LIVER FUNCTIONS - ( 13 May 2022 15:33 )  Alb: 3.9 g/dL / Pro: 6.9 g/dL / ALK PHOS: 77 U/L / ALT: 24 U/L / AST: 42 U/L / GGT: x             PT/INR - ( 13 May 2022 15:33 )   PT: 11.7 sec;   INR: 1.01 ratio    PTT - ( 13 May 2022 15:33 )  PTT:23.7 sec          Radiology:  CT Head No Cont:  (13 May 2022 16:15)  IMPRESSION:  Head CT: Mild right frontal scalp swelling. No acute intracranial   hemorrhage, vasogenic edema, extra-axial collection or calvarial   fracture. Large subacute left MCA territory infarct without significant   mass effect or midline shift.    Maxillofacial CT: No evidence of acute fracture or dislocation.  Mucosal thickening with foamy secretions in thebilateral sphenoid   sinuses. Fluid layering in the nasopharynx.    Cervical spine CT: No evidence of acute fracture or traumatic   malalignment. Mild degenerative changes.    Nonspecific groundglass and nodular opacities in the right upper lobe.   Consider follow-up chest CT for further evaluation.

## 2022-05-13 NOTE — ED PROCEDURE NOTE - ATTENDING CONTRIBUTION TO CARE
I performed a face-to-face evaluation of the patient and performed a history and physical examination. I agree with the history and physical examination. If this was a PA visit, I personally saw the patient with the PA and performed a substantive portion of the visit including all aspects of the medical decision making.     Leighann: I was present during the critical part of the procedure.

## 2022-05-13 NOTE — H&P ADULT - ATTENDING COMMENTS
89 year old female with AF on Xarelto, CAD s/p CABG presents after being found unresponsive at home for unclear amount of time. CT head shows subacute L MCA infarct. Patient intubated for acute respiratory failure. Had sustained VT started on amiodarone in ED. Will transfer to ICU. Continue ventilatory support, amiodarone. Supportive care. Follow up EP and neurology recommendations. Prognosis guarded.

## 2022-05-13 NOTE — CONSULT NOTE ADULT - ASSESSMENT
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 CT head showed large subacute left MCA territory infarct without midline shift or mass effect, neurology consulted and deemed no tPA or thrombectomy intervention. Patient was found in rapid Afib and SaO2 91% on BVM. She was intubated and sedated on propofol in ED. Episode of sustained VT with VR to 209 bpm and amiodarone IV load started. Currently on Afib with rate controlled. EP is called for interrogation and consulted for rapid /VT. Due to presentation, collateral obtained from family. Per family, patient lives at home alone and completed all ADL prior to event.       Rapid Afib and VT     RECOMMENDATIONS:  - Continuous telemetric monitoring for tachyarrhythmia  - Continue Amiodarone IV load   - Recommend Lopressor 5mg IV q4h PRN if HR >110bpm if BP tolerates  - Monitor electrolytes and replete K to 4 and Mg to 2  - Recommend AC (pt. is taking Xarelto at home) for thromboembolic ppx  given that patient has a [id number] score of 6 if no clinical contraindication and cleared by Neuro  - Continue care per primary team/MICU    Patient to be staffed with attending. Please await attending addendum   Patient is a 89y old Female with a PMH of HTN, HLD, Afib ( reportedly on Xarelto), Med[employer]onic dural chamber PPM 2/2 SND with AT/AF, CAD s/p CABG, BIBEM to the ED after being found down and unresponsive at home. CT head showed large subacute left MCA territory infarct without midline shift or mass effect, neurology consulted and deemed no tPA or thrombectomy intervention. Patient was found in rapid Afib and SaO2 91% on BVM. She was intubated and sedated on propofol in ED. Episode of sustained VT with VR to 209 bpm noted and amiodarone IV load started. Currently Afib with rate controlled. EP is called for interrogation and consulted for rapid Afib /VT. Due to presentation, collateral obtained from family. Per family, patient lives at home alone and completed all ADL prior to event.       Rapid Afib and VT     RECOMMENDATIONS:  - Continuous telemetric monitoring for tachyarrhythmia  - Continue Amiodarone IV load   - Recommend Lopressor 5mg IV q4h PRN if HR >110bpm if BP tolerates  - Monitor electrolytes and replete K to 4 and Mg to 2  - Recommend AC (pt. is taking Xarelto at home) for thromboembolic ppx  given that patient has a [id number] score of 6 if no clinical contraindication and cleared by Neuro  - Continue care per primary team/MICU    Patient to be staffed with attending. Please await attending addendum   Patient is a 89y old Female with a PMH of HTN, HLD, Afib ( reportedly on Xarelto), Medtronic dural chamber PPM 2/2 SND with AT/AF, CAD s/p CABG, BIBEM to the ED after being found down and unresponsive at home. CT head showed large subacute left MCA territory infarct without midline shift or mass effect, neurology consulted and deemed no tPA or thrombectomy intervention. Patient was found in rapid Afib and SaO2 91% on BVM. She was intubated and sedated on propofol in ED. Episode of sustained VT with VR to 209 bpm noted and amiodarone IV load started. Currently Afib with rate controlled. EP is called for interrogation and consulted for rapid Afib /VT. Due to presentation, collateral obtained from family. Per family, patient lives at home alone and completed all ADL prior to event.       Rapid Afib and VT     RECOMMENDATIONS:  - Continuous telemetric monitoring for tachyarrhythmia  - Continue Amiodarone IV load   - Recommend Lopressor 5mg IV q4h PRN if HR >110bpm if BP tolerates  - Obtain Echo to assess LV/RV function  - Monitor electrolytes and replete K to 4 and Mg to 2  - Recommend AC (pt. is taking Xarelto at home) for thromboembolic ppx  given that patient has a [id number] score of 6 if no clinical contraindication and cleared by Neuro  - Continue care per primary team/MICU    Patient to be staffed with attending. Please await attending addendum

## 2022-05-13 NOTE — CONSULT NOTE ADULT - SUBJECTIVE AND OBJECTIVE BOX
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 CT head showed large subacute left MCA territory infarct without midline shift or mass effect, neurology consulted and deemed no tPA or thrombectomy intervention. Patient was found in rapid Afib and SaO2 91% on BVM. She was intubated and sedated on propofol in ED. Episode of sustained VT with VR to 209 bpm and amiodarone IV load started. Currently on Afib with rate controlled. EP is called for interrogation and consulted for rapid /VT. Due to presentation, collateral obtained from family. Per family, patient lives at home alone and completed all ADL prior to event.   In ED: WBC; 15.31, K3.4, Lactate 4.2, Anion gap 22, Ph 7.30        PAST MEDICAL & SURGICAL HISTORY:  S/P CABG x 1      Atrial fibrillation      Hypertension      Hyperlipemia      No significant past surgical history            MEDICATIONS  (STANDING):  aMIOdarone Infusion 1 mG/Min (33.3 mL/Hr) IV Continuous <Continuous>  chlorhexidine 0.12% Liquid 15 milliLiter(s) Oral Mucosa every 12 hours  chlorhexidine 4% Liquid 1 Application(s) Topical <User Schedule>  lactated ringers. 1000 milliLiter(s) (100 mL/Hr) IV Continuous <Continuous>  piperacillin/tazobactam IVPB.. 3.375 Gram(s) IV Intermittent every 8 hours  potassium chloride  10 mEq/100 mL IVPB 10 milliEquivalent(s) IV Intermittent every 1 hour  propofol Infusion 10 MICROgram(s)/kG/Min (4.2 mL/Hr) IV Continuous <Continuous>    MEDICATIONS  (PRN):      FAMILY HISTORY:  No pertinent family history in first degree relatives        SOCIAL HISTORY:    LIVING SITUATION:  CIGARETTES: Denied  ALCOHOL: denied   ILLICIT DRUG USES: denied    REVIEW OF SYSTEMS:  pt. is unresponsive       Vital Signs Last 24 Hrs  T(C): 36.4 (13 May 2022 18:32), Max: 36.4 (13 May 2022 18:32)  T(F): 97.6 (13 May 2022 18:32), Max: 97.6 (13 May 2022 18:32)  HR: 89 (13 May 2022 18:32) (81 - 207)  BP: 124/63 (13 May 2022 18:32) (102/79 - 174/111)  BP(mean): 78 (13 May 2022 18:32) (78 - 138)  RR: 16 (13 May 2022 18:32) (12 - 34)  SpO2: 100% (13 May 2022 18:00) (74% - 100%)    PHYSICAL EXAM:  GENERAL: obtunded, + inbutated  HEART:Irregular, tachycardic ; systolic murmurs, no rubs, or gallops appreciated .  PULMONARY:  breathing  ABDOMEN: Bowel sounds present, soft,   EXTREMITIES:  Warm, no pedal edema, distal pulses present  NEUROLOGICAL: Does not respond to voice or painful stimuli.    INTERPRETATION OF TELEMETRY: Afib with RVR, episode of VT and converted to Afib       I&O's Detail      LABS:                        14.6   15.31 )-----------( 152      ( 13 May 2022 15:33 )             46.4     05-13    145  |  109<H>  |  24<H>  ----------------------------<  112<H>  3.4<L>   |  14<L>  |  0.61    Ca    9.4      13 May 2022 15:33  Phos  2.9     05-13  Mg     2.00     05-13    TPro  6.9  /  Alb  3.9  /  TBili  1.0  /  DBili  x   /  AST  42<H>  /  ALT  24  /  AlkPhos  77  05-13    CARDIAC MARKERS ( 13 May 2022 15:33 )  x     / x     / 1006 U/L / x     / 20.8 ng/mL      PT/INR - ( 13 May 2022 15:33 )   PT: 11.7 sec;   INR: 1.01 ratio         PTT - ( 13 May 2022 15:33 )  PTT:23.7 sec  Urinalysis Basic - ( 13 May 2022 17:43 )    Color: Yellow / Appearance: Slightly Turbid / S.023 / pH: x  Gluc: x / Ketone: Moderate  / Bili: Negative / Urobili: <2 mg/dL   Blood: x / Protein: 300 mg/dL / Nitrite: Negative   Leuk Esterase: Negative / RBC: 7 /HPF / WBC x   Sq Epi: x / Non Sq Epi: 4 /HPF / Bacteria: x      BNPSerum Pro-Brain Natriuretic Peptide: 56106 pg/mL ( @ 15:33)    I&O's Detail    Daily Height in cm: 165.1 (13 May 2022 14:31)    Daily     RADIOLOGY & ADDITIONAL STUDIES:    INTERPRETATION:  CLINICAL HISTORY: Trauma code. Unresponsive. Found down.    TECHNIQUE: CT of the head, maxillofacial region and cervical spine   without contrast dated 2022.  Head CT consists of transaxial images acquired from the skull base to   vertex without contrast. Coronal and sagittal reformatted images are   provided.  Maxillofacial CT consists of thin section transaxial images through the   facial region with coronal and sagittal reformatted images provided from   the transaxial source data.  Cervical spine CT withthin section transaxial images acquired from the   occiput through to T3. Coronal and sagittal reformatted images are   provided.    COMPARISON: None available.    FINDINGS:    Head CT:  Gliosis with mild volume loss is seen involving the left frontal,   parietal and temporal lobes as well as the left basal ganglia and insular   region compatible with subacute large left MCA territory infarct. There   is no acute intracranial hemorrhage, vasogenic edema, significant mass   effect with midline shift or extra-axial collection. Patchy areas of   low-attenuation in the bihemispheric white matter nonspecific but likely   sequela of mild chronic microvascular change.    Age-related involutional changes present with secondary proportional   prominence of the ventricles and sulci. There is no midline shift or   abnormal extra-axial fluid collection.    Mild asymmetric right frontal scalp swelling. The calvarium is intact.   There are no suspicious osteoblastic or lytic calvarial lesions. The   visualized mastoid air cells are clear.    Maxillofacial CT:  There are no acute facial fractures or dislocations. The orbital rims,   walls, floors, lamina papyracea and zygomatic arches are intact. The   temporomandibular joints are located.    The globes are of normal contour. Status post bilateral cataract surgery.   There is no preseptal periorbital soft tissue swelling. There is no   retrobulbar hematoma. The optic nerve sheath complexes and extraocular   muscles are symmetric and normal appearing bilaterally.    There are no air-fluid levels present within the paranasal sinuses.   Mucosal thickening involves the bilateral sphenoid sinuses with foamy   secretions on the left.    Fluid layering within the nasopharynx may be related to the presence of   an endotracheal tube. The nasopharyngeal soft tissues appear symmetric   and not enlarged.    Cervical spine CT:  The cervical alignment is intact. There are no acute fractures or   evidence of traumatic malalignment. The vertebral body heights are   maintained. Multilevel facet alignment is preserved. The atlanto-dental   interval is within normal limits and the craniocervical junction is   unremarkable. Generalized osteopenia without suspicious osteoblastic or   lytic lesions.    There is no evidence of prevertebral soft tissue swelling or evidence for   epidural hematoma.    Multilevel degenerative changes including intervertebral disc space   narrowing, small disc osteophyte complexes, facet arthropathy and   uncovertebral joint spurring. Findings contribute to multilevel canal and   foraminal stenosis, the degree of which is suboptimally assessed on this   modality but appears most notable at the C5/C6 level where it is least   mild to moderate foraminal and canal stenosis. Suboptimal assessment of   the intraspinal canal contents on this modality.    The patient is intubated with endotracheal tube partially visualized.   Small amount of secretions are seen around the tube within the upper   trachea. The soft tissues of the neck have an unremarkable noncontrast   appearance. Nonspecific groundglass and nodular opacities in the right   upper lobe.    IMPRESSION:  Head CT: Mild right frontal scalp swelling. No acute intracranial   hemorrhage, vasogenic edema, extra-axial collection or calvarial   fracture. Large subacute left MCA territory infarct without significant   mass effect or midline shift.    Maxillofacial CT: No evidence of acute fracture or dislocation.  Mucosal thickening with foamy secretions in thebilateral sphenoid   sinuses. Fluid layering in the nasopharynx.    Cervical spine CT: No evidence of acute fracture or traumatic   malalignment. Mild degenerative changes.    Nonspecific groundglass and nodular opacities in the right upper lobe.   Consider follow-up chest CT for further evaluation.             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 CT head showed large subacute left MCA territory infarct without midline shift or mass effect, neurology consulted and deemed no tPA or thrombectomy intervention. Patient was found in rapid Afib and SaO2 91% on BVM. She was intubated and sedated on propofol in ED. Episode of sustained VT with VR to 209 bpm noted and amiodarone IV load started. Currently Afib with rate controlled. EP is called for interrogation and consulted for rapid Afib /VT. Due to presentation, collateral obtained from family. Per family, patient lives at home alone and completed all ADL prior to event.   In ED: WBC; 15.31, K3.4, Lactate 4.2, Anion gap 22, Ph 7.30        PAST MEDICAL & SURGICAL HISTORY:  S/P CABG x 1      Atrial fibrillation      Hypertension      Hyperlipemia      No significant past surgical history            MEDICATIONS  (STANDING):  aMIOdarone Infusion 1 mG/Min (33.3 mL/Hr) IV Continuous <Continuous>  chlorhexidine 0.12% Liquid 15 milliLiter(s) Oral Mucosa every 12 hours  chlorhexidine 4% Liquid 1 Application(s) Topical <User Schedule>  lactated ringers. 1000 milliLiter(s) (100 mL/Hr) IV Continuous <Continuous>  piperacillin/tazobactam IVPB.. 3.375 Gram(s) IV Intermittent every 8 hours  potassium chloride  10 mEq/100 mL IVPB 10 milliEquivalent(s) IV Intermittent every 1 hour  propofol Infusion 10 MICROgram(s)/kG/Min (4.2 mL/Hr) IV Continuous <Continuous>    MEDICATIONS  (PRN):      FAMILY HISTORY:  No pertinent family history in first degree relatives        SOCIAL HISTORY:    LIVING SITUATION:  CIGARETTES: Denied  ALCOHOL: denied   ILLICIT DRUG USES: denied    REVIEW OF SYSTEMS:  pt. is unresponsive       Vital Signs Last 24 Hrs  T(C): 36.4 (13 May 2022 18:32), Max: 36.4 (13 May 2022 18:32)  T(F): 97.6 (13 May 2022 18:32), Max: 97.6 (13 May 2022 18:32)  HR: 89 (13 May 2022 18:32) (81 - 207)  BP: 124/63 (13 May 2022 18:32) (102/79 - 174/111)  BP(mean): 78 (13 May 2022 18:32) (78 - 138)  RR: 16 (13 May 2022 18:32) (12 - 34)  SpO2: 100% (13 May 2022 18:00) (74% - 100%)    PHYSICAL EXAM:  GENERAL: obtunded, + inbutated  HEART:Irregular, tachycardic ; systolic murmurs, no rubs, or gallops appreciated .  PULMONARY:  breathing  ABDOMEN: Bowel sounds present, soft,   EXTREMITIES:  Warm, no pedal edema, distal pulses present  NEUROLOGICAL: Does not respond to voice or painful stimuli.    INTERPRETATION OF TELEMETRY: Afib with RVR, episode of VT and converted to Afib       I&O's Detail      LABS:                        14.6   15.31 )-----------( 152      ( 13 May 2022 15:33 )             46.4     05-13    145  |  109<H>  |  24<H>  ----------------------------<  112<H>  3.4<L>   |  14<L>  |  0.61    Ca    9.4      13 May 2022 15:33  Phos  2.9     05-13  Mg     2.00     05-13    TPro  6.9  /  Alb  3.9  /  TBili  1.0  /  DBili  x   /  AST  42<H>  /  ALT  24  /  AlkPhos  77  05-13    CARDIAC MARKERS ( 13 May 2022 15:33 )  x     / x     / 1006 U/L / x     / 20.8 ng/mL      PT/INR - ( 13 May 2022 15:33 )   PT: 11.7 sec;   INR: 1.01 ratio         PTT - ( 13 May 2022 15:33 )  PTT:23.7 sec  Urinalysis Basic - ( 13 May 2022 17:43 )    Color: Yellow / Appearance: Slightly Turbid / S.023 / pH: x  Gluc: x / Ketone: Moderate  / Bili: Negative / Urobili: <2 mg/dL   Blood: x / Protein: 300 mg/dL / Nitrite: Negative   Leuk Esterase: Negative / RBC: 7 /HPF / WBC x   Sq Epi: x / Non Sq Epi: 4 /HPF / Bacteria: x      BNPSerum Pro-Brain Natriuretic Peptide: 23351 pg/mL ( @ 15:33)    I&O's Detail    Daily Height in cm: 165.1 (13 May 2022 14:31)    Daily     RADIOLOGY & ADDITIONAL STUDIES:    INTERPRETATION:  CLINICAL HISTORY: Trauma code. Unresponsive. Found down.    TECHNIQUE: CT of the head, maxillofacial region and cervical spine   without contrast dated 2022.  Head CT consists of transaxial images acquired from the skull base to   vertex without contrast. Coronal and sagittal reformatted images are   provided.  Maxillofacial CT consists of thin section transaxial images through the   facial region with coronal and sagittal reformatted images provided from   the transaxial source data.  Cervical spine CT withthin section transaxial images acquired from the   occiput through to T3. Coronal and sagittal reformatted images are   provided.    COMPARISON: None available.    FINDINGS:    Head CT:  Gliosis with mild volume loss is seen involving the left frontal,   parietal and temporal lobes as well as the left basal ganglia and insular   region compatible with subacute large left MCA territory infarct. There   is no acute intracranial hemorrhage, vasogenic edema, significant mass   effect with midline shift or extra-axial collection. Patchy areas of   low-attenuation in the bihemispheric white matter nonspecific but likely   sequela of mild chronic microvascular change.    Age-related involutional changes present with secondary proportional   prominence of the ventricles and sulci. There is no midline shift or   abnormal extra-axial fluid collection.    Mild asymmetric right frontal scalp swelling. The calvarium is intact.   There are no suspicious osteoblastic or lytic calvarial lesions. The   visualized mastoid air cells are clear.    Maxillofacial CT:  There are no acute facial fractures or dislocations. The orbital rims,   walls, floors, lamina papyracea and zygomatic arches are intact. The   temporomandibular joints are located.    The globes are of normal contour. Status post bilateral cataract surgery.   There is no preseptal periorbital soft tissue swelling. There is no   retrobulbar hematoma. The optic nerve sheath complexes and extraocular   muscles are symmetric and normal appearing bilaterally.    There are no air-fluid levels present within the paranasal sinuses.   Mucosal thickening involves the bilateral sphenoid sinuses with foamy   secretions on the left.    Fluid layering within the nasopharynx may be related to the presence of   an endotracheal tube. The nasopharyngeal soft tissues appear symmetric   and not enlarged.    Cervical spine CT:  The cervical alignment is intact. There are no acute fractures or   evidence of traumatic malalignment. The vertebral body heights are   maintained. Multilevel facet alignment is preserved. The atlanto-dental   interval is within normal limits and the craniocervical junction is   unremarkable. Generalized osteopenia without suspicious osteoblastic or   lytic lesions.    There is no evidence of prevertebral soft tissue swelling or evidence for   epidural hematoma.    Multilevel degenerative changes including intervertebral disc space   narrowing, small disc osteophyte complexes, facet arthropathy and   uncovertebral joint spurring. Findings contribute to multilevel canal and   foraminal stenosis, the degree of which is suboptimally assessed on this   modality but appears most notable at the C5/C6 level where it is least   mild to moderate foraminal and canal stenosis. Suboptimal assessment of   the intraspinal canal contents on this modality.    The patient is intubated with endotracheal tube partially visualized.   Small amount of secretions are seen around the tube within the upper   trachea. The soft tissues of the neck have an unremarkable noncontrast   appearance. Nonspecific groundglass and nodular opacities in the right   upper lobe.    IMPRESSION:  Head CT: Mild right frontal scalp swelling. No acute intracranial   hemorrhage, vasogenic edema, extra-axial collection or calvarial   fracture. Large subacute left MCA territory infarct without significant   mass effect or midline shift.    Maxillofacial CT: No evidence of acute fracture or dislocation.  Mucosal thickening with foamy secretions in thebilateral sphenoid   sinuses. Fluid layering in the nasopharynx.    Cervical spine CT: No evidence of acute fracture or traumatic   malalignment. Mild degenerative changes.    Nonspecific groundglass and nodular opacities in the right upper lobe.   Consider follow-up chest CT for further evaluation.

## 2022-05-13 NOTE — H&P ADULT - NSHPLABSRESULTS_GEN_ALL_CORE
ICU Vital Signs Last 24 Hrs  T(C): --  T(F): --  HR: 91 (13 May 2022 17:30) (81 - 207)  BP: 150/79 (13 May 2022 17:30) (102/79 - 174/111)  BP(mean): 102 (13 May 2022 17:30) (79 - 138)  ABP: --  ABP(mean): --  RR: 18 (13 May 2022 17:30) (12 - 34)  SpO2: 100% (13 May 2022 17:30) (74% - 100%)    LABS:                        14.6   15.31 )-----------( 152      ( 13 May 2022 15:33 )             46.4     Hemoglobin: 14.6 g/dL (05-13 @ 15:33)    CBC Full  -  ( 13 May 2022 15:33 )  WBC Count : 15.31 K/uL  RBC Count : 4.91 M/uL  Hemoglobin : 14.6 g/dL  Hematocrit : 46.4 %  Platelet Count - Automated : 152 K/uL  Mean Cell Volume : 94.5 fL  Mean Cell Hemoglobin : 29.7 pg  Mean Cell Hemoglobin Concentration : 31.5 gm/dL  Auto Neutrophil # : 12.67 K/uL  Auto Lymphocyte # : 0.52 K/uL  Auto Monocyte # : 1.97 K/uL  Auto Eosinophil # : 0.00 K/uL  Auto Basophil # : 0.02 K/uL  Auto Neutrophil % : 82.8 %  Auto Lymphocyte % : 3.4 %  Auto Monocyte % : 12.9 %  Auto Eosinophil % : 0.0 %  Auto Basophil % : 0.1 %    05-13    145  |  109<H>  |  24<H>  ----------------------------<  112<H>  3.4<L>   |  14<L>  |  0.61    Ca    9.4      13 May 2022 15:33  Phos  2.9     05-13  Mg     2.00     05-13    TPro  6.9  /  Alb  3.9  /  TBili  1.0  /  DBili  x   /  AST  42<H>  /  ALT  24  /  AlkPhos  77  05-13    Creatinine Trend: 0.61<--  LIVER FUNCTIONS - ( 13 May 2022 15:33 )  Alb: 3.9 g/dL / Pro: 6.9 g/dL / ALK PHOS: 77 U/L / ALT: 24 U/L / AST: 42 U/L / GGT: x           PT/INR - ( 13 May 2022 15:33 )   PT: 11.7 sec;   INR: 1.01 ratio         PTT - ( 13 May 2022 15:33 )  PTT:23.7 sec    hs Troponin:                36 <<== 05-13-22 @ 15:33        15:33 - VBG - pH: 7.30  | pCO2: 51    | pO2: 32    | Lactate: 4.2          CSF:                      EKG:   MICROBIOLOGY:    IMAGING:      Labs, imaging, EKG personally reviewed    RADIOLOGY & ADDITIONAL TESTS: Reviewed. ICU Vital Signs Last 24 Hrs  T(C): --  T(F): --  HR: 91 (13 May 2022 17:30) (81 - 207)  BP: 150/79 (13 May 2022 17:30) (102/79 - 174/111)  BP(mean): 102 (13 May 2022 17:30) (79 - 138)  ABP: --  ABP(mean): --  RR: 18 (13 May 2022 17:30) (12 - 34)  SpO2: 100% (13 May 2022 17:30) (74% - 100%)    LABS:                        14.6   15.31 )-----------( 152      ( 13 May 2022 15:33 )             46.4     Hemoglobin: 14.6 g/dL (05-13 @ 15:33)    CBC Full  -  ( 13 May 2022 15:33 )  WBC Count : 15.31 K/uL  RBC Count : 4.91 M/uL  Hemoglobin : 14.6 g/dL  Hematocrit : 46.4 %  Platelet Count - Automated : 152 K/uL  Mean Cell Volume : 94.5 fL  Mean Cell Hemoglobin : 29.7 pg  Mean Cell Hemoglobin Concentration : 31.5 gm/dL  Auto Neutrophil # : 12.67 K/uL  Auto Lymphocyte # : 0.52 K/uL  Auto Monocyte # : 1.97 K/uL  Auto Eosinophil # : 0.00 K/uL  Auto Basophil # : 0.02 K/uL  Auto Neutrophil % : 82.8 %  Auto Lymphocyte % : 3.4 %  Auto Monocyte % : 12.9 %  Auto Eosinophil % : 0.0 %  Auto Basophil % : 0.1 %    05-13    145  |  109<H>  |  24<H>  ----------------------------<  112<H>  3.4<L>   |  14<L>  |  0.61    Ca    9.4      13 May 2022 15:33  Phos  2.9     05-13  Mg     2.00     05-13    TPro  6.9  /  Alb  3.9  /  TBili  1.0  /  DBili  x   /  AST  42<H>  /  ALT  24  /  AlkPhos  77  05-13    Creatinine Trend: 0.61<--  LIVER FUNCTIONS - ( 13 May 2022 15:33 )  Alb: 3.9 g/dL / Pro: 6.9 g/dL / ALK PHOS: 77 U/L / ALT: 24 U/L / AST: 42 U/L / GGT: x           PT/INR - ( 13 May 2022 15:33 )   PT: 11.7 sec;   INR: 1.01 ratio         PTT - ( 13 May 2022 15:33 )  PTT:23.7 sec    hs Troponin:                36 <<== 05-13-22 @ 15:33        15:33 - VBG - pH: 7.30  | pCO2: 51    | pO2: 32    | Lactate: 4.2            EKG:   MICROBIOLOGY:    IMAGING:      Labs, imaging, EKG personally reviewed    RADIOLOGY & ADDITIONAL TESTS: Reviewed.

## 2022-05-13 NOTE — ED ADULT NURSE REASSESSMENT NOTE - NS ED NURSE REASSESS COMMENT FT1
Mobile Critical Care RN: patient is 89/F PMHx Afib on Xarelto, HTN, CAD s/p CABG, HLD presenting unresponsive from home. Last known normal is wednesday. son and grandson at the bedside. patient was emergently intubated with 6.5 ETT 23 @ lip 16/400/5/100%, sedated currently on 10mcg/kg/min of propofol, no purposeful movements noted. patient has a pacemaker, Noted to have increased HR and variable rhythms including rapid AF with RVR to 180s, SVT and VT with a pulse up to 220. PIV x3, amio 150mg bolus given and maintenance drip started. meds given as ordered. CT scan completed. begum in placed with clear yellow output. ecchymosis noted to right side of body.

## 2022-05-13 NOTE — H&P ADULT - ASSESSMENT
Patient is a 89 y/o F w/ PMHx HTN, HLD, Afib on Xarelto, CAD s/p CABG presenting to the ED after being found unresponsive at home. On Tuesday night, her neighbors went to check on her and found her lying on the kitchen floor with swelling to the right side of the face. At the time, she was not responsive. Patient was brought in by EMS and was intubated and sedated in the ED. At this time, she responded to painful stimuli only. ED Vitals: BP __ HR  __ RR  __ __ on ___. CT head showed large subacute left MCA territory infarct without midline shift or mass effect. Patient lives at home with _____.     Neuro   - Intubated, sedated  - On propofol     #CVA  - CT non-Con: Large subacute left MCA territory infarct without significant mass effect or midline shift  - Presentation concerning for ischemic stroke   - Permissive hypertension   - F/U MRI if within GOC   - Neurology following, recs appreciated       CV  - /88    #Afib RVR  - In the ED, found to be in Afib RVR with rates up to ___  - Home meds: ___  - Started on Amio gtt in the ED, now sinus   - CHADSVASC > 2, Patient previously on Xarelto   - hold A/C at this time in setting of recent CVA to avoid hemorraghic conversion       #CAD s/p CABG   - No recorded echo on file   - Home meds:   - Hold __ and allow for permissive hypertension   - F/U POCUS to assess volume status and LV function       Respiratory   - Intubated   - Volume Control: TV __ RR __ FiO2 __ PEEP __   - ABG: pH 7.3 Co2 51 O2 32 HCO3 25 - Metabolic      GI  - No active issues   - CT Abd:       Renal   #Rhabdomyolysis   - Scr .61 at baseline   - CK 1006  - Aggressive fluid administration   - Strict I/O, avoid nephrotoxic agents  - Trend BMP     ID  - WBC 14 with neutrophilic predominance   - CT chest: patchy opacities bilaterally 2/2 infection vs pulmonary edema   - Urinalysis neg  - F/U blood cx, urine cx  - Start on Vanc/zosyn   - F/U MRSA swab     Endo   - No active issues     Heme   - SCD               Patient is a 89 y/o F w/ PMHx HTN, HLD, Afib on Xarelto, CAD s/p CABG presenting to the ED after being found unresponsive at home. CT head showed large subacute left MCA territory infarct without midline shift or mass effect concerning for CVA; Transferred to MICU for further management.       Neuro   - Intubated, sedated  - On propofol     #CVA  - CT non-Con: Large subacute left MCA territory infarct without significant mass effect or midline shift  - Presentation concerning for ischemic stroke   - Permissive hypertension   - F/U MRI if within GOC   - Neurology following, recs appreciated       CV  - /88    #Afib RVR  - In the ED, found to be in Afib RVR with rates up to ___  - Home meds: ___  - Started on Amio gtt in the ED, now sinus   - CHADSVASC > 2, Patient previously on Xarelto   - hold A/C at this time in setting of recent CVA to avoid hemorraghic conversion       #CAD s/p CABG   - No recorded echo on file   - Home meds:   - Hold __ and allow for permissive hypertension   - F/U POCUS to assess volume status and LV function       Respiratory   - Intubated   - Volume Control: TV __ RR __ FiO2 __ PEEP __   - ABG: pH 7.3 Co2 51 O2 32 HCO3 25 - Metabolic      GI  - No active issues   - CT Abd:       Renal   #Rhabdomyolysis   - Scr .61 at baseline   - CK 1006  - Aggressive fluid administration   - Strict I/O, avoid nephrotoxic agents  - Trend BMP     ID  - WBC 14 with neutrophilic predominance   - CT chest: patchy opacities bilaterally 2/2 infection vs pulmonary edema   - Urinalysis neg  - F/U blood cx, urine cx  - Start on Vanc/zosyn   - F/U MRSA swab     Endo   - No active issues     Heme   - SCD               Patient is a 89 y/o F w/ PMHx HTN, HLD, Afib on Xarelto, CAD s/p CABG presenting to the ED after being found unresponsive at home. CT head showed large subacute left MCA territory infarct without midline shift or mass effect concerning for CVA; Transferred to MICU for further management.       Neuro   - Intubated, sedated  - On propofol     #CVA  - CT non-Con: Large subacute left MCA territory infarct without significant mass effect or midline shift  - Presentation concerning for ischemic stroke   - Permissive hypertension   - F/U MRI if within GOC   - F/U neurology regarding ASA dosing   - Neurology following, recs appreciated       CV  - /88    #Afib RVR  - In the ED, found to be in Afib RVR with rates to >160  - Home meds: Pending med rec from patient family   - Started on Amio gtt in the ED, now sinus   - Has AICD in place; will need interrogation   - CHADSVASC > 2, Patient previously on Xarelto   - hold A/C at this time in setting of recent CVA to avoid hemorraghic conversion       #CAD s/p CABG   - No recorded echo on file   - Home meds:   - Hold BP medications and allow for permissive hypertension   - F/U POCUS to assess volume status and LV function; if abnormal can order formal echo       Respiratory   - Intubated, volume control   - ABG: pH 7.3 Co2 51 O2 32 HCO3 25   - F/U ABG, titrate vent settings       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 CT head showed large subacute left MCA territory infarct without midline shift or mass effect concerning for CVA; Transferred to MICU for further management.       Neuro   - Intubated, sedated  - On propofol     #CVA  - CT non-Con: Large subacute left MCA territory infarct without significant mass effect or midline shift  - Presentation concerning for ischemic stroke   - F/U MRI if within GOC   - Hold ASA  - Neurology following, recs appreciated       CV  - /88    #Afib RVR  - In the ED, found to be in Afib RVR with rates to >160  - Home meds: Pending med rec from patient family   - Started on Amio gtt in the ED, now sinus   - Has AICD in place; will need interrogation   - CHADSVASC > 2, Patient previously on Xarelto   - hold A/C at this time in setting of recent CVA to avoid hemorraghic conversion       #CAD s/p CABG   - No recorded echo on file   - No indication for permissive hypertension per neuro   - F/U POCUS to assess volume status and LV function; if abnormal can order formal echo       Respiratory   - Intubated, volume control   - ABG: pH 7.3 Co2 51 O2 32 HCO3 25   - F/U ABG, titrate vent settings       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

## 2022-05-13 NOTE — ED ADULT NURSE REASSESSMENT NOTE - NS ED NURSE REASSESS COMMENT FT1
Mobile Critical Care RN: patient remains in TR-C, propofol gtt held for neuro assessment. non purposeful movement noted on the right side. no movement noted on the left side. remains in irregular HR and rhythm, 70s-100s. BP stable MAP above 65. amio maintenance gtt infusing.

## 2022-05-13 NOTE — ED PROVIDER NOTE - PROGRESS NOTE DETAILS
Kianna ROBERTSON (PGY-3): Patient started on Amio drip, intermittent Afib w/ RVR and vtach. Taken to CT scan. After amio load, HR improved to 70s-90s. Cosme, PGY3: CT scan visualized consistent with likely ischemic stroke. neurology consulted and will see pt in ED. MICU consulted for bed, will see pt in ED Caitlin ALEJANDRA: Patient signed out pending MICU evaluation and dispo. Patient seen and evaluated by MICU, recommending admission to Dr. Carolina.

## 2022-05-13 NOTE — ED PROVIDER NOTE - OBJECTIVE STATEMENT
Leighann:     Full code. 89y F w/ PMHx Afib on Xarelto, HTN, CAD s/p CABG, HLD presenting unresponsive from home. Patient LKW Tuesday, neighbors went to check on patient today and found her lying on ground unresponsive. Patient arrived to room being bagged with BVM, not responding to verbal stimuli, flexes to painful stimuli. Patient found lying in urine with swelling to R-side of face. Family arrived to ED, states patient is full code.

## 2022-05-13 NOTE — PATIENT PROFILE ADULT - FALL HARM RISK - RISK INTERVENTIONS

## 2022-05-13 NOTE — H&P ADULT - NSHPPHYSICALEXAM_GEN_ALL_CORE
Gen: AAOx3, non-toxic  Head: NCAT  HEENT: EOMI, oral mucosa moist, normal conjunctiva  Lung: CTAB, no respiratory distress, no wheezes/rhonchi/rales B/L, speaking in full sentences  CV: RRR, no murmurs, rubs or gallops  Abd: soft, NTND, no guarding, no CVA tenderness  MSK: no visible deformities  Neuro: No focal sensory or motor deficits, normal CN exam   Skin: Warm, well perfused, no rash  Psych: normal affect. Gen: Intubated, sedated   Head: NCAT  HEENT: EOMI, oral mucosa moist, normal conjunctiva  Lung: CTAB, course breath sounds   CV: RRR, no murmurs, rubs or gallops, no le edema, no jvd   Abd: soft, NTND, no guarding, no CVA tenderness  MSK: no visible deformities  Neuro: No focal sensory or motor deficits, normal CN exam   Skin: Warm, well perfused, no rash  Psych: normal affect.

## 2022-05-13 NOTE — CONSULT NOTE ADULT - ASSESSMENT
89yo R-handed woman with PMH of afib (reportedly on Xarelto), CABG, HTN, and HLD presents to the ED after being found down and unresponsive. History obtained from son and grandson at bedside. LKN 2 days ago, reportedly 5/11/22 in the evening when they spoke to her over the phone. Initial NIHSS 30, however patient was sedated on propofol and on reassessment off propofol it was 27. Pre-MRS 0. CTH demonstrates a large MCA territory infarct. Patient is not a candidate for tPA or thrombectomy since she is out of window with evidence of large CVA on CT.    Impression: Unresponsiveness due to large L MCA territory infarct likely embolic in the setting of known atrial fibrillation.    Recommendations:  [] No indication for permissive HTN, BP goal per MICU and cardiology  [] Telemetry monitoring  [] Hold AC given large size of CVA  [] Monitor CBC, BMP  [] BG <180, avoid hypoglycemia  [] Palliative care consult and GOC conversation: son wishes for patient to be DNR  [] Neuro checks and vital signs per unit protocol  [] Rest of care per primary team      Case seen and discussed with neurology attending Dr. Hermosillo.

## 2022-05-13 NOTE — CONSULT NOTE ADULT - NS ATTEND AMEND GEN_ALL_CORE FT
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 CT head showed large subacute left MCA territory infarct without midline shift or mass effect, neurology consulted and deemed no tPA or thrombectomy intervention. Patient was found in rapid Afib and SaO2 91% on BVM. She was intubated and sedated on propofol in ED. Episode of sustained VT with VR to 209 bpm noted and amiodarone IV load started. Currently Afib with rate controlled. EP is called for interrogation and consulted for rapid Afib /VT. Due to presentation, collateral obtained from family. Per family, patient lives at home alone and completed all ADL prior to event. Rapid Afib and VT. Continuous telemetric monitoring for tachyarrhythmia. Continue Amiodarone IV load. Recommend Lopressor 5mg IV q4h PRN if HR >110bpm if BP tolerates. Obtain Echo to assess LV/RV function. Recommend AC (pt. is taking Xarelto at home) for thromboembolic ppx  given that patient has a [id number] score of 6 if no clinical contraindication and cleared by Neuro

## 2022-05-14 LAB
A1C WITH ESTIMATED AVERAGE GLUCOSE RESULT: 5.9 % — HIGH (ref 4–5.6)
ALBUMIN SERPL ELPH-MCNC: 3.7 G/DL — SIGNIFICANT CHANGE UP (ref 3.3–5)
ALP SERPL-CCNC: 73 U/L — SIGNIFICANT CHANGE UP (ref 40–120)
ALT FLD-CCNC: 21 U/L — SIGNIFICANT CHANGE UP (ref 4–33)
ANION GAP SERPL CALC-SCNC: 13 MMOL/L — SIGNIFICANT CHANGE UP (ref 7–14)
AST SERPL-CCNC: 40 U/L — HIGH (ref 4–32)
BASE EXCESS BLDV CALC-SCNC: 2 MMOL/L — SIGNIFICANT CHANGE UP (ref -2–3)
BASOPHILS # BLD AUTO: 0.02 K/UL — SIGNIFICANT CHANGE UP (ref 0–0.2)
BASOPHILS NFR BLD AUTO: 0.2 % — SIGNIFICANT CHANGE UP (ref 0–2)
BILIRUB SERPL-MCNC: 1.2 MG/DL — SIGNIFICANT CHANGE UP (ref 0.2–1.2)
BLOOD GAS VENOUS COMPREHENSIVE RESULT: SIGNIFICANT CHANGE UP
BUN SERPL-MCNC: 26 MG/DL — HIGH (ref 7–23)
CALCIUM SERPL-MCNC: 9.3 MG/DL — SIGNIFICANT CHANGE UP (ref 8.4–10.5)
CHLORIDE BLDV-SCNC: 104 MMOL/L — SIGNIFICANT CHANGE UP (ref 96–108)
CHLORIDE SERPL-SCNC: 103 MMOL/L — SIGNIFICANT CHANGE UP (ref 98–107)
CK SERPL-CCNC: 748 U/L — HIGH (ref 25–170)
CO2 BLDV-SCNC: 27.7 MMOL/L — HIGH (ref 22–26)
CO2 SERPL-SCNC: 23 MMOL/L — SIGNIFICANT CHANGE UP (ref 22–31)
CREAT SERPL-MCNC: 0.87 MG/DL — SIGNIFICANT CHANGE UP (ref 0.5–1.3)
CULTURE RESULTS: NO GROWTH — SIGNIFICANT CHANGE UP
EGFR: 63 ML/MIN/1.73M2 — SIGNIFICANT CHANGE UP
EOSINOPHIL # BLD AUTO: 0.01 K/UL — SIGNIFICANT CHANGE UP (ref 0–0.5)
EOSINOPHIL NFR BLD AUTO: 0.1 % — SIGNIFICANT CHANGE UP (ref 0–6)
ESTIMATED AVERAGE GLUCOSE: 123 — SIGNIFICANT CHANGE UP
GAS PNL BLDV: 137 MMOL/L — SIGNIFICANT CHANGE UP (ref 136–145)
GAS PNL BLDV: SIGNIFICANT CHANGE UP
GLUCOSE BLDV-MCNC: 102 MG/DL — HIGH (ref 70–99)
GLUCOSE SERPL-MCNC: 101 MG/DL — HIGH (ref 70–99)
HCO3 BLDV-SCNC: 26 MMOL/L — SIGNIFICANT CHANGE UP (ref 22–29)
HCT VFR BLD CALC: 42.3 % — SIGNIFICANT CHANGE UP (ref 34.5–45)
HCT VFR BLDA CALC: 40 % — SIGNIFICANT CHANGE UP (ref 34.5–46.5)
HGB BLD CALC-MCNC: 13.4 G/DL — SIGNIFICANT CHANGE UP (ref 11.5–15.5)
HGB BLD-MCNC: 13.4 G/DL — SIGNIFICANT CHANGE UP (ref 11.5–15.5)
IANC: 10.78 K/UL — HIGH (ref 1.8–7.4)
IMM GRANULOCYTES NFR BLD AUTO: 0.6 % — SIGNIFICANT CHANGE UP (ref 0–1.5)
LACTATE BLDV-MCNC: 2.3 MMOL/L — HIGH (ref 0.5–2)
LYMPHOCYTES # BLD AUTO: 0.62 K/UL — LOW (ref 1–3.3)
LYMPHOCYTES # BLD AUTO: 5 % — LOW (ref 13–44)
MAGNESIUM SERPL-MCNC: 2.6 MG/DL — SIGNIFICANT CHANGE UP (ref 1.6–2.6)
MCHC RBC-ENTMCNC: 29.5 PG — SIGNIFICANT CHANGE UP (ref 27–34)
MCHC RBC-ENTMCNC: 31.7 GM/DL — LOW (ref 32–36)
MCV RBC AUTO: 93 FL — SIGNIFICANT CHANGE UP (ref 80–100)
MONOCYTES # BLD AUTO: 0.97 K/UL — HIGH (ref 0–0.9)
MONOCYTES NFR BLD AUTO: 7.8 % — SIGNIFICANT CHANGE UP (ref 2–14)
NEUTROPHILS # BLD AUTO: 10.78 K/UL — HIGH (ref 1.8–7.4)
NEUTROPHILS NFR BLD AUTO: 86.3 % — HIGH (ref 43–77)
NRBC # BLD: 0 /100 WBCS — SIGNIFICANT CHANGE UP
NRBC # FLD: 0 K/UL — SIGNIFICANT CHANGE UP
NT-PROBNP SERPL-SCNC: HIGH PG/ML
PCO2 BLDV: 40 MMHG — SIGNIFICANT CHANGE UP (ref 39–42)
PH BLDV: 7.43 — SIGNIFICANT CHANGE UP (ref 7.32–7.43)
PHOSPHATE SERPL-MCNC: 2.4 MG/DL — LOW (ref 2.5–4.5)
PLATELET # BLD AUTO: 181 K/UL — SIGNIFICANT CHANGE UP (ref 150–400)
PO2 BLDV: 37 MMHG — SIGNIFICANT CHANGE UP
POTASSIUM BLDV-SCNC: 3.6 MMOL/L — SIGNIFICANT CHANGE UP (ref 3.5–5.1)
POTASSIUM SERPL-MCNC: 3.6 MMOL/L — SIGNIFICANT CHANGE UP (ref 3.5–5.3)
POTASSIUM SERPL-SCNC: 3.6 MMOL/L — SIGNIFICANT CHANGE UP (ref 3.5–5.3)
PROT SERPL-MCNC: 6.6 G/DL — SIGNIFICANT CHANGE UP (ref 6–8.3)
RBC # BLD: 4.55 M/UL — SIGNIFICANT CHANGE UP (ref 3.8–5.2)
RBC # FLD: 17 % — HIGH (ref 10.3–14.5)
SAO2 % BLDV: 59.5 % — SIGNIFICANT CHANGE UP
SODIUM SERPL-SCNC: 139 MMOL/L — SIGNIFICANT CHANGE UP (ref 135–145)
SPECIMEN SOURCE: SIGNIFICANT CHANGE UP
WBC # BLD: 12.47 K/UL — HIGH (ref 3.8–10.5)
WBC # FLD AUTO: 12.47 K/UL — HIGH (ref 3.8–10.5)

## 2022-05-14 PROCEDURE — 99291 CRITICAL CARE FIRST HOUR: CPT

## 2022-05-14 PROCEDURE — 71045 X-RAY EXAM CHEST 1 VIEW: CPT | Mod: 26

## 2022-05-14 PROCEDURE — 93306 TTE W/DOPPLER COMPLETE: CPT | Mod: 26

## 2022-05-14 RX ORDER — FENTANYL CITRATE 50 UG/ML
50 INJECTION INTRAVENOUS ONCE
Refills: 0 | Status: DISCONTINUED | OUTPATIENT
Start: 2022-05-14 | End: 2022-05-14

## 2022-05-14 RX ORDER — DEXMEDETOMIDINE HYDROCHLORIDE IN 0.9% SODIUM CHLORIDE 4 UG/ML
0.01 INJECTION INTRAVENOUS
Qty: 400 | Refills: 0 | Status: DISCONTINUED | OUTPATIENT
Start: 2022-05-14 | End: 2022-05-16

## 2022-05-14 RX ORDER — AMIODARONE HYDROCHLORIDE 400 MG/1
400 TABLET ORAL EVERY 8 HOURS
Refills: 0 | Status: DISCONTINUED | OUTPATIENT
Start: 2022-05-15 | End: 2022-05-15

## 2022-05-14 RX ORDER — AMIODARONE HYDROCHLORIDE 400 MG/1
200 TABLET ORAL DAILY
Refills: 0 | Status: DISCONTINUED | OUTPATIENT
Start: 2022-05-14 | End: 2022-05-14

## 2022-05-14 RX ORDER — POTASSIUM PHOSPHATE, MONOBASIC POTASSIUM PHOSPHATE, DIBASIC 236; 224 MG/ML; MG/ML
15 INJECTION, SOLUTION INTRAVENOUS ONCE
Refills: 0 | Status: COMPLETED | OUTPATIENT
Start: 2022-05-14 | End: 2022-05-14

## 2022-05-14 RX ORDER — PHENYLEPHRINE HYDROCHLORIDE 10 MG/ML
0.5 INJECTION INTRAVENOUS
Qty: 40 | Refills: 0 | Status: DISCONTINUED | OUTPATIENT
Start: 2022-05-14 | End: 2022-05-15

## 2022-05-14 RX ORDER — ACETAMINOPHEN 500 MG
1000 TABLET ORAL ONCE
Refills: 0 | Status: COMPLETED | OUTPATIENT
Start: 2022-05-14 | End: 2022-05-14

## 2022-05-14 RX ORDER — HEPARIN SODIUM 5000 [USP'U]/ML
5000 INJECTION INTRAVENOUS; SUBCUTANEOUS EVERY 8 HOURS
Refills: 0 | Status: DISCONTINUED | OUTPATIENT
Start: 2022-05-14 | End: 2022-05-24

## 2022-05-14 RX ORDER — SODIUM CHLORIDE 9 MG/ML
1000 INJECTION, SOLUTION INTRAVENOUS
Refills: 0 | Status: DISCONTINUED | OUTPATIENT
Start: 2022-05-14 | End: 2022-05-15

## 2022-05-14 RX ORDER — PANTOPRAZOLE SODIUM 20 MG/1
40 TABLET, DELAYED RELEASE ORAL ONCE
Refills: 0 | Status: DISCONTINUED | OUTPATIENT
Start: 2022-05-14 | End: 2022-05-14

## 2022-05-14 RX ORDER — ACETAMINOPHEN 500 MG
650 TABLET ORAL EVERY 6 HOURS
Refills: 0 | Status: DISCONTINUED | OUTPATIENT
Start: 2022-05-14 | End: 2022-05-20

## 2022-05-14 RX ADMIN — POTASSIUM PHOSPHATE, MONOBASIC POTASSIUM PHOSPHATE, DIBASIC 62.5 MILLIMOLE(S): 236; 224 INJECTION, SOLUTION INTRAVENOUS at 03:45

## 2022-05-14 RX ADMIN — CHLORHEXIDINE GLUCONATE 15 MILLILITER(S): 213 SOLUTION TOPICAL at 17:19

## 2022-05-14 RX ADMIN — FENTANYL CITRATE 50 MICROGRAM(S): 50 INJECTION INTRAVENOUS at 16:58

## 2022-05-14 RX ADMIN — Medication 1000 MILLIGRAM(S): at 11:37

## 2022-05-14 RX ADMIN — HEPARIN SODIUM 5000 UNIT(S): 5000 INJECTION INTRAVENOUS; SUBCUTANEOUS at 21:44

## 2022-05-14 RX ADMIN — SODIUM CHLORIDE 100 MILLILITER(S): 9 INJECTION, SOLUTION INTRAVENOUS at 07:22

## 2022-05-14 RX ADMIN — Medication 650 MILLIGRAM(S): at 18:43

## 2022-05-14 RX ADMIN — CHLORHEXIDINE GLUCONATE 1 APPLICATION(S): 213 SOLUTION TOPICAL at 06:09

## 2022-05-14 RX ADMIN — DEXMEDETOMIDINE HYDROCHLORIDE IN 0.9% SODIUM CHLORIDE 0.13 MICROGRAM(S)/KG/HR: 4 INJECTION INTRAVENOUS at 19:23

## 2022-05-14 RX ADMIN — PIPERACILLIN AND TAZOBACTAM 25 GRAM(S): 4; .5 INJECTION, POWDER, LYOPHILIZED, FOR SOLUTION INTRAVENOUS at 04:35

## 2022-05-14 RX ADMIN — PIPERACILLIN AND TAZOBACTAM 25 GRAM(S): 4; .5 INJECTION, POWDER, LYOPHILIZED, FOR SOLUTION INTRAVENOUS at 13:51

## 2022-05-14 RX ADMIN — PHENYLEPHRINE HYDROCHLORIDE 9.9 MICROGRAM(S)/KG/MIN: 10 INJECTION INTRAVENOUS at 07:20

## 2022-05-14 RX ADMIN — Medication 400 MILLIGRAM(S): at 09:43

## 2022-05-14 RX ADMIN — Medication 650 MILLIGRAM(S): at 19:13

## 2022-05-14 RX ADMIN — PROPOFOL 4.2 MICROGRAM(S)/KG/MIN: 10 INJECTION, EMULSION INTRAVENOUS at 19:24

## 2022-05-14 RX ADMIN — HEPARIN SODIUM 5000 UNIT(S): 5000 INJECTION INTRAVENOUS; SUBCUTANEOUS at 14:55

## 2022-05-14 RX ADMIN — AMIODARONE HYDROCHLORIDE 16.7 MG/MIN: 400 TABLET ORAL at 07:41

## 2022-05-14 RX ADMIN — PIPERACILLIN AND TAZOBACTAM 25 GRAM(S): 4; .5 INJECTION, POWDER, LYOPHILIZED, FOR SOLUTION INTRAVENOUS at 21:43

## 2022-05-14 RX ADMIN — PHENYLEPHRINE HYDROCHLORIDE 9.9 MICROGRAM(S)/KG/MIN: 10 INJECTION INTRAVENOUS at 19:23

## 2022-05-14 RX ADMIN — CHLORHEXIDINE GLUCONATE 15 MILLILITER(S): 213 SOLUTION TOPICAL at 06:09

## 2022-05-14 RX ADMIN — PROPOFOL 4.2 MICROGRAM(S)/KG/MIN: 10 INJECTION, EMULSION INTRAVENOUS at 07:20

## 2022-05-14 NOTE — DIETITIAN INITIAL EVALUATION ADULT - ADD RECOMMEND
1). Nutrition plan of care as per GOC discussion with family. 2). Monitor weights, labs, BM's, skin integrity. 3). Follow pt as per protocol.

## 2022-05-14 NOTE — PROGRESS NOTE ADULT - ASSESSMENT
89yo R-handed woman with PMH of afib (reportedly on Xarelto), CABG, HTN, and HLD presents to the ED after being found down and unresponsive. History obtained from son and grandson at bedside. HELDER 2 days ago, reportedly 5/11/22 in the evening when they spoke to her over the phone. Initial NIHSS 30, however patient was sedated on propofol and on reassessment off propofol it was 27. Pre-MRS 0. CTH demonstrates a large MCA territory infarct. Patient is not a candidate for tPA or thrombectomy since she is out of window with evidence of large CVA on CT.    Impression: Unresponsiveness due to large L MCA territory infarct likely embolic in the setting of known atrial fibrillation.    Recommendations:  [] No indication for permissive HTN, BP goal per MICU and cardiology  [] Telemetry monitoring  [] Hold AC given large size of CVA  [] Monitor CBC, BMP  [] BG <180, avoid hypoglycemia  [] Palliative care consult and GOC conversation: pt is now DNR, ongoing GOC discussion  [] Neuro checks and vital signs per unit protocol  [] Rest of care per primary team    Case seen and discussed with Dr. Hermosillo

## 2022-05-14 NOTE — DIETITIAN INITIAL EVALUATION ADULT - PERTINENT MEDS FT
MEDICATIONS  (STANDING):  aMIOdarone Infusion 0.5 mG/Min (16.7 mL/Hr) IV Continuous <Continuous>  aMIOdarone Infusion 0.999 mG/Min (33.3 mL/Hr) IV Continuous <Continuous>  aMIOdarone Infusion 1 mG/Min (33.3 mL/Hr) IV Continuous <Continuous>  chlorhexidine 0.12% Liquid 15 milliLiter(s) Oral Mucosa every 12 hours  chlorhexidine 4% Liquid 1 Application(s) Topical <User Schedule>  dexMEDEtomidine Infusion 0.01 MICROgram(s)/kG/Hr (0.13 mL/Hr) IV Continuous <Continuous>  heparin   Injectable 5000 Unit(s) SubCutaneous every 8 hours  lactated ringers. 1000 milliLiter(s) (100 mL/Hr) IV Continuous <Continuous>  phenylephrine    Infusion 0.5 MICROgram(s)/kG/Min (9.9 mL/Hr) IV Continuous <Continuous>  piperacillin/tazobactam IVPB.. 3.375 Gram(s) IV Intermittent every 8 hours  propofol Infusion 10 MICROgram(s)/kG/Min (4.2 mL/Hr) IV Continuous <Continuous>    MEDICATIONS  (PRN):  acetaminophen     Tablet .. 650 milliGRAM(s) Oral every 6 hours PRN Temp greater or equal to 38C (100.4F), Mild Pain (1 - 3)

## 2022-05-14 NOTE — DIETITIAN INITIAL EVALUATION ADULT - PERTINENT LABORATORY DATA
05-14    139  |  103  |  26<H>  ----------------------------<  101<H>  3.6   |  23  |  0.87    Ca    9.3      14 May 2022 01:50  Phos  2.4     05-14  Mg     2.60     05-14    TPro  6.6  /  Alb  3.7  /  TBili  1.2  /  DBili  x   /  AST  40<H>  /  ALT  21  /  AlkPhos  73  05-14  A1C with Estimated Average Glucose Result: 5.9 % (05-14-22 @ 01:50)

## 2022-05-14 NOTE — PROGRESS NOTE ADULT - ATTENDING COMMENTS
Large left MCA stroke - H/O atrial fibrillation - cardioembolic vs. large artery atherosclerosis.   Very poor prognosis -  she is very likely to have severe aphasia and severe right hemiparesis.    Continued C discussions with family.   I explained in detail her prognosis to her son and grandson yesterday.   Thank you    There is a high probability of sudden, clinically significant, or life threatening deterioration in the patient’s condition which requires the highest level of physician preparedness to intervene urgently.  Critical care time:  30 minutes.

## 2022-05-14 NOTE — PROGRESS NOTE ADULT - SUBJECTIVE AND OBJECTIVE BOX
SUBJECTIVE/INTERVAL HISTORY: Pt made DNR overnight after discussion with family. Pt with atrial fibrillation overnight.    PAST MEDICAL & SURGICAL HISTORY:  S/P CABG x 1      Atrial fibrillation      Hypertension      Hyperlipemia      No significant past surgical history        FAMILY HISTORY:  No pertinent family history in first degree relatives        MEDICATIONS (HOME):  Home Medications:    MEDICATIONS  (STANDING):  aMIOdarone Infusion 0.5 mG/Min (16.7 mL/Hr) IV Continuous <Continuous>  aMIOdarone Infusion 0.999 mG/Min (33.3 mL/Hr) IV Continuous <Continuous>  aMIOdarone Infusion 1 mG/Min (33.3 mL/Hr) IV Continuous <Continuous>  chlorhexidine 0.12% Liquid 15 milliLiter(s) Oral Mucosa every 12 hours  chlorhexidine 4% Liquid 1 Application(s) Topical <User Schedule>  dexMEDEtomidine Infusion 0.01 MICROgram(s)/kG/Hr (0.13 mL/Hr) IV Continuous <Continuous>  lactated ringers. 1000 milliLiter(s) (100 mL/Hr) IV Continuous <Continuous>  phenylephrine    Infusion 0.5 MICROgram(s)/kG/Min (9.9 mL/Hr) IV Continuous <Continuous>  piperacillin/tazobactam IVPB.. 3.375 Gram(s) IV Intermittent every 8 hours  propofol Infusion 10 MICROgram(s)/kG/Min (4.2 mL/Hr) IV Continuous <Continuous>    MEDICATIONS  (PRN):  acetaminophen     Tablet .. 650 milliGRAM(s) Oral every 6 hours PRN Temp greater or equal to 38C (100.4F), Mild Pain (1 - 3)    ALLERGIES/INTOLERANCES:  Allergies  sulfa drugs (Unknown)    Intolerances    VITALS & EXAMINATION:  Vital Signs Last 24 Hrs  T(C): 38.2 (14 May 2022 08:00), Max: 38.2 (14 May 2022 08:00)  T(F): 100.8 (14 May 2022 08:00), Max: 100.8 (14 May 2022 08:00)  HR: 63 (14 May 2022 09:00) (59 - 207)  BP: 104/63 (14 May 2022 09:00) (76/44 - 174/111)  BP(mean): 71 (14 May 2022 09:00) (51 - 138)  RR: 17 (14 May 2022 09:00) (12 - 34)  SpO2: 97% (14 May 2022 09:00) (74% - 100%)    Physical Examination:  General: Intubated and sedated  HEENT: NCAT  Neurologic:  - Mental Status: Obtunded. Does not respond to voice or painful stimuli.  - Cranial Nerves: Visual fields - no BTT b/l; Pupils 4mm b/l, but reactive to light. Eyes in primary gaze with mild exotropia. Difficult to adequately assess facial symmetry, however appears to have R lower facial palsy.  - Motor: Non-purposeful arrhythmic movements in the LUE and LLE. No spontaneous movement in the RUE and spontaneous triple flexion in the RLE. No tremors  - Sensory: Withdrawal reflex to noxious stimuli in the L extremities  - Coordination: Patient is unable to complete task  - Gait: Patient is unable to complete task      LABORATORY:  CBC                       13.4   12.47 )-----------( 181      ( 14 May 2022 01:50 )             42.3     Chem 05-14    139  |  103  |  26<H>  ----------------------------<  101<H>  3.6   |  23  |  0.87    Ca    9.3      14 May 2022 01:50  Phos  2.4     05-14  Mg     2.60     05-14    TPro  6.6  /  Alb  3.7  /  TBili  1.2  /  DBili  x   /  AST  40<H>  /  ALT  21  /  AlkPhos  73  05-14    LFTs LIVER FUNCTIONS - ( 14 May 2022 01:50 )  Alb: 3.7 g/dL / Pro: 6.6 g/dL / ALK PHOS: 73 U/L / ALT: 21 U/L / AST: 40 U/L / GGT: x           Coagulopathy PT/INR - ( 13 May 2022 15:33 )   PT: 11.7 sec;   INR: 1.01 ratio         PTT - ( 13 May 2022 15:33 )  PTT:23.7 sec  Lipid Panel   A1c   Cardiac enzymes CARDIAC MARKERS ( 14 May 2022 01:50 )  x     / x     / 748 U/L / x     / x      CARDIAC MARKERS ( 13 May 2022 15:33 )  x     / x     / 1006 U/L / x     / 20.8 ng/mL      U/A Urinalysis Basic - ( 13 May 2022 17:43 )    Color: Yellow / Appearance: Slightly Turbid / S.023 / pH: x  Gluc: x / Ketone: Moderate  / Bili: Negative / Urobili: <2 mg/dL   Blood: x / Protein: 300 mg/dL / Nitrite: Negative   Leuk Esterase: Negative / RBC: 7 /HPF / WBC x   Sq Epi: x / Non Sq Epi: 4 /HPF / Bacteria: x        STUDIES & IMAGING:  Studies (EKG, EEG, EMG, etc):     Radiology (XR, CT, MR, U/S, TTE/DOLLY): SUBJECTIVE/INTERVAL HISTORY: Pt made DNR overnight after discussion with family. Pt with atrial fibrillation overnight.    PAST MEDICAL & SURGICAL HISTORY:  S/P CABG x 1      Atrial fibrillation      Hypertension      Hyperlipemia      No significant past surgical history        FAMILY HISTORY:  No pertinent family history in first degree relatives        MEDICATIONS (HOME):  Home Medications:    MEDICATIONS  (STANDING):  aMIOdarone Infusion 0.5 mG/Min (16.7 mL/Hr) IV Continuous <Continuous>  aMIOdarone Infusion 0.999 mG/Min (33.3 mL/Hr) IV Continuous <Continuous>  aMIOdarone Infusion 1 mG/Min (33.3 mL/Hr) IV Continuous <Continuous>  chlorhexidine 0.12% Liquid 15 milliLiter(s) Oral Mucosa every 12 hours  chlorhexidine 4% Liquid 1 Application(s) Topical <User Schedule>  dexMEDEtomidine Infusion 0.01 MICROgram(s)/kG/Hr (0.13 mL/Hr) IV Continuous <Continuous>  lactated ringers. 1000 milliLiter(s) (100 mL/Hr) IV Continuous <Continuous>  phenylephrine    Infusion 0.5 MICROgram(s)/kG/Min (9.9 mL/Hr) IV Continuous <Continuous>  piperacillin/tazobactam IVPB.. 3.375 Gram(s) IV Intermittent every 8 hours  propofol Infusion 10 MICROgram(s)/kG/Min (4.2 mL/Hr) IV Continuous <Continuous>    MEDICATIONS  (PRN):  acetaminophen     Tablet .. 650 milliGRAM(s) Oral every 6 hours PRN Temp greater or equal to 38C (100.4F), Mild Pain (1 - 3)    ALLERGIES/INTOLERANCES:  Allergies  sulfa drugs (Unknown)    Intolerances    VITALS & EXAMINATION:  Vital Signs Last 24 Hrs  T(C): 38.2 (14 May 2022 08:00), Max: 38.2 (14 May 2022 08:00)  T(F): 100.8 (14 May 2022 08:00), Max: 100.8 (14 May 2022 08:00)  HR: 63 (14 May 2022 09:00) (59 - 207)  BP: 104/63 (14 May 2022 09:00) (76/44 - 174/111)  BP(mean): 71 (14 May 2022 09:00) (51 - 138)  RR: 17 (14 May 2022 09:00) (12 - 34)  SpO2: 97% (14 May 2022 09:00) (74% - 100%)    Physical Examination:  General: Intubated and sedated  HEENT: NCAT  Neurologic:  - Mental Status: Obtunded. Does not respond to voice or painful stimuli.  - Cranial Nerves: Visual fields - no BTT b/l; Pupils 4mm b/l, but reactive to light. Eyes in primary gaze with mild exotropia. Difficult to adequately assess facial symmetry, however appears to have R lower facial palsy.  - Motor: Non-purposeful arrhythmic movements in the LUE and LLE. No spontaneous movement in the RUE and spontaneous reflex triple flexion in the RLE. No tremors  - Sensory: Withdrawal reflex to noxious stimuli in the L extremities  - Coordination: Patient is unable to complete task  - Gait: Patient is unable to complete task      LABORATORY:  CBC                       13.4   12.47 )-----------( 181      ( 14 May 2022 01:50 )             42.3     Chem 05-14    139  |  103  |  26<H>  ----------------------------<  101<H>  3.6   |  23  |  0.87    Ca    9.3      14 May 2022 01:50  Phos  2.4     05-14  Mg     2.60     05-14    TPro  6.6  /  Alb  3.7  /  TBili  1.2  /  DBili  x   /  AST  40<H>  /  ALT  21  /  AlkPhos  73  05-14    LFTs LIVER FUNCTIONS - ( 14 May 2022 01:50 )  Alb: 3.7 g/dL / Pro: 6.6 g/dL / ALK PHOS: 73 U/L / ALT: 21 U/L / AST: 40 U/L / GGT: x           Coagulopathy PT/INR - ( 13 May 2022 15:33 )   PT: 11.7 sec;   INR: 1.01 ratio         PTT - ( 13 May 2022 15:33 )  PTT:23.7 sec  Lipid Panel   A1c   Cardiac enzymes CARDIAC MARKERS ( 14 May 2022 01:50 )  x     / x     / 748 U/L / x     / x      CARDIAC MARKERS ( 13 May 2022 15:33 )  x     / x     / 1006 U/L / x     / 20.8 ng/mL      U/A Urinalysis Basic - ( 13 May 2022 17:43 )    Color: Yellow / Appearance: Slightly Turbid / S.023 / pH: x  Gluc: x / Ketone: Moderate  / Bili: Negative / Urobili: <2 mg/dL   Blood: x / Protein: 300 mg/dL / Nitrite: Negative   Leuk Esterase: Negative / RBC: 7 /HPF / WBC x   Sq Epi: x / Non Sq Epi: 4 /HPF / Bacteria: x        STUDIES & IMAGING:  Studies (EKG, EEG, EMG, etc):     Radiology (XR, CT, MR, U/S, TTE/DOLLY):

## 2022-05-14 NOTE — DIETITIAN INITIAL EVALUATION ADULT - NSFNSPHYEXAMSKINFT_GEN_A_CORE
Pressure Injury 1: Right:,hip, Stage II  Pressure Injury 2: none, none  Pressure Injury 3: none, none  Pressure Injury 4: none, none  Pressure Injury 5: none, none  Pressure Injury 6: none, none  Pressure Injury 7: none, none  Pressure Injury 8: none, none  Pressure Injury 9: none, none  Pressure Injury 10: none, none  Pressure Injury 11: none, none

## 2022-05-14 NOTE — DIETITIAN INITIAL EVALUATION ADULT - NS FNS DIET ORDER
Diet, NPO:   Except Medications     Special Instructions for Nursing:  Except Medications (05-13-22 @ 17:04)

## 2022-05-14 NOTE — CHART NOTE - NSCHARTNOTEFT_GEN_A_CORE
Tele reviewed, pt remains in afib with PVC. No further sustained VT.    Chester Haji PGY6  All Cardiology service information can be found 24/7 on [email or web address], password: [email or web address] Tele reviewed, pt remains in afib with PVC. No further sustained VT. c/w IV amio load, will determine further EP work-up based on neurological/functional recovery.     Chester Haji PGY6  All Cardiology service information can be found 24/7 on [email or web address], password: [email or web address]

## 2022-05-14 NOTE — PROGRESS NOTE ADULT - ATTENDING COMMENTS
89 year old female with AF on Xarelto, CAD s/p CABG presents after being found unresponsive at home for unclear amount of time found to have subacute L MCA infarct, intubated for airway protection, and had sustained VT started on amiodarone in ED admitted to ICU.   - Transition of propofol and transition to Precedex.   - Follow up neurology.  - On neuro exam, pupils responsive and moving all extremities spontaneously except the RUL which she does move to pain.  - SAT and SBT as tolerated.   - Continue amiodarone. Follow up EP and neurology recommendations.   - Possible aspiration with patchy opacities on CT. Continue Zosyn.  - Patient is DNR  - Prognosis guarded. 89 year old female with AF on Xarelto, CAD s/p CABG presents after being found unresponsive at home for unclear amount of time found to have subacute L MCA infarct, intubated for airway protection, and had sustained VT started on amiodarone in ED admitted to ICU.   - Transition of propofol and transition to Precedex.   - Follow up neurology.  - On neuro exam, pupils responsive and moving all extremities spontaneously except the RUL which she does move to pain.  - SAT and SBT as tolerated.   - Continue amiodarone. Follow up EP and neurology recommendations.   - Check TTE  - Possible aspiration with patchy opacities on CT. Continue Zosyn.  - Patient is DNR  - Prognosis guarded.

## 2022-05-14 NOTE — PROGRESS NOTE ADULT - SUBJECTIVE AND OBJECTIVE BOX
Interval Events:    REVIEW OF SYSTEMS:  [x] Unable to assess ROS because intubated/sedated    OBJECTIVE:  ICU Vital Signs Last 24 Hrs  T(C): 37.8 (14 May 2022 04:00), Max: 37.8 (14 May 2022 04:00)  T(F): 100 (14 May 2022 04:00), Max: 100 (14 May 2022 04:00)  HR: 60 (14 May 2022 07:00) (59 - 207)  BP: 113/52 (14 May 2022 07:00) (76/44 - 174/111)  BP(mean): 67 (14 May 2022 07:00) (51 - 138)  ABP: --  ABP(mean): --  RR: 16 (14 May 2022 07:00) (12 - 34)  SpO2: 100% (14 May 2022 07:00) (74% - 100%)    Mode: AC/ CMV (Assist Control/ Continuous Mandatory Ventilation), RR (machine): 16, TV (machine): 400, FiO2: 30, PEEP: 5, ITime: 0.71, MAP: 10, PIP: 25    05-13 @ 07:01  -  -14 @ 07:00  --------------------------------------------------------  IN: 2699.7 mL / OUT: 600 mL / NET: 2099.7 mL      CAPILLARY BLOOD GLUCOSE      PHYSICAL EXAM:  Gen: Intubated, sedated   Head: NCAT  HEENT: EOMI, oral mucosa moist, normal conjunctiva  Lung: CTAB, course breath sounds   CV: RRR, no murmurs, rubs or gallops, no le edema, no jvd   Abd: soft, NTND, no guarding, no CVA tenderness  MSK: no visible deformities  Neuro: No focal sensory or motor deficits, normal CN exam   Skin: Warm, well perfused, no rash  Psych: normal affect.    LINES:    HOSPITAL MEDICATIONS:  Standing Meds:  aMIOdarone Infusion 1 mG/Min IV Continuous <Continuous>  aMIOdarone Infusion 0.5 mG/Min IV Continuous <Continuous>  aMIOdarone Infusion 0.999 mG/Min IV Continuous <Continuous>  chlorhexidine 0.12% Liquid 15 milliLiter(s) Oral Mucosa every 12 hours  chlorhexidine 4% Liquid 1 Application(s) Topical <User Schedule>  lactated ringers. 1000 milliLiter(s) IV Continuous <Continuous>  phenylephrine    Infusion 0.5 MICROgram(s)/kG/Min IV Continuous <Continuous>  piperacillin/tazobactam IVPB.. 3.375 Gram(s) IV Intermittent every 8 hours  propofol Infusion 10 MICROgram(s)/kG/Min IV Continuous <Continuous>      PRN Meds:      LABS:                        13.4   12.47 )-----------( 181      ( 14 May 2022 01:50 )             42.3     Hgb Trend: 13.4<--, 14.6<--  0514    139  |  103  |  26<H>  ----------------------------<  101<H>  3.6   |  23  |  0.87    Ca    9.3      14 May 2022 01:50  Phos  2.4       Mg     2.60         TPro  6.6  /  Alb  3.7  /  TBili  1.2  /  DBili  x   /  AST  40<H>  /  ALT  21  /  AlkPhos  73      Creatinine Trend: 0.87<--, 0.61<--  PT/INR - ( 13 May 2022 15:33 )   PT: 11.7 sec;   INR: 1.01 ratio         PTT - ( 13 May 2022 15:33 )  PTT:23.7 sec  Urinalysis Basic - ( 13 May 2022 17:43 )    Color: Yellow / Appearance: Slightly Turbid / S.023 / pH: x  Gluc: x / Ketone: Moderate  / Bili: Negative / Urobili: <2 mg/dL   Blood: x / Protein: 300 mg/dL / Nitrite: Negative   Leuk Esterase: Negative / RBC: 7 /HPF / WBC x   Sq Epi: x / Non Sq Epi: 4 /HPF / Bacteria: x      Venous Blood Gas:   @ 01:50  7.43/40/37/26/59.5  VBG Lactate: 2.3  Venous Blood Gas:   @ 15:33  7.30/51/32/25/42.5  VBG Lactate: 4.2      MICROBIOLOGY:     RADIOLOGY:  [ ] Reviewed and interpreted by me    EKG: Interval Events:  Pt intubated/sedated, afib overnight, started on amio gtt, w/ stable HR overnight. No other acute events.     REVIEW OF SYSTEMS:  [x] Unable to assess ROS because intubated/sedated    OBJECTIVE:  ICU Vital Signs Last 24 Hrs  T(C): 37.8 (14 May 2022 04:00), Max: 37.8 (14 May 2022 04:00)  T(F): 100 (14 May 2022 04:00), Max: 100 (14 May 2022 04:00)  HR: 60 (14 May 2022 07:00) (59 - 207)  BP: 113/52 (14 May 2022 07:00) (76/44 - 174/111)  BP(mean): 67 (14 May 2022 07:00) (51 - 138)  ABP: --  ABP(mean): --  RR: 16 (14 May 2022 07:00) (12 - 34)  SpO2: 100% (14 May 2022 07:00) (74% - 100%)    Mode: AC/ CMV (Assist Control/ Continuous Mandatory Ventilation), RR (machine): 16, TV (machine): 400, FiO2: 30, PEEP: 5, ITime: 0.71, MAP: 10, PIP: 25    05-13 @ 07:01  -  05-14 @ 07:00  --------------------------------------------------------  IN: 2699.7 mL / OUT: 600 mL / NET: 2099.7 mL      CAPILLARY BLOOD GLUCOSE      PHYSICAL EXAM:  Gen: Intubated, sedated   Head: NCAT  HEENT: EOMI, oral mucosa moist, normal conjunctiva  Lung: CTAB, course breath sounds   CV: RRR, no murmurs, rubs or gallops, no le edema, no jvd   Abd: soft, NTND, no guarding, no CVA tenderness  MSK: no visible deformities  Neuro: No focal sensory or motor deficits, normal CN exam   Skin: Warm, well perfused, no rash  Psych: normal affect.    LINES:    HOSPITAL MEDICATIONS:  Standing Meds:  aMIOdarone Infusion 1 mG/Min IV Continuous <Continuous>  aMIOdarone Infusion 0.5 mG/Min IV Continuous <Continuous>  aMIOdarone Infusion 0.999 mG/Min IV Continuous <Continuous>  chlorhexidine 0.12% Liquid 15 milliLiter(s) Oral Mucosa every 12 hours  chlorhexidine 4% Liquid 1 Application(s) Topical <User Schedule>  lactated ringers. 1000 milliLiter(s) IV Continuous <Continuous>  phenylephrine    Infusion 0.5 MICROgram(s)/kG/Min IV Continuous <Continuous>  piperacillin/tazobactam IVPB.. 3.375 Gram(s) IV Intermittent every 8 hours  propofol Infusion 10 MICROgram(s)/kG/Min IV Continuous <Continuous>      PRN Meds:      LABS:                        13.4   12.47 )-----------( 181      ( 14 May 2022 01:50 )             42.3     Hgb Trend: 13.4<--, 14.6<--  14    139  |  103  |  26<H>  ----------------------------<  101<H>  3.6   |  23  |  0.87    Ca    9.3      14 May 2022 01:50  Phos  2.4       Mg     2.60         TPro  6.6  /  Alb  3.7  /  TBili  1.2  /  DBili  x   /  AST  40<H>  /  ALT  21  /  AlkPhos  73      Creatinine Trend: 0.87<--, 0.61<--  PT/INR - ( 13 May 2022 15:33 )   PT: 11.7 sec;   INR: 1.01 ratio         PTT - ( 13 May 2022 15:33 )  PTT:23.7 sec  Urinalysis Basic - ( 13 May 2022 17:43 )    Color: Yellow / Appearance: Slightly Turbid / S.023 / pH: x  Gluc: x / Ketone: Moderate  / Bili: Negative / Urobili: <2 mg/dL   Blood: x / Protein: 300 mg/dL / Nitrite: Negative   Leuk Esterase: Negative / RBC: 7 /HPF / WBC x   Sq Epi: x / Non Sq Epi: 4 /HPF / Bacteria: x      Venous Blood Gas:   @ 01:50  7.43/40/37/26/59.5  VBG Lactate: 2.3  Venous Blood Gas:   @ 15:33  7.30/51/32/25/42.5  VBG Lactate: 4.2      MICROBIOLOGY:     RADIOLOGY:  [ ] Reviewed and interpreted by me    EKG: Interval Events:  Pt intubated/sedated, afib overnight, started on amio gtt, w/ stable HR overnight. Non-purposeful leg and arm movements on propofol, No other acute events.     REVIEW OF SYSTEMS:  [x] Unable to assess ROS because intubated/sedated    OBJECTIVE:  ICU Vital Signs Last 24 Hrs  T(C): 37.8 (14 May 2022 04:00), Max: 37.8 (14 May 2022 04:00)  T(F): 100 (14 May 2022 04:00), Max: 100 (14 May 2022 04:00)  HR: 60 (14 May 2022 07:00) (59 - 207)  BP: 113/52 (14 May 2022 07:00) (76/44 - 174/111)  BP(mean): 67 (14 May 2022 07:00) (51 - 138)  ABP: --  ABP(mean): --  RR: 16 (14 May 2022 07:00) (12 - 34)  SpO2: 100% (14 May 2022 07:00) (74% - 100%)    Mode: AC/ CMV (Assist Control/ Continuous Mandatory Ventilation), RR (machine): 16, TV (machine): 400, FiO2: 30, PEEP: 5, ITime: 0.71, MAP: 10, PIP: 25    05-13 @ 07:01  -  05-14 @ 07:00  --------------------------------------------------------  IN: 2699.7 mL / OUT: 600 mL / NET: 2099.7 mL      CAPILLARY BLOOD GLUCOSE      PHYSICAL EXAM:  Gen: Intubated, sedated   Head: NCAT  HEENT: EOMI, oral mucosa moist, normal conjunctiva  Lung: CTAB, course breath sounds   CV: RRR, no murmurs, rubs or gallops, no le edema, no jvd   Abd: soft, NTND, no guarding, no CVA tenderness  MSK: no visible deformities  Neuro: RLE weakness  Skin: Warm, well perfused, no rash  Psych: normal affect.    LINES:    HOSPITAL MEDICATIONS:  Standing Meds:  aMIOdarone Infusion 1 mG/Min IV Continuous <Continuous>  aMIOdarone Infusion 0.5 mG/Min IV Continuous <Continuous>  aMIOdarone Infusion 0.999 mG/Min IV Continuous <Continuous>  chlorhexidine 0.12% Liquid 15 milliLiter(s) Oral Mucosa every 12 hours  chlorhexidine 4% Liquid 1 Application(s) Topical <User Schedule>  lactated ringers. 1000 milliLiter(s) IV Continuous <Continuous>  phenylephrine    Infusion 0.5 MICROgram(s)/kG/Min IV Continuous <Continuous>  piperacillin/tazobactam IVPB.. 3.375 Gram(s) IV Intermittent every 8 hours  propofol Infusion 10 MICROgram(s)/kG/Min IV Continuous <Continuous>      PRN Meds:      LABS:                        13.4   12.47 )-----------( 181      ( 14 May 2022 01:50 )             42.3     Hgb Trend: 13.4<--, 14.6<--  0514    139  |  103  |  26<H>  ----------------------------<  101<H>  3.6   |  23  |  0.87    Ca    9.3      14 May 2022 01:50  Phos  2.4       Mg     2.60         TPro  6.6  /  Alb  3.7  /  TBili  1.2  /  DBili  x   /  AST  40<H>  /  ALT  21  /  AlkPhos  73      Creatinine Trend: 0.87<--, 0.61<--  PT/INR - ( 13 May 2022 15:33 )   PT: 11.7 sec;   INR: 1.01 ratio         PTT - ( 13 May 2022 15:33 )  PTT:23.7 sec  Urinalysis Basic - ( 13 May 2022 17:43 )    Color: Yellow / Appearance: Slightly Turbid / S.023 / pH: x  Gluc: x / Ketone: Moderate  / Bili: Negative / Urobili: <2 mg/dL   Blood: x / Protein: 300 mg/dL / Nitrite: Negative   Leuk Esterase: Negative / RBC: 7 /HPF / WBC x   Sq Epi: x / Non Sq Epi: 4 /HPF / Bacteria: x      Venous Blood Gas:   @ 01:50  7.43/40/37/26/59.5  VBG Lactate: 2.3  Venous Blood Gas:   @ 15:33  7.30/51/32/25/42.5  VBG Lactate: 4.2      MICROBIOLOGY:     RADIOLOGY:  [ ] Reviewed and interpreted by me    EKG:

## 2022-05-14 NOTE — PROGRESS NOTE ADULT - ASSESSMENT
Patient is a 89 y/o F w/ PMHx HTN, HLD, Afib on Xarelto, CAD s/p CABG presenting to the ED after being found unresponsive at home. CT head showed large subacute left MCA territory infarct without midline shift or mass effect concerning for CVA; Transferred to MICU for further management.       Neuro   - Intubated, sedated  - On propofol     #CVA  - CT non-Con: Large subacute left MCA territory infarct without significant mass effect or midline shift  - Presentation concerning for ischemic stroke   - F/U MRI if within GOC   - Hold ASA  - Neurology following, recs appreciated       CV  - /88    #Afib RVR  - In the ED, found to be in Afib RVR with rates to >160  - Home meds: Pending med rec from patient family   - Started on Amio gtt in the ED, now sinus   - Has AICD in place; will need interrogation   - CHADSVASC > 2, Patient previously on Xarelto   - hold A/C at this time in setting of recent CVA to avoid hemorraghic conversion       #CAD s/p CABG   - No recorded echo on file   - No indication for permissive hypertension per neuro   - F/U POCUS to assess volume status and LV function; if abnormal can order formal echo       Respiratory   - Intubated, volume control   - ABG: pH 7.3 Co2 51 O2 32 HCO3 25   - F/U ABG, titrate vent settings       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 CT head showed large subacute left MCA territory infarct without midline shift or mass effect concerning for CVA; Transferred to MICU for further management.       Neuro   - Intubated, sedated  - On propofol     #CVA  - CT non-Con: Large subacute left MCA territory infarct without significant mass effect or midline shift  - Presentation concerning for ischemic stroke   - F/U MRI if within GOC   - Hold ASA  - Neurology following, recs appreciated       CV  - On john gtt likely 2/2 sedation    #Afib RVR  - In the ED, found to be in Afib RVR with rates to >160  - Home meds: Pending med rec from patient family   - Started on Amio gtt in the ED, now sinus   - Has AICD in place; will need interrogation   - CHADSVASC > 2, Patient previously on Xarelto   - hold A/C at this time in setting of recent CVA to avoid hemorraghic conversion       #CAD s/p CABG   - No recorded echo on file   - No indication for permissive hypertension per neuro   - F/U POCUS to assess volume status and LV function; if abnormal can order formal echo       Respiratory   - Intubated, volume control   - ABG: pH 7.3 Co2 51 O2 32 HCO3 25   - F/U ABG, titrate vent settings       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 CT head showed large subacute left MCA territory infarct without midline shift or mass effect concerning for CVA; Transferred to MICU for further management.       Neuro   - Intubated, sedated  - On propofol     #CVA  - CT non-Con: Large subacute left MCA territory infarct without significant mass effect or midline shift  - Presentation concerning for ischemic stroke   - F/U MRI if within GOC   - Hold ASA  - Neurology following, recs appreciated       CV  - On john gtt likely 2/2 sedation    #Afib RVR  - In the ED, found to be in Afib RVR with rates to >160  - Home meds: Pending med rec from patient family   - Started on Amio gtt in the ED, now sinus   - Has AICD in place; will need interrogation   - CHADSVASC > 2, Patient previously on Xarelto   - hold A/C at this time in setting of recent CVA to avoid hemorraghic conversion       #CAD s/p CABG   - No recorded echo on file   - No indication for permissive hypertension per neuro   - F/U POCUS to assess volume status and LV function; if abnormal can order formal echo       Respiratory   - Intubated, volume control   - ABG: pH 7.3 Co2 51 O2 32 HCO3 25   - F/U ABG, titrate vent settings       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 CT head showed large subacute left MCA territory infarct without midline shift or mass effect concerning for CVA; Transferred to MICU for further management.       Neuro   - Intubated, sedated  - On propofol     #CVA  - CT non-Con: Large subacute left MCA territory infarct without significant mass effect or midline shift  - Presentation concerning for ischemic stroke   - F/U MRI if within GOC   - Hold ASA  - Neurology following, recs appreciated       CV  - On john gtt likely 2/2 sedation    #Afib RVR  - In the ED, found to be in Afib RVR with rates to >160  - Home meds: Pending med rec from patient family   - Started on Amio gtt in the ED, now sinus   - Has AICD in place; will need interrogation   - CHADSVASC > 2, Patient previously on Xarelto   - hold A/C at this time in setting of recent CVA to avoid hemorraghic conversion     #CAD s/p CABG   - No recorded echo on file   - No indication for permissive hypertension per neuro   - F/U POCUS to assess volume status and LV function  - TTE w/ dilated LA, mod pulm htn, mitral regurg      Respiratory   - Intubated, volume control   - ABG: pH 7.3 Co2 51 O2 32 HCO3 25   - F/U ABG, titrate vent settings       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

## 2022-05-14 NOTE — DIETITIAN INITIAL EVALUATION ADULT - OTHER INFO
89 y/o female admitted with AMS with large CVA. Pt intubated and sedated on precedex and propofol. Propofol contributing approx 113 non-nutritive lioid calories over the past 24 hrs - being weaned down with rate now 1.6 mL/hr. Pt NPO receiving IV pf LR and IVPB fluids. No GI distress noted. Nutrition consult ordered for Stage II R hip pressure injury which confers a higher nutrition need. Request nutrition plan of care as part of GOC discussion with family as prognosis is guarded per MICU Attng. RDN services to remain available as needed.

## 2022-05-15 LAB
ALBUMIN SERPL ELPH-MCNC: 2.9 G/DL — LOW (ref 3.3–5)
ALP SERPL-CCNC: 63 U/L — SIGNIFICANT CHANGE UP (ref 40–120)
ALT FLD-CCNC: 20 U/L — SIGNIFICANT CHANGE UP (ref 4–33)
ANION GAP SERPL CALC-SCNC: 11 MMOL/L — SIGNIFICANT CHANGE UP (ref 7–14)
AST SERPL-CCNC: 26 U/L — SIGNIFICANT CHANGE UP (ref 4–32)
BASOPHILS # BLD AUTO: 0.03 K/UL — SIGNIFICANT CHANGE UP (ref 0–0.2)
BASOPHILS NFR BLD AUTO: 0.3 % — SIGNIFICANT CHANGE UP (ref 0–2)
BILIRUB SERPL-MCNC: 0.9 MG/DL — SIGNIFICANT CHANGE UP (ref 0.2–1.2)
BUN SERPL-MCNC: 16 MG/DL — SIGNIFICANT CHANGE UP (ref 7–23)
CALCIUM SERPL-MCNC: 8.1 MG/DL — LOW (ref 8.4–10.5)
CHLORIDE SERPL-SCNC: 107 MMOL/L — SIGNIFICANT CHANGE UP (ref 98–107)
CO2 SERPL-SCNC: 21 MMOL/L — LOW (ref 22–31)
CREAT SERPL-MCNC: 0.78 MG/DL — SIGNIFICANT CHANGE UP (ref 0.5–1.3)
EGFR: 72 ML/MIN/1.73M2 — SIGNIFICANT CHANGE UP
EOSINOPHIL # BLD AUTO: 0.01 K/UL — SIGNIFICANT CHANGE UP (ref 0–0.5)
EOSINOPHIL NFR BLD AUTO: 0.1 % — SIGNIFICANT CHANGE UP (ref 0–6)
GLUCOSE SERPL-MCNC: 110 MG/DL — HIGH (ref 70–99)
HCT VFR BLD CALC: 36.6 % — SIGNIFICANT CHANGE UP (ref 34.5–45)
HGB BLD-MCNC: 11.7 G/DL — SIGNIFICANT CHANGE UP (ref 11.5–15.5)
IANC: 6.79 K/UL — SIGNIFICANT CHANGE UP (ref 1.8–7.4)
IMM GRANULOCYTES NFR BLD AUTO: 0.6 % — SIGNIFICANT CHANGE UP (ref 0–1.5)
LYMPHOCYTES # BLD AUTO: 1.09 K/UL — SIGNIFICANT CHANGE UP (ref 1–3.3)
LYMPHOCYTES # BLD AUTO: 12.3 % — LOW (ref 13–44)
MAGNESIUM SERPL-MCNC: 2.2 MG/DL — SIGNIFICANT CHANGE UP (ref 1.6–2.6)
MCHC RBC-ENTMCNC: 29.8 PG — SIGNIFICANT CHANGE UP (ref 27–34)
MCHC RBC-ENTMCNC: 32 GM/DL — SIGNIFICANT CHANGE UP (ref 32–36)
MCV RBC AUTO: 93.1 FL — SIGNIFICANT CHANGE UP (ref 80–100)
MONOCYTES # BLD AUTO: 0.89 K/UL — SIGNIFICANT CHANGE UP (ref 0–0.9)
MONOCYTES NFR BLD AUTO: 10 % — SIGNIFICANT CHANGE UP (ref 2–14)
NEUTROPHILS # BLD AUTO: 6.79 K/UL — SIGNIFICANT CHANGE UP (ref 1.8–7.4)
NEUTROPHILS NFR BLD AUTO: 76.7 % — SIGNIFICANT CHANGE UP (ref 43–77)
NRBC # BLD: 0 /100 WBCS — SIGNIFICANT CHANGE UP
NRBC # FLD: 0 K/UL — SIGNIFICANT CHANGE UP
PHOSPHATE SERPL-MCNC: 2.1 MG/DL — LOW (ref 2.5–4.5)
PLATELET # BLD AUTO: 132 K/UL — LOW (ref 150–400)
POTASSIUM SERPL-MCNC: 3.2 MMOL/L — LOW (ref 3.5–5.3)
POTASSIUM SERPL-SCNC: 3.2 MMOL/L — LOW (ref 3.5–5.3)
PROT SERPL-MCNC: 5.6 G/DL — LOW (ref 6–8.3)
RBC # BLD: 3.93 M/UL — SIGNIFICANT CHANGE UP (ref 3.8–5.2)
RBC # FLD: 17.2 % — HIGH (ref 10.3–14.5)
SODIUM SERPL-SCNC: 139 MMOL/L — SIGNIFICANT CHANGE UP (ref 135–145)
WBC # BLD: 8.86 K/UL — SIGNIFICANT CHANGE UP (ref 3.8–10.5)
WBC # FLD AUTO: 8.86 K/UL — SIGNIFICANT CHANGE UP (ref 3.8–10.5)

## 2022-05-15 PROCEDURE — 99233 SBSQ HOSP IP/OBS HIGH 50: CPT

## 2022-05-15 PROCEDURE — 99291 CRITICAL CARE FIRST HOUR: CPT

## 2022-05-15 RX ORDER — PHENYLEPHRINE HYDROCHLORIDE 10 MG/ML
1 INJECTION INTRAVENOUS
Qty: 40 | Refills: 0 | Status: DISCONTINUED | OUTPATIENT
Start: 2022-05-15 | End: 2022-05-16

## 2022-05-15 RX ORDER — POTASSIUM CHLORIDE 20 MEQ
10 PACKET (EA) ORAL
Refills: 0 | Status: COMPLETED | OUTPATIENT
Start: 2022-05-15 | End: 2022-05-15

## 2022-05-15 RX ORDER — AMIODARONE HYDROCHLORIDE 400 MG/1
200 TABLET ORAL DAILY
Refills: 0 | Status: DISCONTINUED | OUTPATIENT
Start: 2022-05-15 | End: 2022-05-20

## 2022-05-15 RX ORDER — POTASSIUM PHOSPHATE, MONOBASIC POTASSIUM PHOSPHATE, DIBASIC 236; 224 MG/ML; MG/ML
30 INJECTION, SOLUTION INTRAVENOUS ONCE
Refills: 0 | Status: COMPLETED | OUTPATIENT
Start: 2022-05-15 | End: 2022-05-15

## 2022-05-15 RX ADMIN — CHLORHEXIDINE GLUCONATE 15 MILLILITER(S): 213 SOLUTION TOPICAL at 17:13

## 2022-05-15 RX ADMIN — Medication 100 MILLIEQUIVALENT(S): at 05:32

## 2022-05-15 RX ADMIN — DEXMEDETOMIDINE HYDROCHLORIDE IN 0.9% SODIUM CHLORIDE 0.13 MICROGRAM(S)/KG/HR: 4 INJECTION INTRAVENOUS at 09:36

## 2022-05-15 RX ADMIN — PHENYLEPHRINE HYDROCHLORIDE 19.8 MICROGRAM(S)/KG/MIN: 10 INJECTION INTRAVENOUS at 19:33

## 2022-05-15 RX ADMIN — Medication 650 MILLIGRAM(S): at 00:58

## 2022-05-15 RX ADMIN — HEPARIN SODIUM 5000 UNIT(S): 5000 INJECTION INTRAVENOUS; SUBCUTANEOUS at 21:21

## 2022-05-15 RX ADMIN — Medication 3 MILLIGRAM(S): at 13:16

## 2022-05-15 RX ADMIN — Medication 650 MILLIGRAM(S): at 01:28

## 2022-05-15 RX ADMIN — CHLORHEXIDINE GLUCONATE 15 MILLILITER(S): 213 SOLUTION TOPICAL at 06:16

## 2022-05-15 RX ADMIN — PIPERACILLIN AND TAZOBACTAM 25 GRAM(S): 4; .5 INJECTION, POWDER, LYOPHILIZED, FOR SOLUTION INTRAVENOUS at 13:16

## 2022-05-15 RX ADMIN — CHLORHEXIDINE GLUCONATE 1 APPLICATION(S): 213 SOLUTION TOPICAL at 06:16

## 2022-05-15 RX ADMIN — Medication 650 MILLIGRAM(S): at 18:00

## 2022-05-15 RX ADMIN — AMIODARONE HYDROCHLORIDE 200 MILLIGRAM(S): 400 TABLET ORAL at 06:15

## 2022-05-15 RX ADMIN — HEPARIN SODIUM 5000 UNIT(S): 5000 INJECTION INTRAVENOUS; SUBCUTANEOUS at 13:15

## 2022-05-15 RX ADMIN — DEXMEDETOMIDINE HYDROCHLORIDE IN 0.9% SODIUM CHLORIDE 0.13 MICROGRAM(S)/KG/HR: 4 INJECTION INTRAVENOUS at 19:33

## 2022-05-15 RX ADMIN — Medication 100 MILLIEQUIVALENT(S): at 04:47

## 2022-05-15 RX ADMIN — Medication 650 MILLIGRAM(S): at 17:13

## 2022-05-15 RX ADMIN — PIPERACILLIN AND TAZOBACTAM 25 GRAM(S): 4; .5 INJECTION, POWDER, LYOPHILIZED, FOR SOLUTION INTRAVENOUS at 04:43

## 2022-05-15 RX ADMIN — HEPARIN SODIUM 5000 UNIT(S): 5000 INJECTION INTRAVENOUS; SUBCUTANEOUS at 06:16

## 2022-05-15 RX ADMIN — POTASSIUM PHOSPHATE, MONOBASIC POTASSIUM PHOSPHATE, DIBASIC 83.33 MILLIMOLE(S): 236; 224 INJECTION, SOLUTION INTRAVENOUS at 06:17

## 2022-05-15 RX ADMIN — PHENYLEPHRINE HYDROCHLORIDE 9.9 MICROGRAM(S)/KG/MIN: 10 INJECTION INTRAVENOUS at 09:36

## 2022-05-15 RX ADMIN — PIPERACILLIN AND TAZOBACTAM 25 GRAM(S): 4; .5 INJECTION, POWDER, LYOPHILIZED, FOR SOLUTION INTRAVENOUS at 21:20

## 2022-05-15 RX ADMIN — Medication 100 MILLIEQUIVALENT(S): at 06:53

## 2022-05-15 NOTE — PROGRESS NOTE ADULT - ASSESSMENT
Patient is a 89 y/o F w/ PMHx HTN, HLD, Afib on Xarelto, CAD s/p CABG presenting to the ED after being found unresponsive at home. CT head showed large subacute left MCA territory infarct without midline shift or mass effect concerning for CVA; Transferred to MICU for further management.       Neuro   - Intubated, sedated  - On propofol continue to wean, utilize precedex    #CVA  - CT non-Con: Large subacute left MCA territory infarct without significant mass effect or midline shift  -  Presentation concerning for ischemic stroke   - F/U MRI if within GOC   - Hold ASA  - Neurology following, recs appreciated     CV  #Shock 2/2 neurogenic from sedation likely.  - On john Gtt    #Afib RVR  - In the ED, found to be in Afib RVR with rates to >160  - Home meds: Pending med rec from patient family   - Started on Amio gtt in the ED, now sinus on PO amio  - AICD interrogated with episodes of VT with rapid VR to 200s on 5/13/2022 noted  - CHADSVASC > 2, Patient previously on Xarelto   - Hold A/C at this time in setting of recent large CVA to avoid hemorraghic conversion     #CAD s/p CABG   - No recorded echo on file   - No indication for permissive hypertension per neuro   - F/U POCUS to assess volume status and LV function  - TTE w/ dilated LA, mod pulm htn, mitral regurg      Respiratory   - Intubated, volume control   - ABG: pH 7.3 Co2 51 O2 32 HCO3 25   - F/U ABG, titrate vent settings       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 CT head showed large subacute left MCA territory infarct without midline shift or mass effect concerning for CVA; Transferred to MICU for further management.       Neuro   - Intubated, sedated  - On propofol continue to wean, utilize precedex.    #Benzo dependence  - See ISTOP note. At home was taking tramadol and ativan. Was taking 3mg Ativan QD. Will resume here to avoid withdrawal.    #CVA  - CT non-Con: Large subacute left MCA territory infarct without significant mass effect or midline shift  -  Presentation concerning for ischemic stroke   - Hold ASA  - Neurology following, recs appreciated     CV  #Shock 2/2 neurogenic from sedation likely.  - On john Gtt    #Afib RVR  - In the ED, found to be in Afib RVR with rates to >160  - Home meds: Pending med rec from patient family   - Started on Amio gtt in the ED, now sinus on PO amio  - AICD interrogated with episodes of VT with rapid VR to 200s on 5/13/2022 noted  - CHADSVASC > 2, Patient previously on Xarelto   - Hold A/C at this time in setting of recent large CVA to avoid hemorraghic conversion     #CAD s/p CABG   - No recorded echo on file   - No indication for permissive hypertension per neuro   - F/U POCUS to assess volume status and LV function  - TTE w/ dilated LA, mod pulm htn, mitral regurg      Respiratory   - Intubated, volume control   - ABG: pH 7.3 Co2 51 O2 32 HCO3 25   - F/U ABG, titrate vent settings. CPAPed today 5/15 on 8/5, with decent lung volumes, but without much purposeful neurological activity.       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   - on NGT      Renal   #Rhabdomyolysis   - Scr .61 at baseline   - CK 1006, downtrending  - Gentle fluid administration, trend CK   - Strict I/O, avoid nephrotoxic agents  - Trend BMP     ID  - WBC 14 with neutrophilic predominance, improving   - CT chest: patchy opacities bilaterally 2/2 infection vs pulmonary edema   - Urinalysis neg  - Blood cultures x2 and Urine cultures NGTD drawn 5/14  - c/w zosyn for now, today is day 3 out of 5 for aspiration PNA    Endo   - No active issues     Heme   - hep subq Patient is a 89 y/o F w/ PMHx HTN, HLD, Afib on Xarelto, CAD s/p CABG presenting to the ED after being found unresponsive at home. CT head showed large subacute left MCA territory infarct without midline shift or mass effect concerning for CVA; Transferred to MICU for further management.       Neuro   - Intubated, sedated  - On propofol continue to wean, utilize precedex.    #Benzo dependence  - See ISTOP note. At home was taking tramadol and ativan. Was taking 3mg Ativan QD. Will resume here to avoid withdrawal.    #CVA  - CT non-Con: Large subacute left MCA territory infarct without significant mass effect or midline shift  -  Presentation concerning for ischemic stroke   - Hold ASA  - Neurology following, recs appreciated     CV  #Shock 2/2 neurogenic from sedation likely.  - On john Gtt    #Afib RVR  - In the ED, found to be in Afib RVR with rates to >160  - Home meds: Pending med rec from patient family   - Started on Amio gtt in the ED, now sinus on PO amio  - AICD interrogated with episodes of VT with rapid VR to 200s on 5/13/2022 noted  - CHADSVASC > 2, Patient previously on Xarelto   - Hold A/C at this time in setting of recent large CVA to avoid hemorraghic conversion     #CAD s/p CABG   - No recorded echo on file   - No indication for permissive hypertension per neuro   - F/U POCUS to assess volume status and LV function  - TTE w/ dilated LA, mod pulm htn, mitral regurg      Respiratory   - Intubated, volume control   - ABG: pH 7.3 Co2 51 O2 32 HCO3 25   - F/U ABG, titrate vent settings. CPAPed today 5/15 on 8/5, with decent lung volumes, but without much purposeful neurological activity.       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   - on NGT      Renal   #Rhabdomyolysis   - Scr .61 at baseline   - CK 1006, downtrending  - Gentle fluid administration, trend CK   - Strict I/O, avoid nephrotoxic agents  - Trend BMP     ID  - WBC 14 with neutrophilic predominance, improving   - CT chest: patchy opacities bilaterally 2/2 infection vs pulmonary edema   - Urinalysis neg  - Blood cultures x2 and Urine cultures NGTD drawn 5/14  - c/w zosyn for now, today is day 3 out of 5 for aspiration PNA    Endo   - No active issues     Heme   - hep subq    GOC  - 5/15: extensive GOC was had with son (geraldine kaur, HCP) and daughter in law at bedside. Expressed that the patient would never want to be intubated for a long period of time, especially if irreveresible condition. Patient previously was very independent, and even in this state they doubt she would want to live this way. "she would rather die than go to a nursing home." Expressed that they would like extubation tomorrow and to see what and if she could breathe on her own. If unable to breathe on her own they would want her to be comfortable. Expressed wish to minimize suffering. Expressed desire to be in hospital while extubation occurred. They expressed that she would never want a PEG tube, and would not want forceful chest compressions.  Patient now DNR/DNI, do not place PEG tube. Plan for extubation tomorrow 5/16. Do not reintubate. SHEBA in chart

## 2022-05-15 NOTE — PROGRESS NOTE ADULT - ASSESSMENT
Ms. Amaro is a 89 yo woman with large left MCA infarct.   Very poor prognosis -  she is very likely to have long term outcome with severe aphasia and severe right hemiparesis.    Continued C discussions with family.     Thank you

## 2022-05-15 NOTE — PROGRESS NOTE ADULT - SUBJECTIVE AND OBJECTIVE BOX
On sedation.    Neurologic:  Mental Status: Coma.  No response to supraorbital pressure.   Cranial Nerves: Visual fields - no BTT,B; Pupils; OD slightly larger than OS, but reactive to light. Eyes in primary gaze with mild exotropia.  Difficult to adequately assess facial symmetry - ? R lower facial palsy.  Motor: Non-purposeful movements in the LUE and LLE. No response to pain on right side.  Flaccid tone throughout. No tremors noted.

## 2022-05-15 NOTE — PROGRESS NOTE ADULT - ATTENDING COMMENTS
89 year old female with AF on Xarelto, CAD s/p CABG presents after being found unresponsive at home for unclear amount of time found to have subacute L MCA infarct, intubated for airway protection, and had sustained VT started on amiodarone in ED admitted to ICU.   - Wean Precedex to better assess neurologic status.  - On neuro exam, pupils responsive and moving all extremities spontaneously except the RUL which she does move to pain.  - SAT and SBT as tolerated.   - Continue Zosyn 3/5.  - Patient is DNR. Will need to continue GOC discussion with family.  - Prognosis guarded.

## 2022-05-15 NOTE — CHART NOTE - NSCHARTNOTEFT_GEN_A_CORE
Patient Name: Makayla Hawkins Date: 05/29/1930  Address: [address], [place] [address]Ddd: Female  Rx Written	Rx Dispensed	Drug	Quantity	Days Supply	Prescriber Name	Prescriber Hilaria #	Payment Method	Dispenser  04/19/2022	04/20/2022	lorazepam 2 mg tablet	45	30	Bipin Luque MD	[id number]	Insurance	[facility]  03/18/2022	03/23/2022	tramadol hcl 50 mg tablet	90	30	Goldberg, Avram Z MD	[id number]	Insurance	[facility]  03/22/2022	03/23/2022	lorazepam 2 mg tablet	45	30	LaxBipin lambert MD	[id number]	Insurance	[facility]  01/10/2022	03/05/2022	tramadol hcl 50 mg tablet	21	7	Goldberg, Avram Z MD	[id number]	[place]	[facility]  01/10/2022	02/22/2022	tramadol hcl 50 mg tablet	21	7	Goldberg, Avram Z MD	[id number]	[place]	[facility]  02/22/2022	02/22/2022	lorazepam 2 mg tablet	45	30	LaxBipin lambert MD	[id number]	Insurance	[facility]  01/10/2022	02/10/2022	tramadol hcl 50 mg tablet	21	7	Goldberg, Avram Z MD	[id number]	Insurance	[facility]  01/10/2022	01/31/2022	tramadol hcl 50 mg tablet	21	7	Goldberg, Avram Z MD	[id number]	Insurance	[facility]  01/24/2022	01/25/2022	lorazepam 2 mg tablet	45	30	LaxBipin lambert MD	[id number]	Insurance	[facility]  01/10/2022	01/17/2022	tramadol hcl 50 mg tablet	21	7	Goldberg, Avram Z MD	[id number]	Insurance	[facility]  01/10/2022	01/10/2022	tramadol hcl 50 mg tablet	21	7	Goldberg, Avram Z MD	[id number]	Insurance	[facility]  12/27/2021	12/27/2021	lorazepam 2 mg tablet	45	30	LaxBipin lambert MD	[id number]	Insurance	[facility]  11/29/2021	11/29/2021	lorazepam 2 mg tablet	45	30	LaxBipin lambert MD	[id number]	Insurance	[facility]  11/26/2021	11/26/2021	lorazepam 2 mg tablet	3	2	LaxBipin lambert MD	[id number]	Insurance	[facility]  11/01/2021	11/02/2021	lorazepam 2 mg tablet	45	30	LaxBipin lambert MD	[id number]	Insurance	[facility]  10/04/2021	10/05/2021	lorazepam 2 mg tablet	45	30	LaxerBipin MD	[id number]	Insurance	[facility]  09/06/2021	09/08/2021	lorazepam 2 mg tablet	45	30	LaxerBipin MD	[id number]	Insurance	[facility]  08/11/2021	08/11/2021	lorazepam 2 mg tablet	45	30	LaxerBipin MD	[id number]	Insurance	[facility]  07/15/2021	07/16/2021	lorazepam 2 mg tablet	45	30	LaxerBipin MD	[id number]	Insurance	[facility]  05/19/2021	05/19/2021	lorazepam 2 mg tablet	45	30	Eduardo Gr MD	[id number]	Insurance	[facility]

## 2022-05-15 NOTE — PROGRESS NOTE ADULT - SUBJECTIVE AND OBJECTIVE BOX
MICU Progress Note  5/15/2022      Interval Events:  Pt intubated/sedated. HR controlled overnight on PO amio. Continues on Prop and Precedex GTT. 101.3 Fever at midnight.     REVIEW OF SYSTEMS:  [x] Unable to assess ROS because intubated/sedated    OBJECTIVE:  ICU Vital Signs Last 24 Hrs  T(C): 38.5 (15 May 2022 00:00), Max: 38.8 (14 May 2022 16:00)  T(F): 101.3 (15 May 2022 00:00), Max: 101.8 (14 May 2022 16:00)  HR: 60 (15 May 2022 06:00) (11 - 110)  BP: 94/46 (15 May 2022 06:00) (94/46 - 155/72)  BP(mean): 58 (15 May 2022 06:00) (58 - 97)  ABP: --  ABP(mean): --  RR: 16 (15 May 2022 06:00) (15 - 20)  SpO2: 96% (15 May 2022 06:00) (94% - 100%)      Mode: AC/ CMV (Assist Control/ Continuous Mandatory Ventilation)  RR (machine): 16  TV (machine): 400  FiO2: 21  PEEP: 5  ITime: 0.71  MAP: 10  PIP: 26        CAPILLARY BLOOD GLUCOSE      PHYSICAL EXAM:  Gen: Intubated, sedated   Head: NCAT  HEENT: EOMI, oral mucosa moist, normal conjunctiva  Lung: CTAB, course breath sounds   CV: RRR, no murmurs, rubs or gallops, no le edema, no jvd   Abd: soft, NTND, no guarding, no CVA tenderness  MSK: no visible deformities  Neuro: RLE weakness  Skin: Warm, well perfused, no rash  Psych: normal affect.    LINES:    MEDICATIONS  (STANDING):  aMIOdarone    Tablet 200 milliGRAM(s) Oral daily  chlorhexidine 0.12% Liquid 15 milliLiter(s) Oral Mucosa every 12 hours  chlorhexidine 4% Liquid 1 Application(s) Topical <User Schedule>  dexMEDEtomidine Infusion 0.01 MICROgram(s)/kG/Hr (0.13 mL/Hr) IV Continuous <Continuous>  heparin   Injectable 5000 Unit(s) SubCutaneous every 8 hours  lactated ringers. 1000 milliLiter(s) (100 mL/Hr) IV Continuous <Continuous>  phenylephrine    Infusion 0.5 MICROgram(s)/kG/Min (9.9 mL/Hr) IV Continuous <Continuous>  piperacillin/tazobactam IVPB.. 3.375 Gram(s) IV Intermittent every 8 hours  propofol Infusion 10 MICROgram(s)/kG/Min (4.2 mL/Hr) IV Continuous <Continuous>        LABS:                                   11.7   8.86  )-----------( 132      ( 15 May 2022 02:55 )             36.6     Hgb Trend: 13.4<--, 14.6<--  05-15    139  |  107  |  16  ----------------------------<  110<H>  3.2<L>   |  21<L>  |  0.78    Ca    8.1<L>      15 May 2022 02:55  Phos  2.1     05-15  Mg     2.20     05-15    TPro  5.6<L>  /  Alb  2.9<L>  /  TBili  0.9  /  DBili  x   /  AST  26  /  ALT  20  /  AlkPhos  63  05-15      Creatinine Trend: 0.87<--, 0.61<--  PT/INR - ( 13 May 2022 15:33 )   PT: 11.7 sec;   INR: 1.01 ratio         PTT - ( 13 May 2022 15:33 )  PTT:23.7 sec  Urinalysis Basic - ( 13 May 2022 17:43 )    Color: Yellow / Appearance: Slightly Turbid / S.023 / pH: x  Gluc: x / Ketone: Moderate  / Bili: Negative / Urobili: <2 mg/dL   Blood: x / Protein: 300 mg/dL / Nitrite: Negative   Leuk Esterase: Negative / RBC: 7 /HPF / WBC x   Sq Epi: x / Non Sq Epi: 4 /HPF / Bacteria: x      Venous Blood Gas:   @ 01:50  7.43/40/37/26/59.5  VBG Lactate: 2.3  Venous Blood Gas:   @ 15:33  7.30/51/32/25/42.5  VBG Lactate: 4.2      MICROBIOLOGY:     RADIOLOGY:  [ ] Reviewed and interpreted by me    EKG: MICU Progress Note  5/15/2022      Interval Events:  Pt intubated/sedated. Continues on Connor and Precedex GTT. 101.3 Fever at midnight.     Tele Review:  HR controlled overnight on PO amio. HR 60-80 overnight. V paced @~60 since around 2:00AM.       REVIEW OF SYSTEMS:  [x] Unable to assess ROS because intubated/sedated    OBJECTIVE:  ICU Vital Signs Last 24 Hrs  T(C): 38.5 (15 May 2022 00:00), Max: 38.8 (14 May 2022 16:00)  T(F): 101.3 (15 May 2022 00:00), Max: 101.8 (14 May 2022 16:00)  HR: 60 (15 May 2022 06:00) (11 - 110)  BP: 94/46 (15 May 2022 06:00) (94/46 - 155/72)  BP(mean): 58 (15 May 2022 06:00) (58 - 97)  ABP: --  ABP(mean): --  RR: 16 (15 May 2022 06:00) (15 - 20)  SpO2: 96% (15 May 2022 06:00) (94% - 100%)      Mode: AC/ CMV (Assist Control/ Continuous Mandatory Ventilation)  RR (machine): 16  TV (machine): 400  FiO2: 21  PEEP: 5  ITime: 0.71  MAP: 10  PIP: 26        CAPILLARY BLOOD GLUCOSE      PHYSICAL EXAM:  Gen: Intubated, sedated   Head: NCAT  HEENT: EOMI, oral mucosa moist, normal conjunctiva  Lung: CTAB, course breath sounds   CV: RRR, no murmurs, rubs or gallops, no le edema, no jvd   Abd: soft, NTND, no guarding, no CVA tenderness  MSK: no visible deformities  Neuro: RLE weakness  Skin: Warm, well perfused, no rash  Psych: normal affect.    LINES:    MEDICATIONS  (STANDING):  aMIOdarone    Tablet 200 milliGRAM(s) Oral daily  chlorhexidine 0.12% Liquid 15 milliLiter(s) Oral Mucosa every 12 hours  chlorhexidine 4% Liquid 1 Application(s) Topical <User Schedule>  dexMEDEtomidine Infusion 0.01 MICROgram(s)/kG/Hr (0.13 mL/Hr) IV Continuous <Continuous>  heparin   Injectable 5000 Unit(s) SubCutaneous every 8 hours  lactated ringers. 1000 milliLiter(s) (100 mL/Hr) IV Continuous <Continuous>  phenylephrine    Infusion 0.5 MICROgram(s)/kG/Min (9.9 mL/Hr) IV Continuous <Continuous>  piperacillin/tazobactam IVPB.. 3.375 Gram(s) IV Intermittent every 8 hours  propofol Infusion 10 MICROgram(s)/kG/Min (4.2 mL/Hr) IV Continuous <Continuous>        LABS:                                   11.7   8.86  )-----------( 132      ( 15 May 2022 02:55 )             36.6     Hgb Trend: 13.4<--, 14.6<--  05-15    139  |  107  |  16  ----------------------------<  110<H>  3.2<L>   |  21<L>  |  0.78    Ca    8.1<L>      15 May 2022 02:55  Phos  2.1     -15  Mg     2.20     05-15    TPro  5.6<L>  /  Alb  2.9<L>  /  TBili  0.9  /  DBili  x   /  AST  26  /  ALT  20  /  AlkPhos  63  05-15      Creatinine Trend: 0.87<--, 0.61<--  PT/INR - ( 13 May 2022 15:33 )   PT: 11.7 sec;   INR: 1.01 ratio         PTT - ( 13 May 2022 15:33 )  PTT:23.7 sec  Urinalysis Basic - ( 13 May 2022 17:43 )    Color: Yellow / Appearance: Slightly Turbid / S.023 / pH: x  Gluc: x / Ketone: Moderate  / Bili: Negative / Urobili: <2 mg/dL   Blood: x / Protein: 300 mg/dL / Nitrite: Negative   Leuk Esterase: Negative / RBC: 7 /HPF / WBC x   Sq Epi: x / Non Sq Epi: 4 /HPF / Bacteria: x      Venous Blood Gas:   @ 01:50  7.43/40/37/26/59.5  VBG Lactate: 2.3  Venous Blood Gas:   @ 15:33  7.30/51/32/25/42.5  VBG Lactate: 4.2      MICROBIOLOGY:     RADIOLOGY:  [ ] Reviewed and interpreted by me    EKG:

## 2022-05-16 LAB
ALBUMIN SERPL ELPH-MCNC: 2.9 G/DL — LOW (ref 3.3–5)
ALP SERPL-CCNC: 63 U/L — SIGNIFICANT CHANGE UP (ref 40–120)
ALT FLD-CCNC: 22 U/L — SIGNIFICANT CHANGE UP (ref 4–33)
ANION GAP SERPL CALC-SCNC: 9 MMOL/L — SIGNIFICANT CHANGE UP (ref 7–14)
ANION GAP SERPL CALC-SCNC: 9 MMOL/L — SIGNIFICANT CHANGE UP (ref 7–14)
AST SERPL-CCNC: 25 U/L — SIGNIFICANT CHANGE UP (ref 4–32)
BASOPHILS # BLD AUTO: 0.02 K/UL — SIGNIFICANT CHANGE UP (ref 0–0.2)
BASOPHILS # BLD AUTO: 0.04 K/UL — SIGNIFICANT CHANGE UP (ref 0–0.2)
BASOPHILS NFR BLD AUTO: 0.4 % — SIGNIFICANT CHANGE UP (ref 0–2)
BASOPHILS NFR BLD AUTO: 0.5 % — SIGNIFICANT CHANGE UP (ref 0–2)
BILIRUB SERPL-MCNC: 0.7 MG/DL — SIGNIFICANT CHANGE UP (ref 0.2–1.2)
BLOOD GAS ARTERIAL COMPREHENSIVE RESULT: SIGNIFICANT CHANGE UP
BUN SERPL-MCNC: 15 MG/DL — SIGNIFICANT CHANGE UP (ref 7–23)
BUN SERPL-MCNC: 16 MG/DL — SIGNIFICANT CHANGE UP (ref 7–23)
CALCIUM SERPL-MCNC: 8.4 MG/DL — SIGNIFICANT CHANGE UP (ref 8.4–10.5)
CALCIUM SERPL-MCNC: 8.4 MG/DL — SIGNIFICANT CHANGE UP (ref 8.4–10.5)
CHLORIDE SERPL-SCNC: 109 MMOL/L — HIGH (ref 98–107)
CHLORIDE SERPL-SCNC: 110 MMOL/L — HIGH (ref 98–107)
CK SERPL-CCNC: 74 U/L — SIGNIFICANT CHANGE UP (ref 25–170)
CO2 SERPL-SCNC: 20 MMOL/L — LOW (ref 22–31)
CO2 SERPL-SCNC: 21 MMOL/L — LOW (ref 22–31)
CREAT SERPL-MCNC: 0.68 MG/DL — SIGNIFICANT CHANGE UP (ref 0.5–1.3)
CREAT SERPL-MCNC: 0.7 MG/DL — SIGNIFICANT CHANGE UP (ref 0.5–1.3)
EGFR: 82 ML/MIN/1.73M2 — SIGNIFICANT CHANGE UP
EGFR: 82 ML/MIN/1.73M2 — SIGNIFICANT CHANGE UP
EOSINOPHIL # BLD AUTO: 0.11 K/UL — SIGNIFICANT CHANGE UP (ref 0–0.5)
EOSINOPHIL # BLD AUTO: 0.12 K/UL — SIGNIFICANT CHANGE UP (ref 0–0.5)
EOSINOPHIL NFR BLD AUTO: 1.4 % — SIGNIFICANT CHANGE UP (ref 0–6)
EOSINOPHIL NFR BLD AUTO: 2.2 % — SIGNIFICANT CHANGE UP (ref 0–6)
GLUCOSE SERPL-MCNC: 135 MG/DL — HIGH (ref 70–99)
GLUCOSE SERPL-MCNC: 96 MG/DL — SIGNIFICANT CHANGE UP (ref 70–99)
HCT VFR BLD CALC: 36.5 % — SIGNIFICANT CHANGE UP (ref 34.5–45)
HCT VFR BLD CALC: 36.9 % — SIGNIFICANT CHANGE UP (ref 34.5–45)
HGB BLD-MCNC: 11.2 G/DL — LOW (ref 11.5–15.5)
HGB BLD-MCNC: 11.8 G/DL — SIGNIFICANT CHANGE UP (ref 11.5–15.5)
IANC: 4.02 K/UL — SIGNIFICANT CHANGE UP (ref 1.8–7.4)
IANC: 5.39 K/UL — SIGNIFICANT CHANGE UP (ref 1.8–7.4)
IMM GRANULOCYTES NFR BLD AUTO: 0.2 % — SIGNIFICANT CHANGE UP (ref 0–1.5)
IMM GRANULOCYTES NFR BLD AUTO: 0.4 % — SIGNIFICANT CHANGE UP (ref 0–1.5)
LYMPHOCYTES # BLD AUTO: 0.7 K/UL — LOW (ref 1–3.3)
LYMPHOCYTES # BLD AUTO: 1.23 K/UL — SIGNIFICANT CHANGE UP (ref 1–3.3)
LYMPHOCYTES # BLD AUTO: 12.8 % — LOW (ref 13–44)
LYMPHOCYTES # BLD AUTO: 16.1 % — SIGNIFICANT CHANGE UP (ref 13–44)
MAGNESIUM SERPL-MCNC: 2.1 MG/DL — SIGNIFICANT CHANGE UP (ref 1.6–2.6)
MAGNESIUM SERPL-MCNC: 2.1 MG/DL — SIGNIFICANT CHANGE UP (ref 1.6–2.6)
MCHC RBC-ENTMCNC: 29.4 PG — SIGNIFICANT CHANGE UP (ref 27–34)
MCHC RBC-ENTMCNC: 29.9 PG — SIGNIFICANT CHANGE UP (ref 27–34)
MCHC RBC-ENTMCNC: 30.7 GM/DL — LOW (ref 32–36)
MCHC RBC-ENTMCNC: 32 GM/DL — SIGNIFICANT CHANGE UP (ref 32–36)
MCV RBC AUTO: 93.4 FL — SIGNIFICANT CHANGE UP (ref 80–100)
MCV RBC AUTO: 95.8 FL — SIGNIFICANT CHANGE UP (ref 80–100)
MONOCYTES # BLD AUTO: 0.6 K/UL — SIGNIFICANT CHANGE UP (ref 0–0.9)
MONOCYTES # BLD AUTO: 0.84 K/UL — SIGNIFICANT CHANGE UP (ref 0–0.9)
MONOCYTES NFR BLD AUTO: 11 % — SIGNIFICANT CHANGE UP (ref 2–14)
MONOCYTES NFR BLD AUTO: 11 % — SIGNIFICANT CHANGE UP (ref 2–14)
NEUTROPHILS # BLD AUTO: 4.02 K/UL — SIGNIFICANT CHANGE UP (ref 1.8–7.4)
NEUTROPHILS # BLD AUTO: 5.39 K/UL — SIGNIFICANT CHANGE UP (ref 1.8–7.4)
NEUTROPHILS NFR BLD AUTO: 70.6 % — SIGNIFICANT CHANGE UP (ref 43–77)
NEUTROPHILS NFR BLD AUTO: 73.4 % — SIGNIFICANT CHANGE UP (ref 43–77)
NRBC # BLD: 0 /100 WBCS — SIGNIFICANT CHANGE UP
NRBC # BLD: 0 /100 WBCS — SIGNIFICANT CHANGE UP
NRBC # FLD: 0 K/UL — SIGNIFICANT CHANGE UP
NRBC # FLD: 0 K/UL — SIGNIFICANT CHANGE UP
PHOSPHATE SERPL-MCNC: 2.8 MG/DL — SIGNIFICANT CHANGE UP (ref 2.5–4.5)
PHOSPHATE SERPL-MCNC: 2.9 MG/DL — SIGNIFICANT CHANGE UP (ref 2.5–4.5)
PLATELET # BLD AUTO: 125 K/UL — LOW (ref 150–400)
PLATELET # BLD AUTO: 152 K/UL — SIGNIFICANT CHANGE UP (ref 150–400)
POTASSIUM SERPL-MCNC: 3.9 MMOL/L — SIGNIFICANT CHANGE UP (ref 3.5–5.3)
POTASSIUM SERPL-MCNC: 4.4 MMOL/L — SIGNIFICANT CHANGE UP (ref 3.5–5.3)
POTASSIUM SERPL-SCNC: 3.9 MMOL/L — SIGNIFICANT CHANGE UP (ref 3.5–5.3)
POTASSIUM SERPL-SCNC: 4.4 MMOL/L — SIGNIFICANT CHANGE UP (ref 3.5–5.3)
PROT SERPL-MCNC: 5.4 G/DL — LOW (ref 6–8.3)
RBC # BLD: 3.81 M/UL — SIGNIFICANT CHANGE UP (ref 3.8–5.2)
RBC # BLD: 3.95 M/UL — SIGNIFICANT CHANGE UP (ref 3.8–5.2)
RBC # FLD: 17.2 % — HIGH (ref 10.3–14.5)
RBC # FLD: 17.2 % — HIGH (ref 10.3–14.5)
SODIUM SERPL-SCNC: 139 MMOL/L — SIGNIFICANT CHANGE UP (ref 135–145)
SODIUM SERPL-SCNC: 139 MMOL/L — SIGNIFICANT CHANGE UP (ref 135–145)
WBC # BLD: 5.47 K/UL — SIGNIFICANT CHANGE UP (ref 3.8–10.5)
WBC # BLD: 7.64 K/UL — SIGNIFICANT CHANGE UP (ref 3.8–10.5)
WBC # FLD AUTO: 5.47 K/UL — SIGNIFICANT CHANGE UP (ref 3.8–10.5)
WBC # FLD AUTO: 7.64 K/UL — SIGNIFICANT CHANGE UP (ref 3.8–10.5)

## 2022-05-16 PROCEDURE — 71045 X-RAY EXAM CHEST 1 VIEW: CPT | Mod: 26,77

## 2022-05-16 PROCEDURE — 71045 X-RAY EXAM CHEST 1 VIEW: CPT | Mod: 26

## 2022-05-16 PROCEDURE — 43752 NASAL/OROGASTRIC W/TUBE PLMT: CPT

## 2022-05-16 PROCEDURE — 99232 SBSQ HOSP IP/OBS MODERATE 35: CPT

## 2022-05-16 PROCEDURE — 36600 WITHDRAWAL OF ARTERIAL BLOOD: CPT | Mod: 59

## 2022-05-16 RX ORDER — SENNA PLUS 8.6 MG/1
2 TABLET ORAL AT BEDTIME
Refills: 0 | Status: DISCONTINUED | OUTPATIENT
Start: 2022-05-16 | End: 2022-05-20

## 2022-05-16 RX ORDER — SENNA PLUS 8.6 MG/1
2 TABLET ORAL AT BEDTIME
Refills: 0 | Status: DISCONTINUED | OUTPATIENT
Start: 2022-05-16 | End: 2022-05-16

## 2022-05-16 RX ORDER — POLYETHYLENE GLYCOL 3350 17 G/17G
17 POWDER, FOR SOLUTION ORAL DAILY
Refills: 0 | Status: DISCONTINUED | OUTPATIENT
Start: 2022-05-16 | End: 2022-05-20

## 2022-05-16 RX ORDER — METOPROLOL TARTRATE 50 MG
5 TABLET ORAL ONCE
Refills: 0 | Status: COMPLETED | OUTPATIENT
Start: 2022-05-16 | End: 2022-05-16

## 2022-05-16 RX ORDER — MORPHINE SULFATE 50 MG/1
1 CAPSULE, EXTENDED RELEASE ORAL EVERY 4 HOURS
Refills: 0 | Status: DISCONTINUED | OUTPATIENT
Start: 2022-05-16 | End: 2022-05-16

## 2022-05-16 RX ORDER — POLYETHYLENE GLYCOL 3350 17 G/17G
17 POWDER, FOR SOLUTION ORAL DAILY
Refills: 0 | Status: DISCONTINUED | OUTPATIENT
Start: 2022-05-16 | End: 2022-05-16

## 2022-05-16 RX ORDER — ROBINUL 0.2 MG/ML
0.2 INJECTION INTRAMUSCULAR; INTRAVENOUS DAILY
Refills: 0 | Status: DISCONTINUED | OUTPATIENT
Start: 2022-05-16 | End: 2022-05-20

## 2022-05-16 RX ORDER — ATORVASTATIN CALCIUM 80 MG/1
40 TABLET, FILM COATED ORAL AT BEDTIME
Refills: 0 | Status: DISCONTINUED | OUTPATIENT
Start: 2022-05-16 | End: 2022-05-20

## 2022-05-16 RX ORDER — ASPIRIN/CALCIUM CARB/MAGNESIUM 324 MG
81 TABLET ORAL DAILY
Refills: 0 | Status: DISCONTINUED | OUTPATIENT
Start: 2022-05-16 | End: 2022-05-20

## 2022-05-16 RX ORDER — PHENYLEPHRINE HYDROCHLORIDE 10 MG/ML
1 INJECTION INTRAVENOUS
Qty: 40 | Refills: 0 | Status: DISCONTINUED | OUTPATIENT
Start: 2022-05-16 | End: 2022-05-17

## 2022-05-16 RX ADMIN — PIPERACILLIN AND TAZOBACTAM 25 GRAM(S): 4; .5 INJECTION, POWDER, LYOPHILIZED, FOR SOLUTION INTRAVENOUS at 05:08

## 2022-05-16 RX ADMIN — CHLORHEXIDINE GLUCONATE 1 APPLICATION(S): 213 SOLUTION TOPICAL at 07:20

## 2022-05-16 RX ADMIN — Medication 5 MILLIGRAM(S): at 20:00

## 2022-05-16 RX ADMIN — ROBINUL 0.2 MILLIGRAM(S): 0.2 INJECTION INTRAMUSCULAR; INTRAVENOUS at 17:31

## 2022-05-16 RX ADMIN — CHLORHEXIDINE GLUCONATE 15 MILLILITER(S): 213 SOLUTION TOPICAL at 17:11

## 2022-05-16 RX ADMIN — Medication 650 MILLIGRAM(S): at 08:38

## 2022-05-16 RX ADMIN — PIPERACILLIN AND TAZOBACTAM 25 GRAM(S): 4; .5 INJECTION, POWDER, LYOPHILIZED, FOR SOLUTION INTRAVENOUS at 13:12

## 2022-05-16 RX ADMIN — AMIODARONE HYDROCHLORIDE 200 MILLIGRAM(S): 400 TABLET ORAL at 05:08

## 2022-05-16 RX ADMIN — PIPERACILLIN AND TAZOBACTAM 25 GRAM(S): 4; .5 INJECTION, POWDER, LYOPHILIZED, FOR SOLUTION INTRAVENOUS at 21:33

## 2022-05-16 RX ADMIN — PHENYLEPHRINE HYDROCHLORIDE 19.8 MICROGRAM(S)/KG/MIN: 10 INJECTION INTRAVENOUS at 07:46

## 2022-05-16 RX ADMIN — HEPARIN SODIUM 5000 UNIT(S): 5000 INJECTION INTRAVENOUS; SUBCUTANEOUS at 05:08

## 2022-05-16 RX ADMIN — Medication 3 MILLIGRAM(S): at 11:47

## 2022-05-16 RX ADMIN — Medication 3 MILLIGRAM(S): at 22:37

## 2022-05-16 RX ADMIN — HEPARIN SODIUM 5000 UNIT(S): 5000 INJECTION INTRAVENOUS; SUBCUTANEOUS at 13:12

## 2022-05-16 RX ADMIN — Medication 81 MILLIGRAM(S): at 11:47

## 2022-05-16 RX ADMIN — SENNA PLUS 2 TABLET(S): 8.6 TABLET ORAL at 22:37

## 2022-05-16 RX ADMIN — DEXMEDETOMIDINE HYDROCHLORIDE IN 0.9% SODIUM CHLORIDE 0.13 MICROGRAM(S)/KG/HR: 4 INJECTION INTRAVENOUS at 07:46

## 2022-05-16 RX ADMIN — ATORVASTATIN CALCIUM 40 MILLIGRAM(S): 80 TABLET, FILM COATED ORAL at 22:37

## 2022-05-16 RX ADMIN — HEPARIN SODIUM 5000 UNIT(S): 5000 INJECTION INTRAVENOUS; SUBCUTANEOUS at 21:32

## 2022-05-16 RX ADMIN — CHLORHEXIDINE GLUCONATE 15 MILLILITER(S): 213 SOLUTION TOPICAL at 05:09

## 2022-05-16 RX ADMIN — POLYETHYLENE GLYCOL 3350 17 GRAM(S): 17 POWDER, FOR SOLUTION ORAL at 11:47

## 2022-05-16 RX ADMIN — Medication 650 MILLIGRAM(S): at 11:21

## 2022-05-16 NOTE — PROGRESS NOTE ADULT - SUBJECTIVE AND OBJECTIVE BOX
Saumya Lozano MD, MS | PGY-2  Department of Internal Medicine  [phone] ([facility]) | [id number] ([facility])    MICU Progress Note    Interval Events:    Meds administered:  chlorhexidine 0.12% Liquid: 15 milliLiter(s) Oral Mucosa (05-16 @ 05:09)  piperacillin/tazobactam IVPB..: 25 mL/Hr IV Intermittent (05-16 @ 05:08)  heparin   Injectable: 5000 Unit(s) SubCutaneous (05-16 @ 05:08)  aMIOdarone    Tablet: 200 milliGRAM(s) Oral (05-16 @ 05:08)  heparin   Injectable: 5000 Unit(s) SubCutaneous (05-15 @ 21:21)  piperacillin/tazobactam IVPB..: 25 mL/Hr IV Intermittent (05-15 @ 21:20)        REVIEW OF SYSTEMS:  [ ] Unable to assess ROS because ______  [ ] Negative except as stated in HPI  CONSTITUTIONAL: No fever, chills, night sweats, or fatigue  EYES: No eye pain, visual disturbances, or discharge  ENMT:  No difficulty hearing, tinnitus, vertigo; No sinus or throat pain  NECK: No pain or stiffness  BREASTS: No pain, masses, or nipple discharge  RESPIRATORY: No cough, wheezing, or hemoptysis; No shortness of breath  CARDIOVASCULAR: No chest pain, palpitations, dizziness, or leg swelling  GASTROINTESTINAL: No abdominal or epigastric pain. No nausea, vomiting, or hematemesis; No diarrhea or constipation. No melena or hematochezia.  GENITOURINARY: No dysuria, frequency, hematuria, or incontinence  NEUROLOGICAL: No headaches, memory loss, loss of strength, numbness, or tremors  SKIN: No itching, burning, rashes, or lesions   LYMPH NODES: No enlarged glands  ENDOCRINE: No heat or cold intolerance; No hair loss  MUSCULOSKELETAL: No joint pain or swelling; No muscle, back, or extremity pain  PSYCHIATRIC: No depression, anxiety, mood swings, or difficulty sleeping  HEME/LYMPH: No easy bruising, or bleeding gums  ALLERGY AND IMMUNOLOGIC: No hives or eczema    OBJECTIVE:  Vents:  Mode: AC/ CMV (Assist Control/ Continuous Mandatory Ventilation), RR (machine): 16, TV (machine): 400, FiO2: 21, PEEP: 5, ITime: 0.71, MAP: 9, PIP: 24    05-14 @ 07:01  -  05-15 @ 07:00  --------------------------------------------------------  IN: 3671.3 mL / OUT: 1315 mL / NET: 2356.3 mL    05-15 @ 07:01  -  05-16 @ 06:41  --------------------------------------------------------  IN: 1757 mL / OUT: 1180 mL / NET: 577 mL      CAPILLARY BLOOD GLUCOSE          Drips:    Lines:    VITALS:  T(F): 100.1 (05-16-22 @ 04:00), Max: 101 (05-15-22 @ 16:00)  HR: 70 (05-16-22 @ 06:30) (60 - 73)  BP: 135/60 (05-16-22 @ 06:30) (77/41 - 152/80)  BP(mean): 78 (05-16-22 @ 06:30) (47 - 105)  ABP: --  ABP(mean): --  RR: 38 (05-16-22 @ 06:30) (16 - 38)  SpO2: 94% (05-16-22 @ 06:30) (94% - 100%)    PHYSICAL EXAM:  GENERAL: NAD, lying in bed comfortably  HEAD:  Atraumatic, Normocephalic  EYES: EOMI, PERRLA, conjunctiva and sclera clear  ENT: Moist mucous membranes  NECK: Supple, No JVD  CHEST/LUNG: Clear to auscultation bilaterally; No rales, rhonchi, wheezing, or rubs. Unlabored respirations  HEART: Regular rate and rhythm; No murmurs, rubs, or gallops  ABDOMEN: Bowel sounds present; Soft, Nontender, Nondistended. No hepatomegaly  EXTREMITIES:  2+ Peripheral Pulses, brisk capillary refill. No clubbing, cyanosis, or edema  NERVOUS SYSTEM:  Alert & Oriented X3, speech clear. No deficits   MSK: FROM all 4 extremities, full and equal strength  SKIN: No rashes or lesions    HOSPITAL MEDICATIONS:  Standing Meds:  aMIOdarone    Tablet 200 milliGRAM(s) Oral daily  chlorhexidine 0.12% Liquid 15 milliLiter(s) Oral Mucosa every 12 hours  chlorhexidine 4% Liquid 1 Application(s) Topical <User Schedule>  dexMEDEtomidine Infusion 0.01 MICROgram(s)/kG/Hr IV Continuous <Continuous>  heparin   Injectable 5000 Unit(s) SubCutaneous every 8 hours  LORazepam     Tablet 3 milliGRAM(s) Oral daily  phenylephrine    Infusion 1 MICROgram(s)/kG/Min IV Continuous <Continuous>  piperacillin/tazobactam IVPB.. 3.375 Gram(s) IV Intermittent every 8 hours      PRN Meds:  acetaminophen     Tablet .. 650 milliGRAM(s) Oral every 6 hours PRN      LABS:  CBC 05-16-22 @ 01:09                        11.8   7.64  )-----------( 152                   36.9       Hgb trend: 11.8 <-- , 11.7 <-- , 13.4 <-- , 14.6 <--   WBC trend: 7.64 <-- , 8.86 <-- , 12.47 <-- , 15.31 <--     CMP 05-16-22 @ 01:09    139  |  109<H>  |  16  ----------------------------<  135<H>  3.9   |  21<L>  |  0.70    Ca    8.4      05-16-22 @ 01:09  Phos  2.9     05-16  Mg     2.10     05-16    TPro  5.4<L>  /  Alb  2.9<L>  /  TBili  0.7  /  DBili  x   /  AST  25  /  ALT  22  /  AlkPhos  63  05-16      Serum Cr trend: 0.70 <-- , 0.78 <-- , 0.87 <-- , 0.61 <--             MICROBIOLOGY:     Culture - Blood (collected 14 May 2022 01:50)  Source: .Blood Blood  Preliminary Report (15 May 2022 06:01):    No growth to date.    Culture - Blood (collected 14 May 2022 00:15)  Source: .Blood Blood-Peripheral  Preliminary Report (15 May 2022 06:01):    No growth to date.    Culture - Urine (collected 13 May 2022 19:04)  Source: Catheterized Catheterized  Final Report (14 May 2022 19:57):    No growth        RADIOLOGY:  [ ] Reviewed and interpreted by me    EKG:   Saumya Lozano MD, MS | PGY-2  Department of Internal Medicine  [phone] ([facility]) | [id number] ([facility])    MICU Progress Note    Interval Events: no acute events. This AM, opens eyes to name only. Able to squeeze my hand, but does not track, and does not shake head to endorse pain.     Meds administered:  chlorhexidine 0.12% Liquid: 15 milliLiter(s) Oral Mucosa (05-16 @ 05:09)  piperacillin/tazobactam IVPB..: 25 mL/Hr IV Intermittent (05-16 @ 05:08)  heparin   Injectable: 5000 Unit(s) SubCutaneous (05-16 @ 05:08)  aMIOdarone    Tablet: 200 milliGRAM(s) Oral (05-16 @ 05:08)  heparin   Injectable: 5000 Unit(s) SubCutaneous (05-15 @ 21:21)  piperacillin/tazobactam IVPB..: 25 mL/Hr IV Intermittent (05-15 @ 21:20)        REVIEW OF SYSTEMS:  [x] Unable to assess ROS because of mental status   [ ] Negative except as stated in HPI  CONSTITUTIONAL: No fever, chills, night sweats, or fatigue  EYES: No eye pain, visual disturbances, or discharge  ENMT:  No difficulty hearing, tinnitus, vertigo; No sinus or throat pain  NECK: No pain or stiffness  BREASTS: No pain, masses, or nipple discharge  RESPIRATORY: No cough, wheezing, or hemoptysis; No shortness of breath  CARDIOVASCULAR: No chest pain, palpitations, dizziness, or leg swelling  GASTROINTESTINAL: No abdominal or epigastric pain. No nausea, vomiting, or hematemesis; No diarrhea or constipation. No melena or hematochezia.  GENITOURINARY: No dysuria, frequency, hematuria, or incontinence  NEUROLOGICAL: No headaches, memory loss, loss of strength, numbness, or tremors  SKIN: No itching, burning, rashes, or lesions   LYMPH NODES: No enlarged glands  ENDOCRINE: No heat or cold intolerance; No hair loss  MUSCULOSKELETAL: No joint pain or swelling; No muscle, back, or extremity pain  PSYCHIATRIC: No depression, anxiety, mood swings, or difficulty sleeping  HEME/LYMPH: No easy bruising, or bleeding gums  ALLERGY AND IMMUNOLOGIC: No hives or eczema    OBJECTIVE:  Vents:  Mode: AC/ CMV (Assist Control/ Continuous Mandatory Ventilation), RR (machine): 16, TV (machine): 400, FiO2: 21, PEEP: 5, ITime: 0.71, MAP: 9, PIP: 24    05-14 @ 07:01  -  05-15 @ 07:00  --------------------------------------------------------  IN: 3671.3 mL / OUT: 1315 mL / NET: 2356.3 mL    05-15 @ 07:01  -  05-16 @ 06:41  --------------------------------------------------------  IN: 1757 mL / OUT: 1180 mL / NET: 577 mL      CAPILLARY BLOOD GLUCOSE          Drips:    Lines:    VITALS:  T(F): 100.1 (05-16-22 @ 04:00), Max: 101 (05-15-22 @ 16:00)  HR: 70 (05-16-22 @ 06:30) (60 - 73)  BP: 135/60 (05-16-22 @ 06:30) (77/41 - 152/80)  BP(mean): 78 (05-16-22 @ 06:30) (47 - 105)  ABP: --  ABP(mean): --  RR: 38 (05-16-22 @ 06:30) (16 - 38)  SpO2: 94% (05-16-22 @ 06:30) (94% - 100%)    PHYSICAL EXAM:  GENERAL: NAD, lying in bed comfortably  HEAD:  Atraumatic, Normocephalic  EYES: EOMI, PERRLA, conjunctiva and sclera clear  ENT: Moist mucous membranes  NECK: Supple, No JVD  CHEST/LUNG: Clear to auscultation bilaterally; No rales, rhonchi, wheezing, or rubs. Unlabored respirations  HEART: Regular rate and rhythm; No murmurs, rubs, or gallops  ABDOMEN: Bowel sounds present; Soft, Nontender, Nondistended. No hepatomegaly  EXTREMITIES:  2+ Peripheral Pulses, brisk capillary refill. No clubbing, cyanosis, or edema  NERVOUS SYSTEM:  Alert & Oriented X1  MSK: Flaccid muscle tone in LUE, does not move RUE or bilateral LEs  SKIN: No rashes or lesions    HOSPITAL MEDICATIONS:  Standing Meds:  aMIOdarone    Tablet 200 milliGRAM(s) Oral daily  chlorhexidine 0.12% Liquid 15 milliLiter(s) Oral Mucosa every 12 hours  chlorhexidine 4% Liquid 1 Application(s) Topical <User Schedule>  dexMEDEtomidine Infusion 0.01 MICROgram(s)/kG/Hr IV Continuous <Continuous>  heparin   Injectable 5000 Unit(s) SubCutaneous every 8 hours  LORazepam     Tablet 3 milliGRAM(s) Oral daily  phenylephrine    Infusion 1 MICROgram(s)/kG/Min IV Continuous <Continuous>  piperacillin/tazobactam IVPB.. 3.375 Gram(s) IV Intermittent every 8 hours      PRN Meds:  acetaminophen     Tablet .. 650 milliGRAM(s) Oral every 6 hours PRN      LABS:  CBC 05-16-22 @ 01:09                        11.8   7.64  )-----------( 152                   36.9       Hgb trend: 11.8 <-- , 11.7 <-- , 13.4 <-- , 14.6 <--   WBC trend: 7.64 <-- , 8.86 <-- , 12.47 <-- , 15.31 <--     CMP 05-16-22 @ 01:09    139  |  109<H>  |  16  ----------------------------<  135<H>  3.9   |  21<L>  |  0.70    Ca    8.4      05-16-22 @ 01:09  Phos  2.9     05-16  Mg     2.10     05-16    TPro  5.4<L>  /  Alb  2.9<L>  /  TBili  0.7  /  DBili  x   /  AST  25  /  ALT  22  /  AlkPhos  63  05-16      Serum Cr trend: 0.70 <-- , 0.78 <-- , 0.87 <-- , 0.61 <--             MICROBIOLOGY:     Culture - Blood (collected 14 May 2022 01:50)  Source: .Blood Blood  Preliminary Report (15 May 2022 06:01):    No growth to date.    Culture - Blood (collected 14 May 2022 00:15)  Source: .Blood Blood-Peripheral  Preliminary Report (15 May 2022 06:01):    No growth to date.    Culture - Urine (collected 13 May 2022 19:04)  Source: Catheterized Catheterized  Final Report (14 May 2022 19:57):    No growth        RADIOLOGY:  [ ] Reviewed and interpreted by me    EKG:

## 2022-05-16 NOTE — PROGRESS NOTE ADULT - ASSESSMENT
Patient is a 89 y/o F w/ PMHx HTN, HLD, Afib on Xarelto, CAD s/p CABG presenting to the ED after being found unresponsive at home. CT head showed large subacute left MCA territory infarct without midline shift or mass effect concerning for CVA; Transferred to MICU for further management.       Neuro   - Intubated, sedated  - On propofol continue to wean, utilize precedex.    #Benzo dependence  - See ISTOP note. At home was taking tramadol and ativan. Was taking 3mg Ativan QD. Will resume here to avoid withdrawal.    #CVA  - CT non-Con: Large subacute left MCA territory infarct without significant mass effect or midline shift  -  Presentation concerning for ischemic stroke   - Hold ASA  - Neurology following, recs appreciated     CV  #Shock 2/2 neurogenic from sedation likely.  - On john Gtt    #Afib RVR  - In the ED, found to be in Afib RVR with rates to >160  - Home meds: Pending med rec from patient family   - Started on Amio gtt in the ED, now sinus on PO amio  - AICD interrogated with episodes of VT with rapid VR to 200s on 5/13/2022 noted  - CHADSVASC > 2, Patient previously on Xarelto   - Hold A/C at this time in setting of recent large CVA to avoid hemorraghic conversion     #CAD s/p CABG   - No recorded echo on file   - No indication for permissive hypertension per neuro   - F/U POCUS to assess volume status and LV function  - TTE w/ dilated LA, mod pulm htn, mitral regurg      Respiratory   - Intubated, volume control   - ABG: pH 7.3 Co2 51 O2 32 HCO3 25   - F/U ABG, titrate vent settings. CPAPed today 5/15 on 8/5, with decent lung volumes, but without much purposeful neurological activity.       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   - on NGT      Renal   #Rhabdomyolysis   - Scr .61 at baseline   - CK 1006, downtrending  - Gentle fluid administration, trend CK   - Strict I/O, avoid nephrotoxic agents  - Trend BMP     ID  - WBC 14 with neutrophilic predominance, improving   - CT chest: patchy opacities bilaterally 2/2 infection vs pulmonary edema   - Urinalysis neg  - Blood cultures x2 and Urine cultures NGTD drawn 5/14  - c/w zosyn for now, today is day 3 out of 5 for aspiration PNA    Endo   - No active issues     Heme   - hep subq    GOC  - 5/15: extensive GOC was had with son (geraldine kaur, HCP) and daughter in law at bedside. Expressed that the patient would never want to be intubated for a long period of time, especially if irreveresible condition. Patient previously was very independent, and even in this state they doubt she would want to live this way. "she would rather die than go to a nursing home." Expressed that they would like extubation tomorrow and to see what and if she could breathe on her own. If unable to breathe on her own they would want her to be comfortable. Expressed wish to minimize suffering. Expressed desire to be in hospital while extubation occurred. They expressed that she would never want a PEG tube, and would not want forceful chest compressions.  Patient now DNR/DNI, do not place PEG tube. Plan for extubation tomorrow 5/16. Do not reintubate. SHEBA in chart Patient is a 89 y/o F w/ PMHx HTN, HLD, Afib on Xarelto, CAD s/p CABG presenting to the ED after being found unresponsive at home. CT head showed large subacute left MCA territory infarct without midline shift or mass effect concerning for CVA; Transferred to MICU for further management.       Neuro   - Intubated, sedated  - On propofol continue to wean, utilize precedex.    #Benzo dependence  - See ISTOP note. At home was taking tramadol and ativan. Was taking 3mg Ativan QD. Will resume here to avoid withdrawal.    #CVA  - CT non-Con: Large subacute left MCA territory infarct without significant mass effect or midline shift  -  Presentation concerning for ischemic stroke   - restarting ASA, will follow-up CK prior to restarting statin  - Neurology following, recs appreciated     CV  #Shock 2/2 neurogenic from sedation likely.  - On john Gtt    #Afib RVR  - tolerating PO amiodarone via OGT  - AICD interrogated with episodes of VT with rapid VR to 200s on 5/13/2022 noted  - CHADSVASC > 2, Patient previously on Xarelto, hold A/C at this time in setting of recent large CVA to avoid hemorraghic conversion     #CAD s/p CABG   - No recorded echo on file   - No indication for permissive hypertension per neuro   - F/U POCUS to assess volume status and LV function  - TTE w/ dilated LA, mod pulm htn, mitral regurg      Respiratory   - Intubated, volume control   - ABG: pH 7.3 Co2 51 O2 32 HCO3 25   - F/U ABG, titrate vent settings. CPAPed today 5/15 on 8/5, with decent lung volumes, but without much purposeful neurological activity.       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 Agrees with extubation in accordance with patient's wishes to "never be plugged into machines". Ativan and morphine IV PRN ordered for air hunger. Will continue to assess BP support, agreeable to making full comfort if unable to withdraw pressors.

## 2022-05-16 NOTE — PROGRESS NOTE ADULT - ASSESSMENT
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 CT head showed large subacute left MCA territory infarct without midline shift or mass effect, neurology consulted and deemed no tPA or thrombectomy intervention. Patient was found in rapid Afib and SaO2 91% on BVM. She was intubated and sedated on propofol in ED. Episode of sustained VT with VR to 209 bpm noted and amiodarone IV load started. Currently Afib with rate controlled. EP is called for interrogation and consulted for rapid Afib /VT. Due to presentation, collateral obtained from family. Per family, patient lives at home alone and completed all ADL prior to event.       Rapid Afib and VT     RECOMMENDATIONS:  - Continuous telemetric monitoring for tachyarrhythmia--no event overnight  - Continue Amiodarone 200mg daily  - Recommend Lopressor 5mg IV q4h PRN if HR >110bpm if BP tolerates  - TTE showed LVEF 57%,dilated LA, mod pulm htn, mitral regurg  - Monitor electrolytes and replete K to 4 and Mg to 2  - Recommend AC (pt. is taking Xarelto at home) for thromboembolic ppx  given that patient has a [id number] score of 6 if no clinical contraindication and cleared by Neuro  - Pt. is DNR, febrile to 100.4 on ABX, continue care per primary team/MICU

## 2022-05-16 NOTE — SWALLOW BEDSIDE ASSESSMENT ADULT - COMMENTS
As per MICU resident 5/16/22 "Patient is a 89 y/o F w/ PMHx HTN, HLD, Afib on Xarelto, CAD s/p CABG presenting to the ED after being found unresponsive at home. CT head showed large subacute left MCA territory infarct without midline shift or mass effect concerning for CVA; Transferred to MICU for further management."    CXR 5/16/22  "No focal consolidation. Endotracheal tube tip terminates above the alda."  CTH 5/13/22 " Mild right frontal scalp swelling. No acute intracranial hemorrhage, vasogenic edema, extra-axial collection or calvarial   fracture. Large subacute left MCA territory infarct without significant mass effect or midline shift."    Orders for bedside swallow assessment received, chart contents noted. Upon arrival to room, patient receiving supplemental O2 via facetent. , SPO2 95% Discussed with RN who stated patient unable to be weaned to nasal cannula at this time, and therefore not appropriate for swallow evaluation. Reconsult this service as patient is medically optimized and tolerating nasal cannula or room air.

## 2022-05-16 NOTE — PROGRESS NOTE ADULT - SUBJECTIVE AND OBJECTIVE BOX
Interval history:  + intubated  + Febrile 100.4 on ABX   no o/n event      PAST MEDICAL & SURGICAL HISTORY:  No pertinent past medical history    S/P CABG x 1    Atrial fibrillation    Hypertension    Hyperlipemia    No significant past surgical history        MEDICATIONS  (STANDING):  aMIOdarone    Tablet 200 milliGRAM(s) Oral daily  aspirin  chewable 81 milliGRAM(s) Oral daily  chlorhexidine 0.12% Liquid 15 milliLiter(s) Oral Mucosa every 12 hours  chlorhexidine 4% Liquid 1 Application(s) Topical <User Schedule>  dexMEDEtomidine Infusion 0.01 MICROgram(s)/kG/Hr (0.13 mL/Hr) IV Continuous <Continuous>  heparin   Injectable 5000 Unit(s) SubCutaneous every 8 hours  LORazepam     Tablet 3 milliGRAM(s) Oral daily  phenylephrine    Infusion 1 MICROgram(s)/kG/Min (19.8 mL/Hr) IV Continuous <Continuous>  piperacillin/tazobactam IVPB.. 3.375 Gram(s) IV Intermittent every 8 hours  polyethylene glycol 3350 17 Gram(s) Oral daily  senna 2 Tablet(s) Oral at bedtime    MEDICATIONS  (PRN):  acetaminophen     Tablet .. 650 milliGRAM(s) Oral every 6 hours PRN Temp greater or equal to 38C (100.4F), Mild Pain (1 - 3)            Vital Signs Last 24 Hrs  T(C): 38 (16 May 2022 08:00), Max: 38.3 (15 May 2022 16:00)  T(F): 100.4 (16 May 2022 08:00), Max: 101 (15 May 2022 16:00)  HR: 60 (16 May 2022 09:38) (60 - 73)  BP: 120/61 (16 May 2022 09:30) (89/50 - 148/68)  BP(mean): 74 (16 May 2022 09:30) (57 - 87)  RR: 17 (16 May 2022 09:38) (16 - 38)  SpO2: 94% (16 May 2022 09:38) (94% - 99%)            INTERPRETATION OF TELEMETRY: paced rhythm at 60-70s          LABS:                        11.8   7.64  )-----------( 152      ( 16 May 2022 01:09 )             36.9     05-16    139  |  109<H>  |  16  ----------------------------<  135<H>  3.9   |  21<L>  |  0.70    Ca    8.4      16 May 2022 01:09  Phos  2.9     05-16  Mg     2.10     05-16    TPro  5.4<L>  /  Alb  2.9<L>  /  TBili  0.7  /  DBili  x   /  AST  25  /  ALT  22  /  AlkPhos  63  05-16    CARDIAC MARKERS ( 16 May 2022 01:09 )  x     / x     / 74 U/L / x     / x              I&O's Summary    15 May 2022 07:01  -  16 May 2022 07:00  --------------------------------------------------------  IN: 1757 mL / OUT: 1265 mL / NET: 492 mL    16 May 2022 07:01  -  16 May 2022 09:55  --------------------------------------------------------  IN: 152.9 mL / OUT: 100 mL / NET: 52.9 mL      BNP  RADIOLOGY & ADDITIONAL STUDIES:      PHYSICAL EXAM:    GENERAL: ill and intubated,  HEART: Regular rate and rhythm; systolic murmurs, rubs, or gallops.  PULMONARY: mechanical breathing  ABDOMEN: Soft, Bowel sounds present  EXTREMITIES: Peripheral Pulses, No clubbing, cyanosis, or edema

## 2022-05-17 LAB
ALBUMIN SERPL ELPH-MCNC: 2.8 G/DL — LOW (ref 3.3–5)
ALP SERPL-CCNC: 58 U/L — SIGNIFICANT CHANGE UP (ref 40–120)
ALT FLD-CCNC: 19 U/L — SIGNIFICANT CHANGE UP (ref 4–33)
ANION GAP SERPL CALC-SCNC: 9 MMOL/L — SIGNIFICANT CHANGE UP (ref 7–14)
AST SERPL-CCNC: 20 U/L — SIGNIFICANT CHANGE UP (ref 4–32)
BASOPHILS # BLD AUTO: 0.03 K/UL — SIGNIFICANT CHANGE UP (ref 0–0.2)
BASOPHILS NFR BLD AUTO: 0.6 % — SIGNIFICANT CHANGE UP (ref 0–2)
BILIRUB SERPL-MCNC: 0.7 MG/DL — SIGNIFICANT CHANGE UP (ref 0.2–1.2)
BUN SERPL-MCNC: 16 MG/DL — SIGNIFICANT CHANGE UP (ref 7–23)
CALCIUM SERPL-MCNC: 8.8 MG/DL — SIGNIFICANT CHANGE UP (ref 8.4–10.5)
CHLORIDE SERPL-SCNC: 107 MMOL/L — SIGNIFICANT CHANGE UP (ref 98–107)
CO2 SERPL-SCNC: 23 MMOL/L — SIGNIFICANT CHANGE UP (ref 22–31)
CREAT SERPL-MCNC: 0.75 MG/DL — SIGNIFICANT CHANGE UP (ref 0.5–1.3)
EGFR: 75 ML/MIN/1.73M2 — SIGNIFICANT CHANGE UP
EOSINOPHIL # BLD AUTO: 0.17 K/UL — SIGNIFICANT CHANGE UP (ref 0–0.5)
EOSINOPHIL NFR BLD AUTO: 3.6 % — SIGNIFICANT CHANGE UP (ref 0–6)
GLUCOSE SERPL-MCNC: 98 MG/DL — SIGNIFICANT CHANGE UP (ref 70–99)
HCT VFR BLD CALC: 35.2 % — SIGNIFICANT CHANGE UP (ref 34.5–45)
HGB BLD-MCNC: 11 G/DL — LOW (ref 11.5–15.5)
IANC: 3.24 K/UL — SIGNIFICANT CHANGE UP (ref 1.8–7.4)
IMM GRANULOCYTES NFR BLD AUTO: 0.2 % — SIGNIFICANT CHANGE UP (ref 0–1.5)
LYMPHOCYTES # BLD AUTO: 0.72 K/UL — LOW (ref 1–3.3)
LYMPHOCYTES # BLD AUTO: 15.1 % — SIGNIFICANT CHANGE UP (ref 13–44)
MAGNESIUM SERPL-MCNC: 2.2 MG/DL — SIGNIFICANT CHANGE UP (ref 1.6–2.6)
MCHC RBC-ENTMCNC: 29.1 PG — SIGNIFICANT CHANGE UP (ref 27–34)
MCHC RBC-ENTMCNC: 31.3 GM/DL — LOW (ref 32–36)
MCV RBC AUTO: 93.1 FL — SIGNIFICANT CHANGE UP (ref 80–100)
MONOCYTES # BLD AUTO: 0.59 K/UL — SIGNIFICANT CHANGE UP (ref 0–0.9)
MONOCYTES NFR BLD AUTO: 12.4 % — SIGNIFICANT CHANGE UP (ref 2–14)
NEUTROPHILS # BLD AUTO: 3.24 K/UL — SIGNIFICANT CHANGE UP (ref 1.8–7.4)
NEUTROPHILS NFR BLD AUTO: 68.1 % — SIGNIFICANT CHANGE UP (ref 43–77)
NRBC # BLD: 0 /100 WBCS — SIGNIFICANT CHANGE UP
NRBC # FLD: 0 K/UL — SIGNIFICANT CHANGE UP
PHOSPHATE SERPL-MCNC: 3.1 MG/DL — SIGNIFICANT CHANGE UP (ref 2.5–4.5)
PLATELET # BLD AUTO: 123 K/UL — LOW (ref 150–400)
POTASSIUM SERPL-MCNC: 4.2 MMOL/L — SIGNIFICANT CHANGE UP (ref 3.5–5.3)
POTASSIUM SERPL-SCNC: 4.2 MMOL/L — SIGNIFICANT CHANGE UP (ref 3.5–5.3)
PROT SERPL-MCNC: 5.4 G/DL — LOW (ref 6–8.3)
RBC # BLD: 3.78 M/UL — LOW (ref 3.8–5.2)
RBC # FLD: 17 % — HIGH (ref 10.3–14.5)
SODIUM SERPL-SCNC: 139 MMOL/L — SIGNIFICANT CHANGE UP (ref 135–145)
WBC # BLD: 4.76 K/UL — SIGNIFICANT CHANGE UP (ref 3.8–10.5)
WBC # FLD AUTO: 4.76 K/UL — SIGNIFICANT CHANGE UP (ref 3.8–10.5)

## 2022-05-17 PROCEDURE — 99232 SBSQ HOSP IP/OBS MODERATE 35: CPT

## 2022-05-17 PROCEDURE — 99233 SBSQ HOSP IP/OBS HIGH 50: CPT

## 2022-05-17 RX ORDER — METOPROLOL TARTRATE 50 MG
12.5 TABLET ORAL ONCE
Refills: 0 | Status: COMPLETED | OUTPATIENT
Start: 2022-05-17 | End: 2022-05-17

## 2022-05-17 RX ORDER — LANOLIN ALCOHOL/MO/W.PET/CERES
3 CREAM (GRAM) TOPICAL AT BEDTIME
Refills: 0 | Status: DISCONTINUED | OUTPATIENT
Start: 2022-05-17 | End: 2022-05-20

## 2022-05-17 RX ORDER — ONDANSETRON 8 MG/1
4 TABLET, FILM COATED ORAL EVERY 8 HOURS
Refills: 0 | Status: DISCONTINUED | OUTPATIENT
Start: 2022-05-17 | End: 2022-05-25

## 2022-05-17 RX ORDER — METOPROLOL TARTRATE 50 MG
12.5 TABLET ORAL
Refills: 0 | Status: DISCONTINUED | OUTPATIENT
Start: 2022-05-17 | End: 2022-05-19

## 2022-05-17 RX ADMIN — ROBINUL 0.2 MILLIGRAM(S): 0.2 INJECTION INTRAMUSCULAR; INTRAVENOUS at 12:12

## 2022-05-17 RX ADMIN — Medication 12.5 MILLIGRAM(S): at 07:24

## 2022-05-17 RX ADMIN — Medication 81 MILLIGRAM(S): at 12:12

## 2022-05-17 RX ADMIN — PIPERACILLIN AND TAZOBACTAM 25 GRAM(S): 4; .5 INJECTION, POWDER, LYOPHILIZED, FOR SOLUTION INTRAVENOUS at 13:03

## 2022-05-17 RX ADMIN — HEPARIN SODIUM 5000 UNIT(S): 5000 INJECTION INTRAVENOUS; SUBCUTANEOUS at 05:51

## 2022-05-17 RX ADMIN — HEPARIN SODIUM 5000 UNIT(S): 5000 INJECTION INTRAVENOUS; SUBCUTANEOUS at 21:24

## 2022-05-17 RX ADMIN — AMIODARONE HYDROCHLORIDE 200 MILLIGRAM(S): 400 TABLET ORAL at 05:50

## 2022-05-17 RX ADMIN — ATORVASTATIN CALCIUM 40 MILLIGRAM(S): 80 TABLET, FILM COATED ORAL at 21:24

## 2022-05-17 RX ADMIN — POLYETHYLENE GLYCOL 3350 17 GRAM(S): 17 POWDER, FOR SOLUTION ORAL at 12:12

## 2022-05-17 RX ADMIN — PIPERACILLIN AND TAZOBACTAM 25 GRAM(S): 4; .5 INJECTION, POWDER, LYOPHILIZED, FOR SOLUTION INTRAVENOUS at 04:42

## 2022-05-17 RX ADMIN — Medication 12.5 MILLIGRAM(S): at 17:08

## 2022-05-17 RX ADMIN — SENNA PLUS 2 TABLET(S): 8.6 TABLET ORAL at 21:24

## 2022-05-17 RX ADMIN — HEPARIN SODIUM 5000 UNIT(S): 5000 INJECTION INTRAVENOUS; SUBCUTANEOUS at 14:30

## 2022-05-17 RX ADMIN — CHLORHEXIDINE GLUCONATE 1 APPLICATION(S): 213 SOLUTION TOPICAL at 06:16

## 2022-05-17 RX ADMIN — Medication 3 MILLIGRAM(S): at 21:22

## 2022-05-17 RX ADMIN — Medication 12.5 MILLIGRAM(S): at 03:10

## 2022-05-17 RX ADMIN — PIPERACILLIN AND TAZOBACTAM 25 GRAM(S): 4; .5 INJECTION, POWDER, LYOPHILIZED, FOR SOLUTION INTRAVENOUS at 21:09

## 2022-05-17 NOTE — CHART NOTE - NSCHARTNOTEFT_GEN_A_CORE
Critically ill patient requiring ABG to determine respiratory status.  This was an emergent procedure.  Patients radial artery was palpated/ visualized with US and cleaned with alcohol pad.   23g x 3/4" x 12" butterfly needed was inserted into the left/right radial artery with pulsatile flash.  One attempt was performed.  3cc of blood was obtained from patient.  Gauze pad was placed over site, needle withdrawn and firm pressure was held until complete hemostasis was achieved and band-aid was applied.  Patient tolerated procedure well with no complications.        [facility] RPA C [id number]

## 2022-05-17 NOTE — PROGRESS NOTE ADULT - ASSESSMENT
89yo R-handed woman with PMH of afib (reportedly on Xarelto), CABG, HTN, and HLD BIBEMS to the ED on 5/13/22 after being found down and unresponsive. LKN 5/11/22. Initial NIHSS 30 while sedated on propofol and 27f following reassessment off propofo. Pre-MRS 0. CTH w/o demonstrated a large MCA territory infarct. Patient was not a candidate for tPA or thrombectomy due to presentation outside the window with evidence of large CVA on CT. Extubated 5/16 and made DNR/DNI. Exam off sedation on 5/17 as above.     Impression: Global aphasia, right hemiparesis and likely right hemisensory loss due to left MCA territory infarct. Mechanism cardioembolic.      Recommendations:  [] Obtain repeat CTH w/o contrast  [] If repeat CTH stable and negative for hemorrhage, would resume anticoagulation   [] Atorvastatin 40-80mg qhs, titrate to ldl <70  [] PT/OT when able  [] Telemetry monitoring  [] BG <180, avoid hypoglycemia  [] Neuro checks and vital signs per unit protocol  [] Fall, aspiration, seizure precautions  [] Rest of care per primary team      Seen/discussed with stroke attending, Dr. Libman. 89yo R-handed woman with PMH of afib (reportedly on Xarelto), CABG, HTN, and HLD BIBEMS to the ED on 5/13/22 after being found down and unresponsive. LKN 5/11/22. Initial NIHSS 30 while sedated on propofol and 27f following reassessment off propofo. Pre-MRS 0. CTH w/o demonstrated a large MCA territory infarct. Patient was not a candidate for tPA or thrombectomy due to presentation outside the window with evidence of large CVA on CT. Extubated 5/16 and made DNR/DNI. Exam off sedation on 5/17 as above.     With respect to prognosis, functional outcome guarded at this time, and quality of life indeterminate and dependent on patient's wishes and family's goals of care.     Impression: Global aphasia, right hemiparesis and likely right hemisensory loss due to left MCA territory infarct. Mechanism cardioembolic.      Recommendations:  [] Obtain repeat CTH w/o contrast  [] If repeat CTH stable and negative for hemorrhage, would resume anticoagulation   [] Atorvastatin 40-80mg qhs, titrate to ldl <70  [] PT/OT when able  [] Telemetry monitoring  [] BG <180, avoid hypoglycemia  [] Neuro checks and vital signs per unit protocol  [] Fall, aspiration, seizure precautions  [] Rest of care per primary team      Seen/discussed with stroke attending, Dr. Libman. 89yo R-handed woman with PMH of afib (reportedly on Xarelto), CABG, HTN, and HLD BIBEMS to the ED on 5/13/22 after being found down and unresponsive. LKN 5/11/22. Initial NIHSS 30 while sedated on propofol and 27f following reassessment off propofo. Pre-MRS 0. CTH w/o demonstrated a large MCA territory infarct. Patient was not a candidate for tPA or thrombectomy due to presentation outside the window with evidence of large CVA on CT. Extubated 5/16 and made DNR/DNI. Exam off sedation on 5/17 as above.     With respect to prognosis, functional outcome guarded at this time, and quality of life indeterminate and dependent on patient's wishes and family's goals of care.     Impression: Global aphasia, left gaze deviation, right hemiparesis and likely right hemisensory loss due to left MCA territory infarct. Mechanism cardioembolic.      Recommendations:  [] Obtain repeat CTH w/o contrast  [] If repeat CTH stable and negative for hemorrhage, would resume anticoagulation   [] Atorvastatin 40-80mg qhs, titrate to ldl <70  [] PT/OT when able  [] Telemetry monitoring  [] BG <180, avoid hypoglycemia  [] Neuro checks and vital signs per unit protocol  [] Fall, aspiration, seizure precautions  [] Rest of care per primary team      Seen/discussed with stroke attending, Dr. Libman. 89yo R-handed woman with PMH of afib (reportedly on Xarelto), CABG, HTN, and HLD BIBEMS to the ED on 5/13/22 after being found down and unresponsive. LKN 5/11/22. Initial NIHSS 30 while sedated on propofol and 27f following reassessment off propofo. Pre-MRS 0. CTH w/o demonstrated a large MCA territory infarct. Patient was not a candidate for tPA or thrombectomy due to presentation outside the window with evidence of large CVA on CT. Extubated 5/16 and made DNR/DNI. Exam off sedation on 5/17 as above.     With respect to prognosis, functional outcome guarded at this time, and quality of life indeterminate and dependent on patient's wishes and family's goals of care.     Impression: Global aphasia, left gaze deviation, right hemiparesis and likely right hemisensory loss due to left MCA territory infarct. Mechanism likely cardioembolic related to AF.      Recommendations:  [] Obtain repeat CTH w/o contrast  [] If repeat CTH stable and negative for hemorrhage, would resume anticoagulation   [] Atorvastatin 40-80mg qhs, titrate to ldl <70  [] PT/OT when able  [] Telemetry monitoring  [] BG <180, avoid hypoglycemia  [] Neuro checks and vital signs per unit protocol  [] Fall, aspiration, seizure precautions  [] Rest of care per primary team      Seen/discussed with stroke attending, Dr. Libman.

## 2022-05-17 NOTE — PHYSICAL THERAPY INITIAL EVALUATION ADULT - PERTINENT HX OF CURRENT PROBLEM, REHAB EVAL
Pt is a 89 year old female presenting Pt is a 89 year old female presenting to ED after being found unresponsive at home; pt intubated and sedated in ED. CT head showed large subacute left MCA territory infarct without midline shift or mass effect.

## 2022-05-17 NOTE — PHYSICAL THERAPY INITIAL EVALUATION ADULT - ACTIVE RANGE OF MOTION EXAMINATION, REHAB EVAL
Unable to actively manipulate right UE and LE. Able to demonstrate activation of muscles in left hand, wrist, elbow, ankle, and knee

## 2022-05-17 NOTE — PROGRESS NOTE ADULT - SUBJECTIVE AND OBJECTIVE BOX
SUBJECTIVE/INTERVAL HISTORY: Patient seen and examined at bedside with Neurology attending and team. Extubated 5/16, NGT placed overnight. No spontaneous verbal output noted and she is not following commands at this time.       MEDICATIONS  (STANDING):  aMIOdarone    Tablet 200 milliGRAM(s) Oral daily  aspirin  chewable 81 milliGRAM(s) Oral daily  atorvastatin 40 milliGRAM(s) Oral at bedtime  chlorhexidine 4% Liquid 1 Application(s) Topical <User Schedule>  glycopyrrolate Injectable 0.2 milliGRAM(s) IV Push daily  heparin   Injectable 5000 Unit(s) SubCutaneous every 8 hours  LORazepam     Tablet 3 milliGRAM(s) Oral at bedtime  metoprolol tartrate 12.5 milliGRAM(s) Oral two times a day  phenylephrine    Infusion 1 MICROgram(s)/kG/Min (19.8 mL/Hr) IV Continuous <Continuous>  piperacillin/tazobactam IVPB.. 3.375 Gram(s) IV Intermittent every 8 hours  polyethylene glycol 3350 17 Gram(s) Oral daily  senna 2 Tablet(s) Oral at bedtime    MEDICATIONS  (PRN):  acetaminophen     Tablet .. 650 milliGRAM(s) Oral every 6 hours PRN Temp greater or equal to 38C (100.4F), Mild Pain (1 - 3)      Allergies  sulfa drugs (Unknown)      VITALS & EXAMINATION    Vital Signs Last 24 Hrs  T(C): 37.7 (17 May 2022 12:00), Max: 37.7 (17 May 2022 12:00)  T(F): 99.8 (17 May 2022 12:00), Max: 99.8 (17 May 2022 12:00)  HR: 89 (17 May 2022 12:00) (60 - 123)  BP: 133/67 (17 May 2022 12:00) (108/58 - 156/76)  BP(mean): 83 (17 May 2022 12:00) (63 - 95)  RR: 29 (17 May 2022 12:00) (17 - 35)  SpO2: 99% (17 May 2022 12:00) (92% - 100%)    Neurological  - Mental Status: Eyes open, but intermittently close at several points during the encounter. No response to verbal stimuli. Does not follow commands.  - Language: No spontaneous verbal output. Does not repeat.   Cranial Nerves: Pupils equal b/l 2mm, oculocephalic reflex intact, corneal reflex intact b/l, face symmetric, cough and gag deferred, rest of CN exam is limited by mental status.   - Motor:   LUE/LLE: spontaneous movement noted in the upper>lower extremity, withdraws both to noxious stimulation  RUE/RLE: No spontaneous movement noted, very slight withdrawal (elbow flexion) of the RUE to noxious stimuli, and minimal likely reflexive withdrawal to noxious stimuli in the RLE.  - Sensory: Grimace to noxious stimuli in LUE > RUE and LLE. No/minimal grimace to noxious stimulation in the RLE.    - Coordination: Patient unable to participate due to mental status.  - Gait: Patient unable to participate due to mental status.       LABS:    CBC                       11.0   4.76  )-----------( 123      ( 17 May 2022 05:29 )             35.2     Chem 05-17    139  |  107  |  16  ----------------------------<  98  4.2   |  23  |  0.75    Ca    8.8      17 May 2022 06:00  Phos  3.1     05-17  Mg     2.20     05-17    TPro  5.4<L>  /  Alb  2.8<L>  /  TBili  0.7  /  DBili  x   /  AST  20  /  ALT  19  /  AlkPhos  58  05-17    Coags   LFTs LIVER FUNCTIONS - ( 17 May 2022 06:00 )  Alb: 2.8 g/dL / Pro: 5.4 g/dL / ALK PHOS: 58 U/L / ALT: 19 U/L / AST: 20 U/L / GGT: x           Lipid Panel   A1c: 5.9 05-14  Cardiac enzymes CARDIAC MARKERS ( 16 May 2022 01:09 )  x     / x     / 74 U/L / x     / x          U/A     STUDIES & IMAGING    CTH w/o contrast (5/17):   Head CT: Mild right frontal scalp swelling. No acute intracranial hemorrhage, vasogenic edema, extra-axial collection or calvarial fracture. Large subacute left MCA territory infarct without significant mass effect or midline shift.   SUBJECTIVE/INTERVAL HISTORY: Patient seen and examined at bedside with Neurology attending and team. Extubated 5/16, NGT placed overnight. No spontaneous verbal output noted and she is not following commands at this time.       MEDICATIONS  (STANDING):  aMIOdarone    Tablet 200 milliGRAM(s) Oral daily  aspirin  chewable 81 milliGRAM(s) Oral daily  atorvastatin 40 milliGRAM(s) Oral at bedtime  chlorhexidine 4% Liquid 1 Application(s) Topical <User Schedule>  glycopyrrolate Injectable 0.2 milliGRAM(s) IV Push daily  heparin   Injectable 5000 Unit(s) SubCutaneous every 8 hours  LORazepam     Tablet 3 milliGRAM(s) Oral at bedtime  metoprolol tartrate 12.5 milliGRAM(s) Oral two times a day  phenylephrine    Infusion 1 MICROgram(s)/kG/Min (19.8 mL/Hr) IV Continuous <Continuous>  piperacillin/tazobactam IVPB.. 3.375 Gram(s) IV Intermittent every 8 hours  polyethylene glycol 3350 17 Gram(s) Oral daily  senna 2 Tablet(s) Oral at bedtime    MEDICATIONS  (PRN):  acetaminophen     Tablet .. 650 milliGRAM(s) Oral every 6 hours PRN Temp greater or equal to 38C (100.4F), Mild Pain (1 - 3)      Allergies  sulfa drugs (Unknown)      VITALS & EXAMINATION    Vital Signs Last 24 Hrs  T(C): 37.7 (17 May 2022 12:00), Max: 37.7 (17 May 2022 12:00)  T(F): 99.8 (17 May 2022 12:00), Max: 99.8 (17 May 2022 12:00)  HR: 89 (17 May 2022 12:00) (60 - 123)  BP: 133/67 (17 May 2022 12:00) (108/58 - 156/76)  BP(mean): 83 (17 May 2022 12:00) (63 - 95)  RR: 29 (17 May 2022 12:00) (17 - 35)  SpO2: 99% (17 May 2022 12:00) (92% - 100%)    Neurological  - Mental Status: Eyes open, but intermittently close at several points during the encounter. No response to verbal stimuli. Does not follow commands.  - Language: No spontaneous verbal output. Does not repeat.   Cranial Nerves: Left gaze deviation but intermittently crosses midline, blink to threat intact on the left and absent on the right, right nasolabial fold flattening, rest of CN exam is limited by mental status.   - Motor:   LUE/LLE: spontaneous movement noted in the upper>lower extremity, withdraws both to noxious stimulation  RUE/RLE: No spontaneous movement noted, very slight withdrawal (elbow flexion) of the RUE to noxious stimuli, and minimal likely reflexive withdrawal to noxious stimuli in the RLE.  - Sensory: Grimace to noxious stimuli in LUE > RUE and LLE. No/minimal grimace to noxious stimulation in the RLE.    - Coordination: Patient unable to participate due to mental status.  - Gait: Patient unable to participate due to mental status.       LABS:    CBC                       11.0   4.76  )-----------( 123      ( 17 May 2022 05:29 )             35.2     Chem 05-17    139  |  107  |  16  ----------------------------<  98  4.2   |  23  |  0.75    Ca    8.8      17 May 2022 06:00  Phos  3.1     05-17  Mg     2.20     05-17    TPro  5.4<L>  /  Alb  2.8<L>  /  TBili  0.7  /  DBili  x   /  AST  20  /  ALT  19  /  AlkPhos  58  05-17    Coags   LFTs LIVER FUNCTIONS - ( 17 May 2022 06:00 )  Alb: 2.8 g/dL / Pro: 5.4 g/dL / ALK PHOS: 58 U/L / ALT: 19 U/L / AST: 20 U/L / GGT: x           Lipid Panel   A1c: 5.9 05-14  Cardiac enzymes CARDIAC MARKERS ( 16 May 2022 01:09 )  x     / x     / 74 U/L / x     / x          U/A     STUDIES & IMAGING    CTH w/o contrast (5/17):   Head CT: Mild right frontal scalp swelling. No acute intracranial hemorrhage, vasogenic edema, extra-axial collection or calvarial fracture. Large subacute left MCA territory infarct without significant mass effect or midline shift.   SUBJECTIVE/INTERVAL HISTORY: Patient seen and examined at bedside with Neurology attending and team. Extubated 5/16, NGT placed overnight. No spontaneous verbal output noted and she is not following commands at this time.       MEDICATIONS  (STANDING):  aMIOdarone    Tablet 200 milliGRAM(s) Oral daily  aspirin  chewable 81 milliGRAM(s) Oral daily  atorvastatin 40 milliGRAM(s) Oral at bedtime  chlorhexidine 4% Liquid 1 Application(s) Topical <User Schedule>  glycopyrrolate Injectable 0.2 milliGRAM(s) IV Push daily  heparin   Injectable 5000 Unit(s) SubCutaneous every 8 hours  LORazepam     Tablet 3 milliGRAM(s) Oral at bedtime  metoprolol tartrate 12.5 milliGRAM(s) Oral two times a day  phenylephrine    Infusion 1 MICROgram(s)/kG/Min (19.8 mL/Hr) IV Continuous <Continuous>  piperacillin/tazobactam IVPB.. 3.375 Gram(s) IV Intermittent every 8 hours  polyethylene glycol 3350 17 Gram(s) Oral daily  senna 2 Tablet(s) Oral at bedtime    MEDICATIONS  (PRN):  acetaminophen     Tablet .. 650 milliGRAM(s) Oral every 6 hours PRN Temp greater or equal to 38C (100.4F), Mild Pain (1 - 3)      Allergies  sulfa drugs (Unknown)      VITALS & EXAMINATION    Vital Signs Last 24 Hrs  T(C): 37.7 (17 May 2022 12:00), Max: 37.7 (17 May 2022 12:00)  T(F): 99.8 (17 May 2022 12:00), Max: 99.8 (17 May 2022 12:00)  HR: 89 (17 May 2022 12:00) (60 - 123)  BP: 133/67 (17 May 2022 12:00) (108/58 - 156/76)  BP(mean): 83 (17 May 2022 12:00) (63 - 95)  RR: 29 (17 May 2022 12:00) (17 - 35)  SpO2: 99% (17 May 2022 12:00) (92% - 100%)    Neurological  - Mental Status: Eyes open, but intermittently close at several points during the encounter. No response to verbal stimuli. Does not follow commands.  - Language: No spontaneous verbal output. Does not repeat.   Cranial Nerves: Left gaze deviation but intermittently crosses midline, blink to threat intact on the left and absent on the right, right nasolabial fold flattening, rest of CN exam is limited by mental status.   - Motor:   LUE/LLE: spontaneous and purposeful movement noted in the upper>lower extremity, withdraws both to noxious stimulation  RUE/RLE: No spontaneous movement noted, very slight withdrawal (elbow flexion) of the RUE to noxious stimuli, and minimal likely reflexive withdrawal to noxious stimuli in the RLE.  - Sensory: Grimace to noxious stimuli in LUE > RUE and LLE. No/minimal grimace to noxious stimulation in the RLE.    - Coordination: Patient unable to participate due to mental status.  - Gait: Patient unable to participate due to mental status.       LABS:    CBC                       11.0   4.76  )-----------( 123      ( 17 May 2022 05:29 )             35.2     Chem 05-17    139  |  107  |  16  ----------------------------<  98  4.2   |  23  |  0.75    Ca    8.8      17 May 2022 06:00  Phos  3.1     05-17  Mg     2.20     05-17    TPro  5.4<L>  /  Alb  2.8<L>  /  TBili  0.7  /  DBili  x   /  AST  20  /  ALT  19  /  AlkPhos  58  05-17    Coags   LFTs LIVER FUNCTIONS - ( 17 May 2022 06:00 )  Alb: 2.8 g/dL / Pro: 5.4 g/dL / ALK PHOS: 58 U/L / ALT: 19 U/L / AST: 20 U/L / GGT: x           Lipid Panel   A1c: 5.9 05-14  Cardiac enzymes CARDIAC MARKERS ( 16 May 2022 01:09 )  x     / x     / 74 U/L / x     / x          U/A     STUDIES & IMAGING    CTH w/o contrast (5/17):   Head CT: Mild right frontal scalp swelling. No acute intracranial hemorrhage, vasogenic edema, extra-axial collection or calvarial fracture. Large subacute left MCA territory infarct without significant mass effect or midline shift.

## 2022-05-17 NOTE — PHYSICAL THERAPY INITIAL EVALUATION ADULT - DIAGNOSIS, PT EVAL
To be further determined upon completion of mobility. Pt presents with generalized weakness bilaterally with right side 0/5 strength and left side at least 2/5

## 2022-05-17 NOTE — PHYSICAL THERAPY INITIAL EVALUATION ADULT - ADDITIONAL COMMENTS
Information obtained from family at bedside as patient is not verbal at this time and obtunded. Pt lives in an apartment alone; + elevator. She was independent with mobility, self-care, and ADLs; no assistive devices utilized. Actively driving.

## 2022-05-17 NOTE — PROGRESS NOTE ADULT - ASSESSMENT
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 CT head showed large subacute left MCA territory infarct without midline shift or mass effect, neurology consulted and deemed no tPA or thrombectomy intervention. Patient was found in rapid Afib and SaO2 91% on BVM. She was intubated and sedated on propofol in ED. Episode of sustained VT with VR to 209 bpm noted and amiodarone IV load started. Currently Afib with rate controlled. EP is called for interrogation and consulted for rapid Afib /VT. Due to presentation, collateral obtained from family. Per family, patient lives at home alone and completed all ADL prior to event.       Rapid Afib and VT     RECOMMENDATIONS:  - Continuous telemetric monitoring for tachyarrhythmia--no event overnight  - Continue Amiodarone 200mg daily  - Recommend Lopressor 5mg IV q4h PRN if HR >110bpm if BP tolerates  - TTE showed LVEF 57%,dilated LA, mod pulm htn, mitral regurg  - Monitor electrolytes and replete K to 4 and Mg to 2  - Recommend AC (pt. is taking Xarelto at home) for thromboembolic ppx  given that patient has a [id number] score of 6 if no clinical contraindication and cleared by Neuro  - Pt. 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 CT head showed large subacute left MCA territory infarct without midline shift or mass effect, neurology consulted and deemed no tPA or thrombectomy intervention. Patient was found in rapid Afib and SaO2 91% on BVM. She was intubated and sedated on propofol in ED. Episode of sustained VT with VR to 209 bpm noted and amiodarone IV load started. Currently Afib with rate controlled. EP is called for interrogation and consulted for rapid Afib /VT. Due to presentation, collateral obtained from family. Pt. is intubated, on pressor and admitted to MICU for further management. Now, extubated and on 2L O2.       Rapid Afib and VT     RECOMMENDATIONS:  - Continuous telemetric monitoring for tachyarrhythmia--Afib rate controlled on tele  - Continue Amiodarone 200mg daily via NGT  - Recommend Lopressor 5mg IV q4h PRN if HR >110bpm if BP tolerates  - TTE showed LVEF 57%,dilated LA, mod pulm htn, mitral regurg  - Monitor electrolytes and replete K to 4 and Mg to 2  - Recommend AC (pt. is taking Xarelto at home) for thromboembolic ppx  given that patient has a [id number] score of 6 if no clinical contraindication and cleared by Neuro  - Pt. is extubated, on 2L O2 tolerates well. Continue care per primary team/MICU

## 2022-05-17 NOTE — PROGRESS NOTE ADULT - SUBJECTIVE AND OBJECTIVE BOX
Saumya Lozano MD, MS | PGY-2  Department of Internal Medicine  [phone] ([facility]) | [id number] ([facility])    MICU Progress Note    Interval Events:    Meds administered:  chlorhexidine 4% Liquid: 1 Application(s) Topical (05-17 @ 06:16)  heparin   Injectable: 5000 Unit(s) SubCutaneous (05-17 @ 05:51)  aMIOdarone    Tablet: 200 milliGRAM(s) Oral (05-17 @ 05:50)  piperacillin/tazobactam IVPB..: 25 mL/Hr IV Intermittent (05-17 @ 04:42)  metoprolol tartrate: 12.5 milliGRAM(s) Oral (05-17 @ 03:10)  senna: 2 Tablet(s) Oral (05-16 @ 22:37)  atorvastatin: 40 milliGRAM(s) Oral (05-16 @ 22:37)  LORazepam     Tablet: 3 milliGRAM(s) Oral (05-16 @ 22:37)  piperacillin/tazobactam IVPB..: 25 mL/Hr IV Intermittent (05-16 @ 21:33)  heparin   Injectable: 5000 Unit(s) SubCutaneous (05-16 @ 21:32)  metoprolol tartrate Injectable: 5 milliGRAM(s) IV Push (05-16 @ 20:00)  (ADM OVERRIDE): 1 Each &lt;See Task&gt; (05-16 @ 19:50)        REVIEW OF SYSTEMS:  [ ] Unable to assess ROS because ______  [ ] Negative except as stated in HPI  CONSTITUTIONAL: No fever, chills, night sweats, or fatigue  EYES: No eye pain, visual disturbances, or discharge  ENMT:  No difficulty hearing, tinnitus, vertigo; No sinus or throat pain  NECK: No pain or stiffness  BREASTS: No pain, masses, or nipple discharge  RESPIRATORY: No cough, wheezing, or hemoptysis; No shortness of breath  CARDIOVASCULAR: No chest pain, palpitations, dizziness, or leg swelling  GASTROINTESTINAL: No abdominal or epigastric pain. No nausea, vomiting, or hematemesis; No diarrhea or constipation. No melena or hematochezia.  GENITOURINARY: No dysuria, frequency, hematuria, or incontinence  NEUROLOGICAL: No headaches, memory loss, loss of strength, numbness, or tremors  SKIN: No itching, burning, rashes, or lesions   LYMPH NODES: No enlarged glands  ENDOCRINE: No heat or cold intolerance; No hair loss  MUSCULOSKELETAL: No joint pain or swelling; No muscle, back, or extremity pain  PSYCHIATRIC: No depression, anxiety, mood swings, or difficulty sleeping  HEME/LYMPH: No easy bruising, or bleeding gums  ALLERGY AND IMMUNOLOGIC: No hives or eczema    OBJECTIVE:  Vents:  Mode: standby    05-15 @ 07:01  -  05-16 @ 07:00  --------------------------------------------------------  IN: 1757 mL / OUT: 1265 mL / NET: 492 mL    05-16 @ 07:01  -  05-17 @ 06:49  --------------------------------------------------------  IN: 708.4 mL / OUT: 720 mL / NET: -11.6 mL      CAPILLARY BLOOD GLUCOSE          Drips:    Lines:    VITALS:  T(F): 98.9 (05-17-22 @ 04:00), Max: 100.4 (05-16-22 @ 08:00)  HR: 101 (05-17-22 @ 06:00) (60 - 123)  BP: 142/76 (05-17-22 @ 06:00) (94/50 - 156/76)  BP(mean): 92 (05-17-22 @ 06:00) (60 - 95)  ABP: --  ABP(mean): --  RR: 24 (05-17-22 @ 06:00) (16 - 35)  SpO2: 100% (05-17-22 @ 06:00) (92% - 100%)    PHYSICAL EXAM:  GENERAL: NAD, lying in bed comfortably  HEAD:  Atraumatic, Normocephalic  EYES: EOMI, PERRLA, conjunctiva and sclera clear  ENT: Moist mucous membranes  NECK: Supple, No JVD  CHEST/LUNG: Clear to auscultation bilaterally; No rales, rhonchi, wheezing, or rubs. Unlabored respirations  HEART: Regular rate and rhythm; No murmurs, rubs, or gallops  ABDOMEN: Bowel sounds present; Soft, Nontender, Nondistended. No hepatomegaly  EXTREMITIES:  2+ Peripheral Pulses, brisk capillary refill. No clubbing, cyanosis, or edema  NERVOUS SYSTEM:  Alert & Oriented X3, speech clear. No deficits   MSK: FROM all 4 extremities, full and equal strength  SKIN: No rashes or lesions    HOSPITAL MEDICATIONS:  Standing Meds:  aMIOdarone    Tablet 200 milliGRAM(s) Oral daily  aspirin  chewable 81 milliGRAM(s) Oral daily  atorvastatin 40 milliGRAM(s) Oral at bedtime  chlorhexidine 4% Liquid 1 Application(s) Topical <User Schedule>  glycopyrrolate Injectable 0.2 milliGRAM(s) IV Push daily  heparin   Injectable 5000 Unit(s) SubCutaneous every 8 hours  LORazepam     Tablet 3 milliGRAM(s) Oral at bedtime  metoprolol tartrate 12.5 milliGRAM(s) Oral two times a day  phenylephrine    Infusion 1 MICROgram(s)/kG/Min IV Continuous <Continuous>  piperacillin/tazobactam IVPB.. 3.375 Gram(s) IV Intermittent every 8 hours  polyethylene glycol 3350 17 Gram(s) Oral daily  senna 2 Tablet(s) Oral at bedtime      PRN Meds:  acetaminophen     Tablet .. 650 milliGRAM(s) Oral every 6 hours PRN      LABS:  CBC 05-17-22 @ 05:29                        11.0   4.76  )-----------( 123                   35.2       Hgb trend: 11.0 <-- , 11.2 <-- , 11.8 <-- , 11.7 <--   WBC trend: 4.76 <-- , 5.47 <-- , 7.64 <-- , 8.86 <--     CMP 05-16-22 @ 22:20    139  |  110<H>  |  15  ----------------------------<  96  4.4   |  20<L>  |  0.68    Ca    8.4      05-16-22 @ 22:20  Phos  2.8     05-16  Mg     2.10     05-16    TPro  5.4<L>  /  Alb  2.9<L>  /  TBili  0.7  /  DBili  x   /  AST  25  /  ALT  22  /  AlkPhos  63  05-16      Serum Cr trend: 0.68 <-- , 0.70 <-- , 0.78 <--       Arterial Blood Gas:  05-16 @ 22:20  7.48/31/64/23/93.4/0.4  ABG lactate: --        MICROBIOLOGY:       RADIOLOGY:  [ ] Reviewed and interpreted by me    EKG:   Saumya Lozano MD, MS | PGY-2  Department of Internal Medicine  [phone] ([facility]) | [id number] ([facility])    MICU Progress Note    Interval Events: overnight NGT placed, patient also placed in non-violent restraints.     Meds administered:  chlorhexidine 4% Liquid: 1 Application(s) Topical (05-17 @ 06:16)  heparin   Injectable: 5000 Unit(s) SubCutaneous (05-17 @ 05:51)  aMIOdarone    Tablet: 200 milliGRAM(s) Oral (05-17 @ 05:50)  piperacillin/tazobactam IVPB..: 25 mL/Hr IV Intermittent (05-17 @ 04:42)  metoprolol tartrate: 12.5 milliGRAM(s) Oral (05-17 @ 03:10)  senna: 2 Tablet(s) Oral (05-16 @ 22:37)  atorvastatin: 40 milliGRAM(s) Oral (05-16 @ 22:37)  LORazepam     Tablet: 3 milliGRAM(s) Oral (05-16 @ 22:37)  piperacillin/tazobactam IVPB..: 25 mL/Hr IV Intermittent (05-16 @ 21:33)  heparin   Injectable: 5000 Unit(s) SubCutaneous (05-16 @ 21:32)  metoprolol tartrate Injectable: 5 milliGRAM(s) IV Push (05-16 @ 20:00)  (ADM OVERRIDE): 1 Each &lt;See Task&gt; (05-16 @ 19:50)        REVIEW OF SYSTEMS:  [x] Unable to assess ROS because AOx1, but unable to verbalize at this time  [ ] Negative except as stated in HPI  CONSTITUTIONAL: No fever, chills, night sweats, or fatigue  EYES: No eye pain, visual disturbances, or discharge  ENMT:  No difficulty hearing, tinnitus, vertigo; No sinus or throat pain  NECK: No pain or stiffness  BREASTS: No pain, masses, or nipple discharge  RESPIRATORY: No cough, wheezing, or hemoptysis; No shortness of breath  CARDIOVASCULAR: No chest pain, palpitations, dizziness, or leg swelling  GASTROINTESTINAL: No abdominal or epigastric pain. No nausea, vomiting, or hematemesis; No diarrhea or constipation. No melena or hematochezia.  GENITOURINARY: No dysuria, frequency, hematuria, or incontinence  NEUROLOGICAL: No headaches, memory loss, loss of strength, numbness, or tremors  SKIN: No itching, burning, rashes, or lesions   LYMPH NODES: No enlarged glands  ENDOCRINE: No heat or cold intolerance; No hair loss  MUSCULOSKELETAL: No joint pain or swelling; No muscle, back, or extremity pain  PSYCHIATRIC: No depression, anxiety, mood swings, or difficulty sleeping  HEME/LYMPH: No easy bruising, or bleeding gums  ALLERGY AND IMMUNOLOGIC: No hives or eczema    OBJECTIVE:  Vents:  Mode: standby    05-15 @ 07:01  -  05-16 @ 07:00  --------------------------------------------------------  IN: 1757 mL / OUT: 1265 mL / NET: 492 mL    05-16 @ 07:01  -  05-17 @ 06:49  --------------------------------------------------------  IN: 708.4 mL / OUT: 720 mL / NET: -11.6 mL      CAPILLARY BLOOD GLUCOSE          Drips:    Lines:    VITALS:  T(F): 98.9 (05-17-22 @ 04:00), Max: 100.4 (05-16-22 @ 08:00)  HR: 101 (05-17-22 @ 06:00) (60 - 123)  BP: 142/76 (05-17-22 @ 06:00) (94/50 - 156/76)  BP(mean): 92 (05-17-22 @ 06:00) (60 - 95)  ABP: --  ABP(mean): --  RR: 24 (05-17-22 @ 06:00) (16 - 35)  SpO2: 100% (05-17-22 @ 06:00) (92% - 100%)    PHYSICAL EXAM:  GENERAL: NAD, lying in bed comfortably  HEAD:  Atraumatic, Normocephalic  EYES: PERRLA, conjunctiva and sclera clear  ENT: dry mucous membranes, NGT in place  NECK: Supple, No JVD  CHEST/LUNG: Clear to auscultation bilaterally; No rales, rhonchi, wheezing, or rubs. Unlabored respirations  HEART: Regular rate and rhythm; No murmurs, rubs, or gallops  ABDOMEN: Bowel sounds present; Soft, Nontender, Nondistended. No hepatomegaly  EXTREMITIES:  2+ Peripheral Pulses, brisk capillary refill. No clubbing, cyanosis, or edema  NERVOUS SYSTEM:  Alert & Oriented X1, follows commands  MSK: able to move LUE and LLE toes. No movement in RUE/RLE  SKIN: No rashes or lesions    HOSPITAL MEDICATIONS:  Standing Meds:  aMIOdarone    Tablet 200 milliGRAM(s) Oral daily  aspirin  chewable 81 milliGRAM(s) Oral daily  atorvastatin 40 milliGRAM(s) Oral at bedtime  chlorhexidine 4% Liquid 1 Application(s) Topical <User Schedule>  glycopyrrolate Injectable 0.2 milliGRAM(s) IV Push daily  heparin   Injectable 5000 Unit(s) SubCutaneous every 8 hours  LORazepam     Tablet 3 milliGRAM(s) Oral at bedtime  metoprolol tartrate 12.5 milliGRAM(s) Oral two times a day  phenylephrine    Infusion 1 MICROgram(s)/kG/Min IV Continuous <Continuous>  piperacillin/tazobactam IVPB.. 3.375 Gram(s) IV Intermittent every 8 hours  polyethylene glycol 3350 17 Gram(s) Oral daily  senna 2 Tablet(s) Oral at bedtime      PRN Meds:  acetaminophen     Tablet .. 650 milliGRAM(s) Oral every 6 hours PRN      LABS:  CBC 05-17-22 @ 05:29                        11.0   4.76  )-----------( 123                   35.2       Hgb trend: 11.0 <-- , 11.2 <-- , 11.8 <-- , 11.7 <--   WBC trend: 4.76 <-- , 5.47 <-- , 7.64 <-- , 8.86 <--     CMP 05-16-22 @ 22:20    139  |  110<H>  |  15  ----------------------------<  96  4.4   |  20<L>  |  0.68    Ca    8.4      05-16-22 @ 22:20  Phos  2.8     05-16  Mg     2.10     05-16    TPro  5.4<L>  /  Alb  2.9<L>  /  TBili  0.7  /  DBili  x   /  AST  25  /  ALT  22  /  AlkPhos  63  05-16      Serum Cr trend: 0.68 <-- , 0.70 <-- , 0.78 <--       Arterial Blood Gas:  05-16 @ 22:20  7.48/31/64/23/93.4/0.4  ABG lactate: --        MICROBIOLOGY:       RADIOLOGY:  [ ] Reviewed and interpreted by me    EKG:

## 2022-05-17 NOTE — PROGRESS NOTE ADULT - ASSESSMENT
Patient is a 89 y/o F w/ PMHx HTN, HLD, Afib on Xarelto, CAD s/p CABG presenting to the ED after being found unresponsive at home. CT head showed large subacute left MCA territory infarct without midline shift or mass effect concerning for CVA; Transferred to MICU for further management.       Neuro   - Intubated, sedated  - On propofol continue to wean, utilize precedex.    #Benzo dependence  - See ISTOP note. At home was taking tramadol and ativan. Was taking 3mg Ativan QD. Will resume here to avoid withdrawal.    #CVA  - CT non-Con: Large subacute left MCA territory infarct without significant mass effect or midline shift  -  Presentation concerning for ischemic stroke   - restarting ASA, will follow-up CK prior to restarting statin  - Neurology following, recs appreciated     CV  #Shock 2/2 neurogenic from sedation likely.  - On john Gtt    #Afib RVR  - tolerating PO amiodarone via OGT  - AICD interrogated with episodes of VT with rapid VR to 200s on 5/13/2022 noted  - CHADSVASC > 2, Patient previously on Xarelto, hold A/C at this time in setting of recent large CVA to avoid hemorraghic conversion     #CAD s/p CABG   - No recorded echo on file   - No indication for permissive hypertension per neuro   - F/U POCUS to assess volume status and LV function  - TTE w/ dilated LA, mod pulm htn, mitral regurg      Respiratory   - Intubated, volume control   - ABG: pH 7.3 Co2 51 O2 32 HCO3 25   - F/U ABG, titrate vent settings. CPAPed today 5/15 on 8/5, with decent lung volumes, but without much purposeful neurological activity.       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   - on NGT      Renal   #Rhabdomyolysis   - Scr .61 at baseline   - CK 1006, downtrending  - Gentle fluid administration, trend CK   - Strict I/O, avoid nephrotoxic agents  - Trend BMP     ID  - WBC 14 with neutrophilic predominance, improving   - CT chest: patchy opacities bilaterally 2/2 infection vs pulmonary edema   - Urinalysis neg  - Blood cultures x2 and Urine cultures NGTD drawn 5/14  - c/w zosyn for now, today is day 4 out of 5 for aspiration PNA    Endo   - No active issues     Heme   - hep subq    [facility]  - 5/16: MOLST form completed for DNR/DNI per son at bedside. Agrees with extubation in accordance with patient's wishes to "never be plugged into machines". Ativan and morphine IV PRN ordered for air hunger. Will continue to assess BP support, agreeable to making full comfort if unable to withdraw pressors.  Patient is a 89 y/o F w/ PMHx HTN, HLD, Afib on Xarelto, CAD s/p CABG presenting to the ED after being found unresponsive at home. CT head showed large subacute left MCA territory infarct without midline shift or mass effect concerning for CVA; Transferred to MICU for further management.       Neuro   - Intubated, off sedation now, AOx1 (AOx3 at baseline)    #Benzo dependence  - See ISTOP note. At home was taking tramadol and ativan. Was taking 3mg Ativan QD. Will resume here to avoid withdrawal.    #CVA  - CT non-Con: Large subacute left MCA territory infarct without significant mass effect or midline shift  -  Presentation concerning for ischemic stroke   - tolerating ASA and statin. Repeat CT ordered for today  - Neurology following, recs appreciated     CV  #Shock 2/2 neurogenic from sedation likely. Resolved, now off pressors     #Afib RVR  - tolerating PO amiodarone via OGT  - AICD interrogated with episodes of VT with rapid VR to 200s on 5/13/2022 noted  - CHADSVASC > 2, Patient previously on Xarelto, hold A/C at this time in setting of recent large CVA to avoid hemorraghic conversion     #CAD s/p CABG   - No recorded echo on file   - No indication for permissive hypertension per neuro   - F/U POCUS to assess volume status and LV function  - TTE w/ dilated LA, mod pulm htn, mitral regurg      Respiratory   -breathing well on RA      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 Agrees with extubation in accordance with patient's wishes to "never be plugged into machines". Ativan and morphine IV PRN ordered for air hunger. Will continue to assess BP support, agreeable to making full comfort if unable to withdraw pressors.

## 2022-05-17 NOTE — PHYSICAL THERAPY INITIAL EVALUATION ADULT - PLANNED THERAPY INTERVENTIONS, PT EVAL
bed mobility training/neuromuscular re-education/postural re-education/ROM/strengthening/transfer training

## 2022-05-17 NOTE — PHYSICAL THERAPY INITIAL EVALUATION ADULT - PATIENT PROFILE REVIEW, REHAB EVAL
PT initial evaluation received and chart review completed. Pt agreeable to participate in PT evaluation. Pt cleared by LOS Goddard./yes

## 2022-05-17 NOTE — PROGRESS NOTE ADULT - SUBJECTIVE AND OBJECTIVE BOX
Interval history:  + extubated, on 2L O2 with SaO2 98%  + NGT  Tele with Afib rate controlled      PAST MEDICAL & SURGICAL HISTORY:  No pertinent past medical history    S/P CABG x 1    Atrial fibrillation    Hypertension    Hyperlipemia    No significant past surgical history        MEDICATIONS  (STANDING):  aMIOdarone    Tablet 200 milliGRAM(s) Oral daily  aspirin  chewable 81 milliGRAM(s) Oral daily  atorvastatin 40 milliGRAM(s) Oral at bedtime  chlorhexidine 4% Liquid 1 Application(s) Topical <User Schedule>  glycopyrrolate Injectable 0.2 milliGRAM(s) IV Push daily  heparin   Injectable 5000 Unit(s) SubCutaneous every 8 hours  LORazepam     Tablet 3 milliGRAM(s) Oral at bedtime  metoprolol tartrate 12.5 milliGRAM(s) Oral two times a day  phenylephrine    Infusion 1 MICROgram(s)/kG/Min (19.8 mL/Hr) IV Continuous <Continuous>  piperacillin/tazobactam IVPB.. 3.375 Gram(s) IV Intermittent every 8 hours  polyethylene glycol 3350 17 Gram(s) Oral daily  senna 2 Tablet(s) Oral at bedtime    MEDICATIONS  (PRN):  acetaminophen     Tablet .. 650 milliGRAM(s) Oral every 6 hours PRN Temp greater or equal to 38C (100.4F), Mild Pain (1 - 3)            Vital Signs Last 24 Hrs  T(C): 37.2 (17 May 2022 08:00), Max: 37.6 (16 May 2022 16:00)  T(F): 99 (17 May 2022 08:00), Max: 99.7 (16 May 2022 16:00)  HR: 93 (17 May 2022 10:00) (60 - 123)  BP: 131/61 (17 May 2022 10:00) (100/50 - 156/76)  BP(mean): 77 (17 May 2022 10:00) (62 - 95)  RR: 27 (17 May 2022 10:00) (16 - 35)  SpO2: 97% (17 May 2022 10:00) (92% - 100%)            INTERPRETATION OF TELEMETRY: Afib with VR 70s-100s, intermittent Paced        LABS:                        11.0   4.76  )-----------( 123      ( 17 May 2022 05:29 )             35.2     05-17    139  |  107  |  16  ----------------------------<  98  4.2   |  23  |  0.75    Ca    8.8      17 May 2022 06:00  Phos  3.1     05-17  Mg     2.20     05-17    TPro  5.4<L>  /  Alb  2.8<L>  /  TBili  0.7  /  DBili  x   /  AST  20  /  ALT  19  /  AlkPhos  58  05-17    CARDIAC MARKERS ( 16 May 2022 01:09 )  x     / x     / 74 U/L / x     / x              I&O's Summary    16 May 2022 07:01  -  17 May 2022 07:00  --------------------------------------------------------  IN: 760.4 mL / OUT: 820 mL / NET: -59.6 mL    17 May 2022 07:01  -  17 May 2022 10:44  --------------------------------------------------------  IN: 110 mL / OUT: 250 mL / NET: -140 mL      BNP  RADIOLOGY & ADDITIONAL STUDIES:      PHYSICAL EXAM:    GENERAL: In no apparent distress, on 2L O2, occ. open eyes, not follow command  HEART: Irrgular rate and rhythm; systolic murmurs, rubs, or gallops.  PULMONARY: normal respiratory effort,  No rales, wheezing, or rhonchi bilaterally.  ABDOMEN: Soft, Nondistended; Bowel sounds present  EXTREMITIES: Peripheral Pulses present, No clubbing, cyanosis, or edema

## 2022-05-17 NOTE — CHART NOTE - NSCHARTNOTEFT_GEN_A_CORE
MAR Accept Note  Transfer to:  TELE  Accepting Attending Physician:  Dylan  Assigned Room:  530A    Patient seen and examined.   Labs and data reviewed.   No findings precluding transfer of service.       HPI/ICU COURSE:   Please refer to ICU transfer note for full details. Briefly, this is a 89F PMH HTN, HLD, Afib on Xarelto, CAD s/p CABG presenting to the ED after being found unresponsive at home, found to have large subacute left MCA territory infarct without midline shift or mass effect, intubated for airway protection. Managed in the MICU with vasopressors, antibiotics and amiodarone for Afib. Possible PNA thus started abx. Remained AOx1 off of sedation. Per GOC with family (made DNR/DNI), patient extubated on 5/16/22 successfully, protecting her airway and was liberated from vasopressors. NGT placed for TFs and oral medications. Started on ASA and statin due to stroke, pending repeat CT H before restarted AC. Started on beta blocker for rate control. Last day of antibiotics today.         FOR FOLLOW-UP:    Ca Rico MD  Internal Medicine PGY3/MAR  [facility]: [id number]  [facility]: [id number] MAR Accept Note  Transfer to:  TELE  Accepting Attending Physician:  Dylan  Assigned Room:  727A    Patient seen and examined.   Labs and data reviewed.   No findings precluding transfer of service.       HPI/ICU COURSE:   Please refer to ICU transfer note for full details. Briefly, this is a 89F PMH HTN, HLD, Afib on Xarelto, CAD s/p CABG presenting to the ED after being found unresponsive at home, found to have large subacute left MCA territory infarct without midline shift or mass effect, intubated for airway protection. Managed in the MICU with vasopressors, antibiotics and amiodarone for Afib. Possible PNA thus started abx. Remained AOx1 off of sedation. Per GOC with family (made DNR/DNI), patient extubated on 5/16/22 successfully, protecting her airway and was liberated from vasopressors. NGT placed for TFs and oral medications. Started on ASA and statin due to stroke, pending repeat CT H before restarted AC. Started on beta blocker for rate control. Last day of antibiotics today.         FOR FOLLOW-UP:    Ca Rico MD  Internal Medicine PGY3/MAR  [facility]: [id number]  [facility]: [id number]

## 2022-05-17 NOTE — PHYSICAL THERAPY INITIAL EVALUATION ADULT - MANUAL MUSCLE TESTING RESULTS, REHAB EVAL
Right UE and LE 0/5; Left ankle pumps 3/5, Left knee flexion 2/5; Left knee extension 2-/5, left  3/5; left elbow flexion and extension 2/5

## 2022-05-17 NOTE — CHART NOTE - NSCHARTNOTEFT_GEN_A_CORE
MICU Transfer Note    Transfer from: MICU    Transfer to: () Medicine    (X) Telemetry     ( ) RCU        ( ) Palliative         ( ) Stroke Unit          ( ) _________________    Accepting physician:      MICU COURSE:    89F PMH HTN, HLD, Afib on Xarelto, CAD s/p CABG presenting to the ED after being found unresponsive at home. On Tuesday night, her neighbors went to check on her and found her lying on the kitchen floor with swelling to the right side of the face. At the time, she was not responsive. Patient was brought in by EMS and was intubated and sedated in the ED. At this time, she responded to painful stimuli only. ED Vitals: /82, , RR  26, 91% on BVM. CT head showed large subacute left MCA territory infarct without midline shift or mass effect. Patient lives at home alone and completed all IADL and ADL prior to event. History limited due to presentation. Admitted to MICU for airway protection.    Managed in the MICU with vasopressors, antibiotics and amiodarone for Afib. Remained AOx1 off of sedation. Per GOC with family, patient extubated on 5/16/22 successfully, protecting her airway and was liberated from vasopressors today. NGT placed for TFs and oral medications. Started on ASA and statin due to stroke, pending repeat CT H before restarted AC. Started on beta blocker for rate control. Last day of antibiotics today.         ASSESSMENT & PLAN:   Patient is a 89 y/o F w/ PMHx HTN, HLD, Afib on Xarelto, CAD s/p CABG presenting to the ED after being found unresponsive at home. CT head showed large subacute left MCA territory infarct without midline shift or mass effect concerning for CVA; Transferred to MICU for further management.       Neuro   - Intubated, off sedation now, AOx1 (AOx3 at baseline)    #Benzo dependence  - See ISTOP note. At home was taking tramadol and ativan. Was taking 3mg Ativan QD. Will resume here to avoid withdrawal.    #CVA  - CT non-Con: Large subacute left MCA territory infarct without significant mass effect or midline shift  -  Presentation concerning for ischemic stroke   - tolerating ASA and statin. Repeat CT ordered for today  - Neurology following, recs appreciated     CV  #Shock 2/2 neurogenic from sedation likely. Resolved, now off pressors     #Afib RVR  - tolerating PO amiodarone via OGT  - AICD interrogated with episodes of VT with rapid VR to 200s on 5/13/2022 noted  - CHADSVASC > 2, Patient previously on Xarelto, hold A/C at this time in setting of recent large CVA to avoid hemorraghic conversion     #CAD s/p CABG   - No recorded echo on file   - No indication for permissive hypertension per neuro   - F/U POCUS to assess volume status and LV function  - TTE w/ dilated LA, mod pulm htn, mitral regurg      Respiratory   -breathing well on RA      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   [] TOV  [] follow-up EP recs for Afib  [] palliative consult for placement  [] PT consult when appropriate       Vitals  T(F): 99.1 (05-17-22 @ 16:00), Max: 99.8 (05-17-22 @ 12:00)  HR: 96 (05-17-22 @ 16:00) (60 - 116)  BP: 124/70 (05-17-22 @ 16:00) (108/58 - 145/77)  BP(mean): 79 (05-17-22 @ 16:00) (63 - 92)  ABP: --  ABP(mean): --  RR: 28 (05-17-22 @ 16:00) (17 - 30)  SpO2: 99% (05-17-22 @ 16:00) (92% - 100%)  I/O Summary 24H    IN: 760.4 mL / OUT: 820 mL / NET: -59.6 mL        MEDICATIONS  (STANDING):  aMIOdarone    Tablet 200 milliGRAM(s) Oral daily  aspirin  chewable 81 milliGRAM(s) Oral daily  atorvastatin 40 milliGRAM(s) Oral at bedtime  glycopyrrolate Injectable 0.2 milliGRAM(s) IV Push daily  heparin   Injectable 5000 Unit(s) SubCutaneous every 8 hours  LORazepam     Tablet 3 milliGRAM(s) Oral at bedtime  metoprolol tartrate 12.5 milliGRAM(s) Oral two times a day  phenylephrine    Infusion 1 MICROgram(s)/kG/Min (19.8 mL/Hr) IV Continuous <Continuous>  piperacillin/tazobactam IVPB.. 3.375 Gram(s) IV Intermittent every 8 hours  polyethylene glycol 3350 17 Gram(s) Oral daily  senna 2 Tablet(s) Oral at bedtime    MEDICATIONS  (PRN):  acetaminophen     Tablet .. 650 milliGRAM(s) Oral every 6 hours PRN Temp greater or equal to 38C (100.4F), Mild Pain (1 - 3)        LABS  CBC 05-17-22 @ 05:29                        11.0   4.76  )-----------( 123                   35.2       Hgb trend: 11.0 <-- , 11.2 <-- , 11.8 <-- , 11.7 <--   WBC trend: 4.76 <-- , 5.47 <-- , 7.64 <-- , 8.86 <--     CMP 05-17-22 @ 06:00    139  |  107  |  16  ----------------------------<  98  4.2   |  23  |  0.75    Ca    8.8      05-17-22 @ 06:00  Phos  3.1     05-17  Mg     2.20     05-17    TPro  5.4<L>  /  Alb  2.8<L>  /  TBili  0.7  /  DBili  x   /  AST  20  /  ALT  19  /  AlkPhos  58  05-17      Serum Cr trend: 0.75 <-- , 0.68 <-- , 0.70 <-- , 0.78 <--

## 2022-05-18 DIAGNOSIS — I25.10 ATHEROSCLEROTIC HEART DISEASE OF NATIVE CORONARY ARTERY WITHOUT ANGINA PECTORIS: ICD-10-CM

## 2022-05-18 DIAGNOSIS — Z29.9 ENCOUNTER FOR PROPHYLACTIC MEASURES, UNSPECIFIED: ICD-10-CM

## 2022-05-18 DIAGNOSIS — I48.20 CHRONIC ATRIAL FIBRILLATION, UNSPECIFIED: ICD-10-CM

## 2022-05-18 DIAGNOSIS — I63.512 CEREBRAL INFARCTION DUE TO UNSPECIFIED OCCLUSION OR STENOSIS OF LEFT MIDDLE CEREBRAL ARTERY: ICD-10-CM

## 2022-05-18 DIAGNOSIS — F41.9 ANXIETY DISORDER, UNSPECIFIED: ICD-10-CM

## 2022-05-18 DIAGNOSIS — J18.9 PNEUMONIA, UNSPECIFIED ORGANISM: ICD-10-CM

## 2022-05-18 LAB
ALBUMIN SERPL ELPH-MCNC: 3.2 G/DL — LOW (ref 3.3–5)
ALP SERPL-CCNC: 68 U/L — SIGNIFICANT CHANGE UP (ref 40–120)
ALT FLD-CCNC: 19 U/L — SIGNIFICANT CHANGE UP (ref 4–33)
ANION GAP SERPL CALC-SCNC: 12 MMOL/L — SIGNIFICANT CHANGE UP (ref 7–14)
AST SERPL-CCNC: 23 U/L — SIGNIFICANT CHANGE UP (ref 4–32)
BASOPHILS # BLD AUTO: 0.02 K/UL — SIGNIFICANT CHANGE UP (ref 0–0.2)
BASOPHILS NFR BLD AUTO: 0.3 % — SIGNIFICANT CHANGE UP (ref 0–2)
BILIRUB SERPL-MCNC: 0.7 MG/DL — SIGNIFICANT CHANGE UP (ref 0.2–1.2)
BUN SERPL-MCNC: 16 MG/DL — SIGNIFICANT CHANGE UP (ref 7–23)
CALCIUM SERPL-MCNC: 9.2 MG/DL — SIGNIFICANT CHANGE UP (ref 8.4–10.5)
CHLORIDE SERPL-SCNC: 102 MMOL/L — SIGNIFICANT CHANGE UP (ref 98–107)
CO2 SERPL-SCNC: 22 MMOL/L — SIGNIFICANT CHANGE UP (ref 22–31)
CREAT SERPL-MCNC: 0.71 MG/DL — SIGNIFICANT CHANGE UP (ref 0.5–1.3)
EGFR: 80 ML/MIN/1.73M2 — SIGNIFICANT CHANGE UP
EOSINOPHIL # BLD AUTO: 0.11 K/UL — SIGNIFICANT CHANGE UP (ref 0–0.5)
EOSINOPHIL NFR BLD AUTO: 1.8 % — SIGNIFICANT CHANGE UP (ref 0–6)
GLUCOSE SERPL-MCNC: 115 MG/DL — HIGH (ref 70–99)
HCT VFR BLD CALC: 39.2 % — SIGNIFICANT CHANGE UP (ref 34.5–45)
HGB BLD-MCNC: 12.3 G/DL — SIGNIFICANT CHANGE UP (ref 11.5–15.5)
IANC: 4.53 K/UL — SIGNIFICANT CHANGE UP (ref 1.8–7.4)
IMM GRANULOCYTES NFR BLD AUTO: 0.3 % — SIGNIFICANT CHANGE UP (ref 0–1.5)
LYMPHOCYTES # BLD AUTO: 0.7 K/UL — LOW (ref 1–3.3)
LYMPHOCYTES # BLD AUTO: 11.3 % — LOW (ref 13–44)
MAGNESIUM SERPL-MCNC: 2.2 MG/DL — SIGNIFICANT CHANGE UP (ref 1.6–2.6)
MCHC RBC-ENTMCNC: 29.2 PG — SIGNIFICANT CHANGE UP (ref 27–34)
MCHC RBC-ENTMCNC: 31.4 GM/DL — LOW (ref 32–36)
MCV RBC AUTO: 93.1 FL — SIGNIFICANT CHANGE UP (ref 80–100)
MONOCYTES # BLD AUTO: 0.81 K/UL — SIGNIFICANT CHANGE UP (ref 0–0.9)
MONOCYTES NFR BLD AUTO: 13.1 % — SIGNIFICANT CHANGE UP (ref 2–14)
NEUTROPHILS # BLD AUTO: 4.53 K/UL — SIGNIFICANT CHANGE UP (ref 1.8–7.4)
NEUTROPHILS NFR BLD AUTO: 73.2 % — SIGNIFICANT CHANGE UP (ref 43–77)
NRBC # BLD: 0 /100 WBCS — SIGNIFICANT CHANGE UP
NRBC # FLD: 0 K/UL — SIGNIFICANT CHANGE UP
PHOSPHATE SERPL-MCNC: 3.7 MG/DL — SIGNIFICANT CHANGE UP (ref 2.5–4.5)
PLATELET # BLD AUTO: 146 K/UL — LOW (ref 150–400)
POTASSIUM SERPL-MCNC: 4.2 MMOL/L — SIGNIFICANT CHANGE UP (ref 3.5–5.3)
POTASSIUM SERPL-SCNC: 4.2 MMOL/L — SIGNIFICANT CHANGE UP (ref 3.5–5.3)
PROT SERPL-MCNC: 6.2 G/DL — SIGNIFICANT CHANGE UP (ref 6–8.3)
RBC # BLD: 4.21 M/UL — SIGNIFICANT CHANGE UP (ref 3.8–5.2)
RBC # FLD: 16.2 % — HIGH (ref 10.3–14.5)
SODIUM SERPL-SCNC: 136 MMOL/L — SIGNIFICANT CHANGE UP (ref 135–145)
WBC # BLD: 6.19 K/UL — SIGNIFICANT CHANGE UP (ref 3.8–10.5)
WBC # FLD AUTO: 6.19 K/UL — SIGNIFICANT CHANGE UP (ref 3.8–10.5)

## 2022-05-18 PROCEDURE — 70450 CT HEAD/BRAIN W/O DYE: CPT | Mod: 26

## 2022-05-18 PROCEDURE — 99233 SBSQ HOSP IP/OBS HIGH 50: CPT

## 2022-05-18 PROCEDURE — 99232 SBSQ HOSP IP/OBS MODERATE 35: CPT

## 2022-05-18 RX ADMIN — ATORVASTATIN CALCIUM 40 MILLIGRAM(S): 80 TABLET, FILM COATED ORAL at 22:54

## 2022-05-18 RX ADMIN — Medication 12.5 MILLIGRAM(S): at 06:30

## 2022-05-18 RX ADMIN — Medication 2 MILLIGRAM(S): at 22:54

## 2022-05-18 RX ADMIN — HEPARIN SODIUM 5000 UNIT(S): 5000 INJECTION INTRAVENOUS; SUBCUTANEOUS at 06:31

## 2022-05-18 RX ADMIN — ROBINUL 0.2 MILLIGRAM(S): 0.2 INJECTION INTRAMUSCULAR; INTRAVENOUS at 12:50

## 2022-05-18 RX ADMIN — Medication 81 MILLIGRAM(S): at 12:51

## 2022-05-18 RX ADMIN — Medication 12.5 MILLIGRAM(S): at 18:27

## 2022-05-18 RX ADMIN — HEPARIN SODIUM 5000 UNIT(S): 5000 INJECTION INTRAVENOUS; SUBCUTANEOUS at 22:54

## 2022-05-18 RX ADMIN — HEPARIN SODIUM 5000 UNIT(S): 5000 INJECTION INTRAVENOUS; SUBCUTANEOUS at 15:12

## 2022-05-18 RX ADMIN — AMIODARONE HYDROCHLORIDE 200 MILLIGRAM(S): 400 TABLET ORAL at 06:31

## 2022-05-18 RX ADMIN — SENNA PLUS 2 TABLET(S): 8.6 TABLET ORAL at 22:54

## 2022-05-18 NOTE — PROGRESS NOTE ADULT - SUBJECTIVE AND OBJECTIVE BOX
Patient is a 89y old  Female who presents with a chief complaint of Found Down    SUBJECTIVE / OVERNIGHT EVENTS:    laying in bed, not able to verbalize, NGT in place, on 2L O2  cannot answer questions, does appear to follow basic commands, squeezed L hand    MEDICATIONS  (STANDING):  aMIOdarone    Tablet 200 milliGRAM(s) Oral daily  aspirin  chewable 81 milliGRAM(s) Oral daily  atorvastatin 40 milliGRAM(s) Oral at bedtime  glycopyrrolate Injectable 0.2 milliGRAM(s) IV Push daily  heparin   Injectable 5000 Unit(s) SubCutaneous every 8 hours  LORazepam     Tablet 3 milliGRAM(s) Oral at bedtime  metoprolol tartrate 12.5 milliGRAM(s) Oral two times a day  polyethylene glycol 3350 17 Gram(s) Oral daily  senna 2 Tablet(s) Oral at bedtime    MEDICATIONS  (PRN):  acetaminophen     Tablet .. 650 milliGRAM(s) Oral every 6 hours PRN Temp greater or equal to 38C (100.4F), Mild Pain (1 - 3)  aluminum hydroxide/magnesium hydroxide/simethicone Suspension 30 milliLiter(s) Oral every 4 hours PRN Dyspepsia  melatonin 3 milliGRAM(s) Oral at bedtime PRN Insomnia  ondansetron Injectable 4 milliGRAM(s) IV Push every 8 hours PRN Nausea and/or Vomiting    T(C): 37 (05-18-22 @ 11:28), Max: 37.2 (05-17-22 @ 20:00)  HR: 97 (05-18-22 @ 11:28) (96 - 104)  BP: 134/94 (05-18-22 @ 11:28) (123/61 - 146/80)  RR: 21 (05-18-22 @ 11:28) (18 - 30)  SpO2: 100% (05-18-22 @ 11:28) (99% - 100%)    I&O's Summary    17 May 2022 07:01  -  18 May 2022 07:00  --------------------------------------------------------  IN: 1010 mL / OUT: 1155 mL / NET: -145 mL    18 May 2022 07:01  -  18 May 2022 16:09  --------------------------------------------------------  IN: 320 mL / OUT: 310 mL / NET: 10 mL    PHYSICAL EXAM:  GENERAL: NAD  HEAD:  Atraumatic, Normocephalic  EYES: L gaze deviation   NECK: Supple, No JVD  CHEST/LUNG: Clear to auscultation bilaterally; No wheeze  HEART: irregular; No murmurs, rubs, or gallops  ABDOMEN: Soft, Nontender, Nondistended; Bowel sounds present  EXTREMITIES:   warm and well perfused, No clubbing, cyanosis, or edema  PSYCH: AAOx0, calm  NEUROLOGY: R arm seems hemiparetic, did withdraw twitch to pain in b/l LE, squeezed L hand, aphasic     LABS:                        12.3   6.19  )-----------( 146      ( 18 May 2022 05:50 )             39.2     05-18    136  |  102  |  16  ----------------------------<  115<H>  4.2   |  22  |  0.71    Ca    9.2      18 May 2022 05:50  Phos  3.7     05-18  Mg     2.20     05-18    TPro  6.2  /  Alb  3.2<L>  /  TBili  0.7  /  DBili  x   /  AST  23  /  ALT  19  /  AlkPhos  68  05-18    ABG - ( 16 May 2022 22:20 )  pH, Arterial: 7.48  pH, Blood: x     /  pCO2: 31    /  pO2: 64    / HCO3: 23    / Base Excess: 0.4   /  SaO2: 93.4      Microbiology: Culture Results:   No growth to date. (05-14 @ 01:50)  Culture Results:   No growth to date. (05-14 @ 00:15)  Culture Results:   No growth (05-13 @ 19:04)    Consultant(s) Notes Reviewed:  neuro, EP  Care Discussed with Consultants/Other Providers:

## 2022-05-18 NOTE — PROGRESS NOTE ADULT - SUBJECTIVE AND OBJECTIVE BOX
Interval history:  No overnight event  s/p extubation, on NC O2  Off sedation/ pressor  Tele with Afib       PAST MEDICAL & SURGICAL HISTORY:  No pertinent past medical history    S/P CABG x 1    Atrial fibrillation    Hypertension    Hyperlipemia    No significant past surgical history        MEDICATIONS  (STANDING):  aMIOdarone    Tablet 200 milliGRAM(s) Oral daily  aspirin  chewable 81 milliGRAM(s) Oral daily  atorvastatin 40 milliGRAM(s) Oral at bedtime  glycopyrrolate Injectable 0.2 milliGRAM(s) IV Push daily  heparin   Injectable 5000 Unit(s) SubCutaneous every 8 hours  LORazepam     Tablet 3 milliGRAM(s) Oral at bedtime  metoprolol tartrate 12.5 milliGRAM(s) Oral two times a day  polyethylene glycol 3350 17 Gram(s) Oral daily  senna 2 Tablet(s) Oral at bedtime    MEDICATIONS  (PRN):  acetaminophen     Tablet .. 650 milliGRAM(s) Oral every 6 hours PRN Temp greater or equal to 38C (100.4F), Mild Pain (1 - 3)  aluminum hydroxide/magnesium hydroxide/simethicone Suspension 30 milliLiter(s) Oral every 4 hours PRN Dyspepsia  melatonin 3 milliGRAM(s) Oral at bedtime PRN Insomnia  ondansetron Injectable 4 milliGRAM(s) IV Push every 8 hours PRN Nausea and/or Vomiting            Vital Signs Last 24 Hrs  T(C): 36.7 (18 May 2022 06:29), Max: 37.7 (17 May 2022 12:00)  T(F): 98.1 (18 May 2022 06:29), Max: 99.8 (17 May 2022 12:00)  HR: 99 (18 May 2022 06:29) (89 - 104)  BP: 144/91 (18 May 2022 06:29) (123/61 - 146/80)  BP(mean): 88 (17 May 2022 22:00) (76 - 97)  RR: 18 (18 May 2022 06:29) (18 - 30)  SpO2: 99% (18 May 2022 06:29) (97% - 100%)            INTERPRETATION OF TELEMETRY: Afib at 90s-110s        LABS:                        12.3   6.19  )-----------( 146      ( 18 May 2022 05:50 )             39.2     05-18    136  |  102  |  16  ----------------------------<  115<H>  4.2   |  22  |  0.71    Ca    9.2      18 May 2022 05:50  Phos  3.7     05-18  Mg     2.20     05-18    TPro  6.2  /  Alb  3.2<L>  /  TBili  0.7  /  DBili  x   /  AST  23  /  ALT  19  /  AlkPhos  68  05-18            I&O's Summary    17 May 2022 07:01  -  18 May 2022 07:00  --------------------------------------------------------  IN: 1010 mL / OUT: 1155 mL / NET: -145 mL      BNP  RADIOLOGY & ADDITIONAL STUDIES:      PHYSICAL EXAM:    GENERAL: In no apparent distress, open eyes and express eye/face response  HEART: Irregular rate and rhythm; Systolic murmurs, rubs, or gallops.  PULMONARY: Clear to auscultation and percussion.    ABDOMEN: Soft, Nondistended; Bowel sounds present  EXTREMITIES:  Peripheral Pulses, No clubbing, cyanosis, or edema  NEUROLOGICAL: + L facial drooping, + L hand movement noted

## 2022-05-18 NOTE — PROGRESS NOTE ADULT - ASSESSMENT
89F with HTN, HLD, Afib on Xarelto, CAD s/p CABG presenting to the ED 5/13 s/p being found unresponsive at home intubated in ED, CTH revealing large subacute left MCA territory infarct s/p MICU stay for vent and pressors course c/b afib w/ RVR now downgraded to floors.

## 2022-05-18 NOTE — PROGRESS NOTE ADULT - ASSESSMENT
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 CT head showed large subacute left MCA territory infarct without midline shift or mass effect, neurology consulted and deemed no tPA or thrombectomy intervention. Patient was found in rapid Afib and SaO2 91% on BVM. She was intubated and sedated on propofol in ED. Episode of sustained VT with VR to 209 bpm noted and amiodarone IV load started. Currently Afib with rate controlled. EP is called for interrogation and consulted for rapid Afib /VT. Due to presentation, collateral obtained from family. Pt. is intubated, on pressor and admitted to MICU for further management. Now, extubated, off pressor/sedation, and on 2L O2.       Rapid Afib and VT     RECOMMENDATIONS:  - Continuous telemetric monitoring for tachyarrhythmia--Afib rate controlled on tele  - Continue Amiodarone 200mg daily via NGT  - Continue Lopressor 12.5mg BID via NGT for goal HR <110s and uptitrate if BP tolerates    - TTE showed LVEF 57%,dilated LA, mod pulm htn, mitral regurg  - Monitor electrolytes and replete K to 4 and Mg to 2  - Recommend AC (pt. is taking Xarelto at home) for thromboembolic ppx  given that patient has a [id number] score of 6 if no clinical contraindication and cleared by Neuro ( Repeat CTH prior to restart AC per Neuro)  - Pt. is extubated, on 2L O2 tolerates well. DNR/DNI. Continue care per primary team

## 2022-05-18 NOTE — PROGRESS NOTE ADULT - PROBLEM SELECTOR PLAN 5
SQH  MOLST: DNR/DNI, per micu notes family considering comfort, no PEG will need to clarify if in fact the case given all meds currently via NGT - c/w ativan 3-->2 mg, plan to taper down

## 2022-05-18 NOTE — CHART NOTE - NSCHARTNOTEFT_GEN_A_CORE
Non-contrast CT of the Head reviewed with stroke attending, Dr. Libman. Report reviewed. Findings are as follows:    "Parenchymal volume loss and chronic microvascular ischemic changes are   identified.    Large area of low-attenuation is seen involving the left temporal frontal   parietal cortical and subcortical region with involvement of the left   basal ganglia and corona radiata region. This is compatible with a   subacute left MCA infarct. There is some areas of high attenuation   associated with this finding which could be compatible with spread   parenchymal versus underlying petechial hemorrhage. MRI can be done to   better characterize finding if clinically indicated and no   contraindications. Localized mass effect is seen consisting sulcal   effacement. Mass effect on left lateral ventricle seen. Nosignificant   shift or herniation seen.    Evaluation of osseous structures with the appropriate window appears   normal    Right-sided NG tube    Patient is status post bilateral cataract surgery and scleral banding.    IMPRESSION: Subacute left MCAinfarct.    --- End of Report ---    CAMILO STATON MD; Attending Radiologist  This document has been electronically signed. May 18 2022  4:47PM"      Impression: Global aphasia, left gaze deviation, right hemiparesis and likely right hemisensory loss due to left MCA territory infarct. Mechanism likely cardioembolic related to atrial fibrillation. Interval increase in size of hypodensity on CT may be consistent with a single ischemic episode with late, expanding edema and/or additional episode of infarction to same vascular territory.    Recommendations  [] No role for further neurovascular investigations at this time   [] Continue to hold anticoagulation. Would not restart until 10-14 days from event (up to 7 days from today) after repeat imaging is stable and if within goals of care of the family's wishes.   [] Keep BP within normotensive range, avoid hypotension  [] Consider Palliative Consult   [] Telemetry monitoring  [] BG <180, avoid hypoglycemia  [] NPO on tube feedings  [] Neuro checks and vital signs per unit protocol  [] Fall, aspiration, seizure precautions  [] Rest of care per primary team    Patient case discussed with stroke attending, Dr. Libman.    Please call with questions: [phone] Non-contrast CT of the Head reviewed with stroke attending, Dr. Libman. Report reviewed. Findings are as follows:    "Parenchymal volume loss and chronic microvascular ischemic changes are   identified.    Large area of low-attenuation is seen involving the left temporal frontal   parietal cortical and subcortical region with involvement of the left   basal ganglia and corona radiata region. This is compatible with a   subacute left MCA infarct. There is some areas of high attenuation   associated with this finding which could be compatible with spread   parenchymal versus underlying petechial hemorrhage. MRI can be done to   better characterize finding if clinically indicated and no   contraindications. Localized mass effect is seen consisting sulcal   effacement. Mass effect on left lateral ventricle seen. Nosignificant   shift or herniation seen.    Evaluation of osseous structures with the appropriate window appears   normal    Right-sided NG tube    Patient is status post bilateral cataract surgery and scleral banding.    IMPRESSION: Subacute left MCAinfarct.    --- End of Report ---    CAMILO STATON MD; Attending Radiologist  This document has been electronically signed. May 18 2022  4:47PM"      Impression: Global aphasia, left gaze deviation, right hemiparesis and likely right hemisensory loss due to left MCA territory infarct. Mechanism likely cardioembolic related to atrial fibrillation. Interval increase in size of hypodensity on CT may be consistent with a single ischemic episode with late, expanding edema and/or additional episode of infarction to same vascular territory.    Recommendations  [] No role for further neurovascular investigations at this time   [] Continue to hold anticoagulation. Would not restart until 10-14 days from event (up to 7 days from today) after repeat imaging is stable and if within goals of care of the family's wishes.   [] Keep BP within normotensive range, avoid hypotension  [] Consider Palliative Consult   [] Telemetry monitoring  [] BG <180, avoid hypoglycemia  [] NPO on tube feedings  [] Neuro checks and vital signs per unit protocol  [] Fall, aspiration, seizure precautions  [] Rest of care per primary team    Patient case discussed with stroke attending, Dr. Libman.    Please call with questions: [phone]    Stroke attending. Agree with above

## 2022-05-18 NOTE — PROGRESS NOTE ADULT - NS ATTEND AMEND GEN_ALL_CORE FT
89y old Female with a PMH of HTN, HLD, Afib ( reportedly on Xarelto), Medtronic dural chamber PPM 2/2 SND with AT/AF, CAD s/p CABG, BIBEM to the ED after being found down and unresponsive at home. CT head showed large subacute left MCA territory infarct without midline shift or mass effect, neurology consulted and deemed no tPA or thrombectomy intervention. Patient was found in rapid Afib and SaO2 91% on BVM. She was intubated and sedated on propofol in ED. Episode of sustained VT with VR to 209 bpm noted and amiodarone IV load started. Currently Afib with rate controlled. EP is called for interrogation and consulted for rapid Afib /VT. Due to presentation, collateral obtained from family. Pt. is intubated, on pressor and admitted to MICU for further management. Now, extubated, off pressor/sedation, and on 2L O2. Cont amio and BBlocker. Cont to follow on tele.
89y old Female with a PMH of HTN, HLD, Afib ( reportedly on Xarelto), Medtronic dural chamber PPM 2/2 SND with AT/AF, CAD s/p CABG, BIBEM to the ED after being found down and unresponsive at home. CT head showed large subacute left MCA territory infarct without midline shift or mass effect, neurology consulted and deemed no tPA or thrombectomy intervention. Patient was found in rapid Afib and SaO2 91% on BVM. She was intubated and sedated on propofol in ED. Episode of sustained VT with VR to 209 bpm noted and amiodarone IV load started. Currently Afib with rate controlled. EP is called for interrogation and consulted for rapid Afib /VT. Cont amio and BBlocker.
89y old Female with a PMH of HTN, HLD, Afib ( reportedly on Xarelto), Medtronic dural chamber PPM 2/2 SND with AT/AF, CAD s/p CABG, BIBEM to the ED after being found down and unresponsive at home. CT head showed large subacute left MCA territory infarct without midline shift or mass effect, neurology consulted and deemed no tPA or thrombectomy intervention. Patient was found in rapid Afib and SaO2 91% on BVM. She was intubated and sedated on propofol in ED. Episode of sustained VT with VR to 209 bpm noted and amiodarone IV load started. Currently Afib with rate controlled. EP is called for interrogation and consulted for rapid Afib /VT. Due to presentation, collateral obtained from family. Pt. is intubated, on pressor and admitted to MICU for further management. Now, extubated and on 2L O2. Cont amio and AC. Will follow.

## 2022-05-18 NOTE — CHART NOTE - NSCHARTNOTEFT_GEN_A_CORE
This report was requested by: Chloe Edwards | Reference #: [id number]  Patient Name: Makayla AmaroBirth Date: 05/29/1930  Address: [address] [id number]: Female  Rx Written	Rx Dispensed	Drug	Quantity	Days Supply	Prescriber Name	Prescriber Hilaria #	Payment Method	Dispenser  04/19/2022	04/20/2022	lorazepam 2 mg tablet	45	30	LaxBipin lambert MD	[id number]	Insurance	[facility]  03/18/2022	03/23/2022	tramadol hcl 50 mg tablet	90	30	Goldberg, Avram Z MD	[id number]	Insurance	[facility]  03/22/2022	03/23/2022	lorazepam 2 mg tablet	45	30	LaxBipin lambert MD	[id number]	Insurance	[facility]  01/10/2022	03/05/2022	tramadol hcl 50 mg tablet	21	7	Goldberg, Avram Z MD	[id number]	[place]	[facility]  01/10/2022	02/22/2022	tramadol hcl 50 mg tablet	21	7	Goldberg, Avram Z MD	[id number]	[place]	[facility]  02/22/2022	02/22/2022	lorazepam 2 mg tablet	45	30	LaxBipin lambert MD	[id number]	Insurance	[facility]  01/10/2022	02/10/2022	tramadol hcl 50 mg tablet	21	7	Goldberg, Avram Z MD	[id number]	Insurance	[facility]  01/10/2022	01/31/2022	tramadol hcl 50 mg tablet	21	7	Goldberg, Avram Z MD	[id number]	Insurance	[facility]  01/24/2022	01/25/2022	lorazepam 2 mg tablet	45	30	LaxBipin lambert MD	[id number]	Insurance	[facility]  01/10/2022	01/17/2022	tramadol hcl 50 mg tablet	21	7	Goldberg, Avram Z MD	[id number]	Insurance	[facility]  01/10/2022	01/10/2022	tramadol hcl 50 mg tablet	21	7	Goldberg, Avram Z MD	[id number]	Insurance	[facility]  12/27/2021	12/27/2021	lorazepam 2 mg tablet	45	30	LaxerBipin MD	[id number]	Insurance	[facility]  11/29/2021	11/29/2021	lorazepam 2 mg tablet	45	30	LaxerBipin MD	[id number]	Insurance	[facility]  11/26/2021	11/26/2021	lorazepam 2 mg tablet	3	2	LaxerBipin MD	[id number]	Insurance	[facility]  11/01/2021	11/02/2021	lorazepam 2 mg tablet	45	30	LaxerBipin MD	[id number]	Insurance	[facility]  10/04/2021	10/05/2021	lorazepam 2 mg tablet	45	30	LaxerBipin MD	[id number]	Insurance	[facility]  09/06/2021	09/08/2021	lorazepam 2 mg tablet	45	30	LaxerBipin MD	[id number]	Insurance	[facility]  08/11/2021	08/11/2021	lorazepam 2 mg tablet	45	30	LaxerBipin MD	[id number]	Insurance	[facility]  07/15/2021	07/16/2021	lorazepam 2 mg tablet	45	30	LaxerBipin MD	[id number]	Insurance	[facility]  05/19/2021	05/19/2021	lorazepam 2 mg tablet	45	30	Eduardo Gr MD	[id number]	Insurance	[facility]

## 2022-05-18 NOTE — PROGRESS NOTE ADULT - PROBLEM SELECTOR PLAN 1
History of atrial fib, cardioembolic vs. large artery atherosclerosis. Per neuro likely to have severe aphasia and severe right hemiparesis.  - appreciate neuro recs   - per neuro no indication for permissive HTN  - neuro checks   - tele  - hold ac given large CVA per neurology    - asa and statin via NGT  - PT/OT History of atrial fib, cardioembolic vs. large artery atherosclerosis. Per neuro likely to have severe aphasia and severe right hemiparesis.  - appreciate neuro recs   - per neuro no indication for permissive HTN  - per neuro repeat CTH, can resume ac if stable  - neuro checks   - tele  - asa and statin via NGT  - PT/OT

## 2022-05-19 DIAGNOSIS — Z71.89 OTHER SPECIFIED COUNSELING: ICD-10-CM

## 2022-05-19 DIAGNOSIS — Z51.5 ENCOUNTER FOR PALLIATIVE CARE: ICD-10-CM

## 2022-05-19 DIAGNOSIS — R13.10 DYSPHAGIA, UNSPECIFIED: ICD-10-CM

## 2022-05-19 DIAGNOSIS — R53.2 FUNCTIONAL QUADRIPLEGIA: ICD-10-CM

## 2022-05-19 DIAGNOSIS — R06.82 TACHYPNEA, NOT ELSEWHERE CLASSIFIED: ICD-10-CM

## 2022-05-19 DIAGNOSIS — G93.49 OTHER ENCEPHALOPATHY: ICD-10-CM

## 2022-05-19 LAB
ANION GAP SERPL CALC-SCNC: 11 MMOL/L — SIGNIFICANT CHANGE UP (ref 7–14)
BUN SERPL-MCNC: 15 MG/DL — SIGNIFICANT CHANGE UP (ref 7–23)
CALCIUM SERPL-MCNC: 9.5 MG/DL — SIGNIFICANT CHANGE UP (ref 8.4–10.5)
CHLORIDE SERPL-SCNC: 99 MMOL/L — SIGNIFICANT CHANGE UP (ref 98–107)
CO2 SERPL-SCNC: 25 MMOL/L — SIGNIFICANT CHANGE UP (ref 22–31)
CREAT SERPL-MCNC: 0.65 MG/DL — SIGNIFICANT CHANGE UP (ref 0.5–1.3)
CULTURE RESULTS: SIGNIFICANT CHANGE UP
CULTURE RESULTS: SIGNIFICANT CHANGE UP
EGFR: 83 ML/MIN/1.73M2 — SIGNIFICANT CHANGE UP
GLUCOSE SERPL-MCNC: 99 MG/DL — SIGNIFICANT CHANGE UP (ref 70–99)
HCT VFR BLD CALC: 39.4 % — SIGNIFICANT CHANGE UP (ref 34.5–45)
HGB BLD-MCNC: 12.5 G/DL — SIGNIFICANT CHANGE UP (ref 11.5–15.5)
MAGNESIUM SERPL-MCNC: 2.2 MG/DL — SIGNIFICANT CHANGE UP (ref 1.6–2.6)
MCHC RBC-ENTMCNC: 29.3 PG — SIGNIFICANT CHANGE UP (ref 27–34)
MCHC RBC-ENTMCNC: 31.7 GM/DL — LOW (ref 32–36)
MCV RBC AUTO: 92.3 FL — SIGNIFICANT CHANGE UP (ref 80–100)
NRBC # BLD: 0 /100 WBCS — SIGNIFICANT CHANGE UP
NRBC # FLD: 0 K/UL — SIGNIFICANT CHANGE UP
PLATELET # BLD AUTO: 149 K/UL — LOW (ref 150–400)
POTASSIUM SERPL-MCNC: 4.6 MMOL/L — SIGNIFICANT CHANGE UP (ref 3.5–5.3)
POTASSIUM SERPL-SCNC: 4.6 MMOL/L — SIGNIFICANT CHANGE UP (ref 3.5–5.3)
RBC # BLD: 4.27 M/UL — SIGNIFICANT CHANGE UP (ref 3.8–5.2)
RBC # FLD: 16.4 % — HIGH (ref 10.3–14.5)
SODIUM SERPL-SCNC: 135 MMOL/L — SIGNIFICANT CHANGE UP (ref 135–145)
SPECIMEN SOURCE: SIGNIFICANT CHANGE UP
SPECIMEN SOURCE: SIGNIFICANT CHANGE UP
WBC # BLD: 9.16 K/UL — SIGNIFICANT CHANGE UP (ref 3.8–10.5)
WBC # FLD AUTO: 9.16 K/UL — SIGNIFICANT CHANGE UP (ref 3.8–10.5)

## 2022-05-19 PROCEDURE — 99358 PROLONG SERVICE W/O CONTACT: CPT | Mod: NC

## 2022-05-19 PROCEDURE — 71045 X-RAY EXAM CHEST 1 VIEW: CPT | Mod: 26

## 2022-05-19 PROCEDURE — 99233 SBSQ HOSP IP/OBS HIGH 50: CPT

## 2022-05-19 PROCEDURE — 99497 ADVNCD CARE PLAN 30 MIN: CPT | Mod: 25

## 2022-05-19 PROCEDURE — 99223 1ST HOSP IP/OBS HIGH 75: CPT

## 2022-05-19 RX ORDER — METOPROLOL TARTRATE 50 MG
25 TABLET ORAL
Refills: 0 | Status: DISCONTINUED | OUTPATIENT
Start: 2022-05-20 | End: 2022-05-20

## 2022-05-19 RX ADMIN — ROBINUL 0.2 MILLIGRAM(S): 0.2 INJECTION INTRAMUSCULAR; INTRAVENOUS at 15:06

## 2022-05-19 RX ADMIN — HEPARIN SODIUM 5000 UNIT(S): 5000 INJECTION INTRAVENOUS; SUBCUTANEOUS at 15:07

## 2022-05-19 RX ADMIN — POLYETHYLENE GLYCOL 3350 17 GRAM(S): 17 POWDER, FOR SOLUTION ORAL at 15:06

## 2022-05-19 RX ADMIN — HEPARIN SODIUM 5000 UNIT(S): 5000 INJECTION INTRAVENOUS; SUBCUTANEOUS at 07:00

## 2022-05-19 RX ADMIN — AMIODARONE HYDROCHLORIDE 200 MILLIGRAM(S): 400 TABLET ORAL at 15:07

## 2022-05-19 RX ADMIN — Medication 81 MILLIGRAM(S): at 15:07

## 2022-05-19 RX ADMIN — Medication 12.5 MILLIGRAM(S): at 15:07

## 2022-05-19 NOTE — CONSULT NOTE ADULT - PROBLEM SELECTOR RECOMMENDATION 4
- continue with NC  - ongoing conversations with family to guide GOC; considering comfort care but would like some time to make decision. will continue to monitor tachypnea, if worsens, will address sx management is family agreeable CT brain: Large area of low-attenuation left temporal frontal   parietal cortical and subcortical region with involvement of the left   basal ganglia and corona radiata region. This is compatible with a   subacute left MCA infarct. There is some areas of high attenuation   associated with this finding which could be compatible with spread   parenchymal versus underlying petechial hemorrhage. MRI can be done to   better characterize finding if clinically indicated and no   contraindications. Localized mass effect is seen consisting sulcal   effacement. Mass effect on left lateral ventricle seen. No significant   shift or herniation seen.    - Medical management per primary and neurology team

## 2022-05-19 NOTE — OCCUPATIONAL THERAPY INITIAL EVALUATION ADULT - GENERAL OBSERVATIONS, REHAB EVAL
Patient received semisupine in bed in NAD. Okay to participate in OT evaluation per LOS Lorenzana. +tele. +left wrist restraint. +Jin. +NG tube.

## 2022-05-19 NOTE — OCCUPATIONAL THERAPY INITIAL EVALUATION ADULT - PERTINENT HX OF CURRENT PROBLEM, REHAB EVAL
89 year old female with history of HTN, HLD, Afib on Xarelto, CAD s/p CABG presenting to the ED s/p being found unresponsive at home. Intubated in ED. CTH revealing large subacute left MCA territory infarct s/p MICU stay.

## 2022-05-19 NOTE — CONSULT NOTE ADULT - PROBLEM SELECTOR RECOMMENDATION 5
Palliative team introduced, extensive conversation with family today; family is considering comfort care but would like some time to make decision. palliative will continue to follow    Son/surrogate: states was named as HCP, will bring paperwork  Irineo Hampton [phone]   Pt also has a daughter but is estranged from family and patient. Requested HCP paperwork from Irineo - source of stroke likely embolic per speciality teams  - management as per primary and Cardio EP team

## 2022-05-19 NOTE — OCCUPATIONAL THERAPY INITIAL EVALUATION ADULT - ANTICIPATED DISCHARGE DISPOSITION, OT EVAL
Will continue to follow for 1-2 more sessions to determine if patient is an appropriate rehab candidate. Patient unable to follow commands at this time.

## 2022-05-19 NOTE — GOALS OF CARE CONVERSATION - ADVANCED CARE PLANNING - CONVERSATION DETAILS
Met with patient's son, Mr. Irineo Hampton ([phone]) and Irineo's wife, Chelo at bedside this afternoon. Patient was unable to participate in encounter due to clinical status.     Palliative team introduced and role of symptom management, support, and facilitating goals of care/ACP outlined. Son and DIL Irineo and Chelo very thankful for consult and discussion. All questions answered regarding diagnosis and clinical status with hemiparesis, global aphasia, and deficits related to Left MCA stroke. Irineo demonstrates good understanding of poor prognosis and low probability of a meaningful recovery. Discussed patient removed NG tube this morning and that it was replaced which also required soft restraint to left wrist for safety and to limit the risk of further life-threatening injury.     Prior to presentation, patient lived at home independently, was able to take care of all ADLs, IADLs, driving, and enojye dher independence. She is ,  passed from MI several years ago. Patient has two adult children, Irineo who is actively involved in patient's care and reports he is in the HCP, POA etc, designated with  who has paperwork. Chelo, who knows patient for >48yrs ,states patient always said "when it is my time to go, let me go." Chelo knows patient would not want to "live on machines" and Irineo also reiterates that "she was very anxious to get that tube out" which prompted their decision to instill DNI/DNR orders. They want to ensure she is not suffering and they fear that she is in pain, she is unable to speak or eat, and they know that the most meaningful things to her was her ability to walk, talk and engage with her family. Patient has 2 grandchildren and 8 great-grands. Irineo and Chelo state if there is no chance for a meaningful recovery, they would consider comfort care but would like some time to think about it.     GOA at this time: continue with current management including NG tube; family will consider comfort care but needs time to make decision. Family is aware that certain medications are unable to be administered given NG status Met with patient's son, Mr. Irineo Hampton ([phone]) and Irineo's wife, Chelo at bedside this afternoon. Patient was unable to participate in encounter due to clinical status.     Palliative team introduced and role of symptom management, support, and facilitating goals of care/ACP outlined. Son and DIL Irineo and Chelo very thankful for consult and discussion. All questions answered regarding diagnosis and clinical status with hemiparesis, global aphasia, and deficits related to Left MCA stroke. Irineo demonstrates good understanding of poor prognosis and low probability of a meaningful recovery. Discussed patient removed NG tube this morning and that it was replaced which also required soft restraint to left wrist for safety and to limit the risk of further life-threatening injury.     Prior to presentation, patient lived at home independently, was able to take care of all ADLs, IADLs, driving, and enojye dher independence. She is ,  passed from MI several years ago. Patient has two adult children, Irineo who is actively involved in patient's care and reports he is in the HCP, POA etc, designated with  who has paperwork. Chelo, who knows patient for >48yrs ,states patient always said "when it is my time to go, let me go." Chelo knows patient would not want to "live on machines" and Irineo also reiterates that "she was very anxious to get that tube out" which prompted their decision to instill DNI/DNR orders. They want to ensure she is not suffering and they fear that she is in pain, she is unable to speak or eat, and they know that the most meaningful things to her was her ability to walk, talk and engage with her family. Patient has 2 grandchildren and 8 great-grands. Irineo and Chelo state if there is no chance for a meaningful recovery, they would consider comfort care but would like some time to think about it.     GOC at this time: continue with current management including NG tube; family will consider comfort care but needs time to make decision. Family is aware that certain medications are unable to be administered given NG status

## 2022-05-19 NOTE — PROGRESS NOTE ADULT - SUBJECTIVE AND OBJECTIVE BOX
Patient is a 89y old  Female who presents with a chief complaint of Found Down    SUBJECTIVE / OVERNIGHT EVENTS:    Patient appears more alert however somewhat agitated and grabbing with L hand  unable to express pain, follows basic commands inconsistently     MEDICATIONS  (STANDING):  aMIOdarone    Tablet 200 milliGRAM(s) Oral daily  aspirin  chewable 81 milliGRAM(s) Oral daily  atorvastatin 40 milliGRAM(s) Oral at bedtime  glycopyrrolate Injectable 0.2 milliGRAM(s) IV Push daily  heparin   Injectable 5000 Unit(s) SubCutaneous every 8 hours  LORazepam     Tablet 2 milliGRAM(s) Oral at bedtime  metoprolol tartrate 12.5 milliGRAM(s) Oral two times a day  polyethylene glycol 3350 17 Gram(s) Oral daily  senna 2 Tablet(s) Oral at bedtime    MEDICATIONS  (PRN):  acetaminophen     Tablet .. 650 milliGRAM(s) Oral every 6 hours PRN Temp greater or equal to 38C (100.4F), Mild Pain (1 - 3)  aluminum hydroxide/magnesium hydroxide/simethicone Suspension 30 milliLiter(s) Oral every 4 hours PRN Dyspepsia  melatonin 3 milliGRAM(s) Oral at bedtime PRN Insomnia  ondansetron Injectable 4 milliGRAM(s) IV Push every 8 hours PRN Nausea and/or Vomiting    T(C): 36.5 (05-19-22 @ 14:55), Max: 37 (05-18-22 @ 18:25)  HR: 100 (05-19-22 @ 14:55) (93 - 100)  BP: 147/94 (05-19-22 @ 14:55) (138/95 - 148/93)  RR: 18 (05-19-22 @ 14:55) (16 - 18)  SpO2: 98% (05-19-22 @ 14:55) (97% - 100%)    I&O's Summary    18 May 2022 07:01  -  19 May 2022 07:00  --------------------------------------------------------  IN: 640 mL / OUT: 1010 mL / NET: -370 mL    PHYSICAL EXAM:  GENERAL: NAD, +NGT  EYES: L gaze deviation   CHEST/LUNG: Clear to auscultation bilaterally; No wheeze  HEART: irregular; No murmurs, rubs, or gallops  ABDOMEN: Soft, Nontender, Nondistended; Bowel sounds present  EXTREMITIES:   warm and well perfused, No clubbing, cyanosis, or edema  PSYCH: AAOx0, more restless  NEUROLOGY: R arm seems hemiparetic, did withdraw twitch to pain in b/l LE, squeezed L hand, aphasic     LABS:                        12.5   9.16  )-----------( 149      ( 19 May 2022 07:45 )             39.4     05-19    135  |  99  |  15  ----------------------------<  99  4.6   |  25  |  0.65    Ca    9.5      19 May 2022 07:45  Phos  3.7     05-18  Mg     2.20     05-19    TPro  6.2  /  Alb  3.2<L>  /  TBili  0.7  /  DBili  x   /  AST  23  /  ALT  19  /  AlkPhos  68  05-18      Microbiology: Culture Results:   No Growth Final (05-14 @ 01:50)  Culture Results:   No Growth Final (05-14 @ 00:15)  Culture Results:   No growth (05-13 @ 19:04)    Consultant(s) Notes Reviewed:    Care Discussed with Consultants/Other Providers:

## 2022-05-19 NOTE — CONSULT NOTE ADULT - SUBJECTIVE AND OBJECTIVE BOX
HPI:  Patient is a 89 y/o F w/ PMHx HTN, HLD, Afib on Xarelto, CAD s/p CABG, s/p PPM presented to the ED after being found unresponsive at home, last known normal 5/11 PM. Pt found by her neighbors, found her lying on the kitchen floor with swelling to the right side of the face. Pt was unresponsive, brought into ED and was intubated and sedated for initial GCS of 30. CT head showed large subacute left MCA territory infarct without midline shift or mass effect. Patient lives at home alone and completed all IADL and ADL prior to event. History limited due to presentation. Etiology likely emobolic given history of Afib. Interval brain imaging shows progression of edema. Patient was extubated on 5/16; s/p NG tube on 5/7 which required replacement this morning as patient removed NG.     Patient seen at bedside this morning; She was not able to participate in encounter, lethargic, did not wake to verbal or tactile stimuli. Subsequently patient see this afternoon at bedside under nursing care. She required soft wrist restraint to right hand as she removed her NG tube this morning. Family seen at bedside, [facility] note reflects conversation.     PERTINENT PM/SXH:   No pertinent past medical history    S/P CABG x 1    Atrial fibrillation    Hypertension    Hyperlipemia      No significant past surgical history      FAMILY HISTORY:  No pertinent family history in first degree relatives      ITEMS NOT CHECKED ARE NOT PRESENT    SOCIAL HISTORY:   Significant other/partner[ ]  Children[x ]  Christian/Spirituality:  Substance hx:  [ ]   Tobacco hx:  [ ]   Alcohol hx: [ ]   Home Opioid hx:  [ ] I-Stop Reference No: see Istop cart note  Living Situation: [ ]Home  [ ]Long term care  [ ]Rehab [ ]Other    ADVANCE DIRECTIVES:    DNR  MOLST  [ x]  Living Will  [ ]   DECISION MAKER(s):  [x ] Health Care Proxy(s)  [x ] Surrogate(s)  [ ] Guardian           Name(s): Phone Number(s):  Irineo Hampton, son [phone]  DEBORAH Hampton [phone]    BASELINE (I)ADL(s) (prior to admission):  [place]: [x ]Total  [ ] Moderate [ ]Dependent    Allergies    sulfa drugs (Unknown)    Intolerances    MEDICATIONS  (STANDING):  aMIOdarone    Tablet 200 milliGRAM(s) Oral daily  aspirin  chewable 81 milliGRAM(s) Oral daily  atorvastatin 40 milliGRAM(s) Oral at bedtime  glycopyrrolate Injectable 0.2 milliGRAM(s) IV Push daily  heparin   Injectable 5000 Unit(s) SubCutaneous every 8 hours  LORazepam     Tablet 2 milliGRAM(s) Oral at bedtime  metoprolol tartrate 12.5 milliGRAM(s) Oral two times a day  polyethylene glycol 3350 17 Gram(s) Oral daily  senna 2 Tablet(s) Oral at bedtime    MEDICATIONS  (PRN):  acetaminophen     Tablet .. 650 milliGRAM(s) Oral every 6 hours PRN Temp greater or equal to 38C (100.4F), Mild Pain (1 - 3)  aluminum hydroxide/magnesium hydroxide/simethicone Suspension 30 milliLiter(s) Oral every 4 hours PRN Dyspepsia  melatonin 3 milliGRAM(s) Oral at bedtime PRN Insomnia  ondansetron Injectable 4 milliGRAM(s) IV Push every 8 hours PRN Nausea and/or Vomiting    PRESENT SYMPTOMS: [x ]Unable to obtain due to poor mentation   Source if other than patient:  [x ]Family   [x ]Team     SOB, tachypnea    CPOT:    https://www.[facility].org/getattachment/[email or web address]/Critical-Care-Pain-Observation-Tool-(CPOT)      PAIN AD Score:     http://geriatrictoolkit.[facility]/cog/painad.pdf (press ctrl +  left click to view)    Dyspnea:                           [ ]Mild [x ]Moderate [ ]Severe  Anxiety:                             [ ]Mild [x ]Moderate [ ]Severe  Fatigue:                             [ ]Mild [ ]Moderate [x ]Severe  Nausea: unable to determine                            [ ]Mild [ ]Moderate [ ]Severe  Loss of appetite: unable to determine         [ ]Mild [ ]Moderate [ ]Severe    Other Symptoms:  [ ]All other review of systems negative     Palliative Performance Status Version 2:     10    %    http://npcrc.org/files/news/palliative_performance_scale_ppsv2.pdf  PHYSICAL EXAM:  Vital Signs Last 24 Hrs  T(C): 37 (19 May 2022 11:41), Max: 37 (18 May 2022 18:25)  T(F): 98.6 (19 May 2022 11:41), Max: 98.6 (18 May 2022 18:25)  HR: 94 (19 May 2022 11:41) (93 - 97)  BP: 140/85 (19 May 2022 11:41) (138/95 - 148/93)  BP(mean): --  RR: 17 (19 May 2022 11:41) (16 - 18)  SpO2: 98% (19 May 2022 11:41) (97% - 100%) I&O's Summary    18 May 2022 07:01  -  19 May 2022 07:00  --------------------------------------------------------  IN: 640 mL / OUT: 1010 mL / NET: -370 mL      GENERAL:  [ ]Alert  [ ]Oriented    [x ]Lethargic  [ ]Cachexia  [x ]Unarousable  [ ]Verbal  [ ]Non-Verbal  Behavioral:   [ ] Anxiety  [ ] Delirium [ ] Agitation [ ] Other  HEENT: NG TUBE  [ ]Normal   [ x]Dry mouth   [ ]ET Tube/Trach  [ ]Oral lesions  PULMONARY:   [ ]Clear [x ]Tachypnea  [ x]Audible excessive secretions   [ ]Rhonchi        [ ]Right [ ]Left [ ]Bilateral  [ ]Crackles        [ ]Right [ ]Left [ ]Bilateral  [ ]Wheezing     [ ]Right [ ]Left [ ]Bilateral  [ ]Diminished breath sounds [ ]right [ ]left [ ]bilateral  CARDIOVASCULAR:    [ ]Regular [ ]Irregular [x ]Tachy  [ ]Clyde [ ]Murmur [ ]Other  GASTROINTESTINAL:  [x ]Soft  [ ]Distended   [ ]+BS  [ ]Non tender [ ]Tender  [ ]PEG [x ] NGT   GENITOURINARY:  [ ]Normal [ ] Incontinent   [ ]Oliguria/Anuria   [x ]Jin clear urine  MUSCULOSKELETAL:   [ ]Normal   [ ]Weakness  [x ]Bed/Wheelchair bound [ ]Edema  NEUROLOGIC: right hemiparesis; downward gaze; did not respond to painful stimuli on current examination; did not respond to verbal cues; no purposefl movements noted; left hand in restraint as patient reportedly removed NG tube early this morning; global aphasia;   [ ]No focal deficits  [ ]Cognitive impairment  [ ]Dysphagia [ ]Dysarthria [x ]Paresis [ ]Other   SKIN:   [ ]Normal    [ ]Rash  [ ]Pressure ulcer(s)       Present on admission [ ]y [ ]n    LABS:                        12.5   9.16  )-----------( 149      ( 19 May 2022 07:45 )             39.4   05-19    135  |  99  |  15  ----------------------------<  99  4.6   |  25  |  0.65    Ca    9.5      19 May 2022 07:45  Phos  3.7     05-18  Mg     2.20     05-19    TPro  6.2  /  Alb  3.2<L>  /  TBili  0.7  /  DBili  x   /  AST  23  /  ALT  19  /  AlkPhos  68  05-18        RADIOLOGY & ADDITIONAL STUDIES:    PROTEIN CALORIE MALNUTRITION PRESENT: [ ]mild [ ]moderate [ ]severe [ ]underweight [ ]morbid obesity  https://www.andeal.org/vault/2440/web/files/ONC/Table_Clinical%20Characteristics%20to%20Document%20Malnutrition-White%20JV%20et%20al%202012.pdf    Height (cm): 165.1 (05-13-22 @ 14:31), 165.1 (11-30-21 @ 20:42)  Weight (kg): 52.8 (05-13-22 @ 18:40), 50 (11-30-21 @ 23:40)  BMI (kg/m2): 19.4 (05-13-22 @ 18:40), 18.3 (05-13-22 @ 14:31), 18.3 (11-30-21 @ 23:40)    [x ]PPSV2 < or = to 30% [ ]significant weight loss  [x ]poor nutritional intake  [ ]anasarca      [x ]Artificial Nutrition      REFERRALS:   [ ]Chaplaincy  [ ]Hospice  [ ]Child Life  [x ]Social Work  [x ]Case management [ ]Holistic Therapy     Goals of Care Document: SEE GOC NOTE FROM TODAY (5/19) HPI:  Patient is a 89 y/o F w/ PMHx HTN, HLD, Afib on Xarelto, CAD s/p CABG, s/p PPM presented to the ED after being found unresponsive at home, last known normal 5/11 PM. Pt found by her neighbors, found her lying on the kitchen floor with swelling to the right side of the face. Pt was unresponsive, brought into ED and was intubated and sedated for initial GCS of 30. CT head showed large subacute left MCA territory infarct without midline shift or mass effect. Patient lives at home alone and completed all IADL and ADL prior to event. History limited due to presentation. Etiology likely emobolic given history of Afib. Interval brain imaging shows progression of edema. Patient was extubated on 5/16; s/p NG tube on 5/7 which required replacement this morning as patient removed NG.     Interval History:  Patient seen at bedside this morning; She was not able to participate in encounter, lethargic, did not wake to verbal or tactile stimuli. Subsequently patient see this afternoon at bedside under nursing care. She required soft wrist restraint to right hand as she removed her NG tube this morning. Family seen at bedside, [facility] note reflects conversation.     PERTINENT PM/SXH:   No pertinent past medical history  S/P CABG x 1  Atrial fibrillation  Hypertension  Hyperlipemia  No significant past surgical history    FAMILY HISTORY: unable to obtain due to encephalopathy     ITEMS NOT CHECKED ARE NOT PRESENT    SOCIAL HISTORY:   Significant other/partner[ ]  Children[x ]  Baptist/Spirituality: Pentecostalism  Substance hx:  [ ]   Tobacco hx:  [ ]   Alcohol hx: [ ]   Home Opioid hx:  [ ] I-Stop Reference No: see Istop chart note  Living Situation: [ ]Home  [ ]Long term care  [ ]Rehab [ ]Other    ADVANCE DIRECTIVES:    DNR  MOLST  [ x]  Living Will  [ ]   DECISION MAKER(s):  [x ] Health Care Proxy(s)  [x ] Surrogate(s)  [ ] Guardian           Name(s): Phone Number(s):  Irineo Hampton, son [phone]  DEBORAH Hampton [phone]    BASELINE (I)ADL(s) (prior to admission):  [place]: [x ]Total  [ ] Moderate [ ]Dependent    Allergies    sulfa drugs (Unknown)    Intolerances    MEDICATIONS  (STANDING):  aMIOdarone    Tablet 200 milliGRAM(s) Oral daily  aspirin  chewable 81 milliGRAM(s) Oral daily  atorvastatin 40 milliGRAM(s) Oral at bedtime  glycopyrrolate Injectable 0.2 milliGRAM(s) IV Push daily  heparin   Injectable 5000 Unit(s) SubCutaneous every 8 hours  LORazepam     Tablet 2 milliGRAM(s) Oral at bedtime  metoprolol tartrate 12.5 milliGRAM(s) Oral two times a day  polyethylene glycol 3350 17 Gram(s) Oral daily  senna 2 Tablet(s) Oral at bedtime    MEDICATIONS  (PRN):  acetaminophen     Tablet .. 650 milliGRAM(s) Oral every 6 hours PRN Temp greater or equal to 38C (100.4F), Mild Pain (1 - 3)  aluminum hydroxide/magnesium hydroxide/simethicone Suspension 30 milliLiter(s) Oral every 4 hours PRN Dyspepsia  melatonin 3 milliGRAM(s) Oral at bedtime PRN Insomnia  ondansetron Injectable 4 milliGRAM(s) IV Push every 8 hours PRN Nausea and/or Vomiting    PRESENT SYMPTOMS: [x ]Unable to obtain due to poor mentation   Source if other than patient:  [x ]Family   [x ]Team     SOB, tachypnea    CPOT:    https://www.[facility].org/getattachment/[email or web address]/Critical-Care-Pain-Observation-Tool-(CPOT)      PAIN AD Score: 1    http://geriatrictoolkit.[facility]/cog/painad.pdf (press ctrl +  left click to view)    Dyspnea:                           [ x]Mild [ ]Moderate [ ]Severe  Anxiety:                             [ ]Mild [ ]Moderate [ ]Severe  Fatigue:                             [ ]Mild [ ]Moderate [ ]Severe  Nausea:                            [ ]Mild [ ]Moderate [ ]Severe  Loss of appetite:         [ ]Mild [ ]Moderate [ ]Severe    Other Symptoms:  [ ]All other review of systems negative - unable to obtain due to encephalopathy     Palliative Performance Status Version 2:     20    %    http://npcrc.org/files/news/palliative_performance_scale_ppsv2.pdf    PHYSICAL EXAM:  Vital Signs Last 24 Hrs  T(C): 37 (19 May 2022 11:41), Max: 37 (18 May 2022 18:25)  T(F): 98.6 (19 May 2022 11:41), Max: 98.6 (18 May 2022 18:25)  HR: 94 (19 May 2022 11:41) (93 - 97)  BP: 140/85 (19 May 2022 11:41) (138/95 - 148/93)  BP(mean): --  RR: 17 (19 May 2022 11:41) (16 - 18)  SpO2: 98% (19 May 2022 11:41) (97% - 100%) I&O's Summary    18 May 2022 07:01  -  19 May 2022 07:00  --------------------------------------------------------  IN: 640 mL / OUT: 1010 mL / NET: -370 mL      GENERAL:  [ ]Alert  [ ]Oriented    [x ]Lethargic  [ ]Cachexia  [x ]Unarousable  [ ]Verbal  [ x]Non-Verbal  Behavioral:   [ ] Anxiety  [ ] Delirium [ ] Agitation [x ] Other- encephalopathy  HEENT: NG TUBE  [ ]Normal   [ x]Dry mouth   [ ]ET Tube/Trach  [ ]Oral lesions  PULMONARY:   [ ]Clear [x ]Tachypnea  [ x]Audible excessive secretions   [ ]Rhonchi        [ ]Right [ ]Left [ ]Bilateral  [ ]Crackles        [ ]Right [ ]Left [ ]Bilateral  [ ]Wheezing     [ ]Right [ ]Left [ ]Bilateral  [ x]Diminished breath sounds [ ]right [ ]left [ x]bilateral  CARDIOVASCULAR:    [ ]Regular [ ]Irregular [x ]Tachy  [ ]Clyde [ ]Murmur [ ]Other  GASTROINTESTINAL:  [x ]Soft  [ ]Distended   [ ]+BS  [x ]Non tender [ ]Tender  [ ]PEG [x ] NGT Last Bowel Movement: 5/18  GENITOURINARY:  [ ]Normal [ ] Incontinent   [ ]Oliguria/Anuria   [x ]Jin clear urine  MUSCULOSKELETAL:   [ ]Normal   [ ]Weakness  [x ]Bed/Wheelchair bound [ ]Edema  NEUROLOGIC: right hemiparesis; downward gaze; did not respond to painful stimuli on current examination; did not respond to verbal cues; no purposeful movements noted; left hand in restraint as patient reportedly removed NG tube early this morning; global aphasia;   [ ]No focal deficits  [ ]Cognitive impairment  [x ]Dysphagia [ ]Dysarthria [x ]Paresis [x ]Other - encephalopathy  SKIN:   [ ]Normal    [ ]Rash  [ x]Pressure ulcer(s)  - Right hip     Present on admission [ ]y [ ]n    LABS:                        12.5   9.16  )-----------( 149      ( 19 May 2022 07:45 )             39.4   05-19    135  |  99  |  15  ----------------------------<  99  4.6   |  25  |  0.65    Ca    9.5      19 May 2022 07:45  Phos  3.7     05-18  Mg     2.20     05-19    TPro  6.2  /  Alb  3.2<L>  /  TBili  0.7  /  DBili  x   /  AST  23  /  ALT  19  /  AlkPhos  68  05-18        RADIOLOGY & ADDITIONAL STUDIES:  CXRAY - No focal airspace opacity.    CT Head 5/18/2022- IMPRESSION: Subacute left MCA infarct.    TTE 5/14/2022  1. Mitral annular calcification and calcified mitral  leaflets with decreased diastolic opening. Minimal mitral  regurgitation. Mean transmitral valve gradient equals 2 mm  Hg, consistent with mild mitral stenosis.  2. Severely dilated left atrium.  LA volume index = 55  cc/m2.  3. Normal left ventricular internal dimensions and wall  thicknesses.  4. Endocardium not well visualized; grossly normal left  ventricular systolic function.  5. A device wire is noted in the right heart.  6. The right ventricle is not well visualized grossly  enlarged.  7. Normal tricuspid valve. Mild-moderate tricuspid  regurgitation.  8. Estimated pulmonary artery systolic pressure equals 53  mm Hg, assuming right atrial pressure equals 10  mm Hg,  consistent with moderate pulmonary hypertension.    CT Chest/ Abd/ Pelvis 5/13/2022  IMPRESSION:  Cardiomegaly. Small right pleural effusion.  Mild patchy and groundglass opacities, possibly edema or infection.  Large periampullary duodenal outpouching without adjacent fluid or   inflammation, favor large periampullary duodenal diverticulum.  Colonic diverticulosis without evidence of diverticulitis.    5/13/2022  Head CT: Mild right frontal scalp swelling. No acute intracranial   hemorrhage, vasogenic edema, extra-axial collection or calvarial   fracture. Large subacute left MCA territory infarct without significant   mass effect or midline shift.    Maxillofacial CT: No evidence of acute fracture or dislocation.  Mucosal thickening with foamy secretions in the bilateral sphenoid   sinuses. Fluid layering in the nasopharynx.    Cervical spine CT: No evidence of acute fracture or traumatic   malalignment. Mild degenerative changes.    Nonspecific groundglass and nodular opacities in the right upper lobe.   Consider follow-up chest CT for further evaluation.      PROTEIN CALORIE MALNUTRITION PRESENT: [ ]mild [ ]moderate [ ]severe [ ]underweight [ ]morbid obesity  https://www.andeal.org/vault/2440/web/files/ONC/Table_Clinical%20Characteristics%20to%20Document%20Malnutrition-White%20JV%20et%20al%202012.pdf    Height (cm): 165.1 (05-13-22 @ 14:31), 165.1 (11-30-21 @ 20:42)  Weight (kg): 52.8 (05-13-22 @ 18:40), 50 (11-30-21 @ 23:40)  BMI (kg/m2): 19.4 (05-13-22 @ 18:40), 18.3 (05-13-22 @ 14:31), 18.3 (11-30-21 @ 23:40)    [x ]PPSV2 < or = to 30% [ ]significant weight loss  [x ]poor nutritional intake  [ ]anasarca      [x ]Artificial Nutrition      REFERRALS:   [ ]Chaplaincy  [ ]Hospice  [ ]Child Life  [x ]Social Work  [x ]Case management [ ]Holistic Therapy     Goals of Care Document: SEE GO NOTE FROM TODAY (5/19)    ************************************************************************  PALLIATIVE MEDICINE COORDINATION OF CARE DOCUMENTATION  [x] Inpatient Consult  [ ] Other:  ************************************************************************    HPI reviewed     ------------------------------------------------------------------------    MEDICATION REVIEW:  --- Pls refer to current medicatons in the body of this note  ISTOP REFERENCE:  [id number]  Others' Prescriptions  Patient Name: Makayla AmaroBirth Date: 05/29/1930  Address: [address] [place] [id number]: Female  Rx Written	Rx Dispensed	Drug	Quantity	Days Supply	Prescriber Name  04/19/2022	04/20/2022	lorazepam 2 mg tablet	45	30	LaxBipin lambert MD  Prescriber [facility]ilaria # [id number]  Payment Method Insurance  [facility]  03/18/2022	03/23/2022	tramadol hcl 50 mg tablet	90	30	Goldberg, Avram Z MD  Prescriber [facility]ia # [id number]  Payment Method Insurance  [facility]  03/22/2022	03/23/2022	lorazepam 2 mg tablet	45	30	Bipin Luque MD  Prescriber [facility]ia # [id number]  Payment Method Insurance  [facility]  01/10/2022	03/05/2022	tramadol hcl 50 mg tablet	21	7	Goldberg, Avram Z MD  Prescriber [facility]ilaria # [id number]  Payment Method Cash  [facility]  01/10/2022	02/22/2022	tramadol hcl 50 mg tablet	21	7	Goldberg, Avram Z MD  Prescriber [facility]ilaria # [id number]  Payment Method Cash  [facility]  02/22/2022	02/22/2022	lorazepam 2 mg tablet	45	30	Bipin Luque MD  Prescriber [facility]ilaria # [id number]  Payment Method Insurance  [facility]  01/10/2022	02/10/2022	tramadol hcl 50 mg tablet	21	7	Goldberg, Avram Z MD  Prescriber [facility]ia # [id number]  Payment Method Insurance  [facility]  01/10/2022	01/31/2022	tramadol hcl 50 mg tablet	21	7	Goldberg, Avram Z MD  Prescriber [facility]ilaria # [id number]  Payment Method Insurance  [facility]  01/24/2022	01/25/2022	lorazepam 2 mg tablet	45	30	LaxerBipin MD  Prescriber [facility]ia # [id number]  Payment Method Insurance  [facility]  01/10/2022	01/17/2022	tramadol hcl 50 mg tablet	21	7	Goldberg, Avram Z MD  Prescriber [facility]ia # [id number]  Payment Method Insurance  [facility]  01/10/2022	01/10/2022	tramadol hcl 50 mg tablet	21	7	Goldberg, Avram Z MD  Prescriber [facility]ilaria # [id number]  Payment Method Insurance  [facility]  12/27/2021	12/27/2021	lorazepam 2 mg tablet	45	30	LaxerBipin MD  Prescriber [facility]ia # [id number]  Payment Method Insurance  [facility]  11/29/2021	11/29/2021	lorazepam 2 mg tablet	45	30	LaxerBipin MD  Prescriber [facility]ilaria # [id number]  Payment Method Insurance  [facility]  11/26/2021	11/26/2021	lorazepam 2 mg tablet	3	2	LaxerBipin MD  Prescriber [facility]ilaria # [id number]  Payment Method Insurance  [facility]  11/01/2021	11/02/2021	lorazepam 2 mg tablet	45	30	LaxerBipin MD  Prescriber [facility]ilaria # [id number]  Payment Method Insurance  [facility]  10/04/2021	10/05/2021	lorazepam 2 mg tablet	45	30	LaxerBipin MD  Prescriber [facility]ilaria # [id number]  Payment Method Insurance  [facility]  09/06/2021	09/08/2021	lorazepam 2 mg tablet	45	30	LaxerBipin MD  Prescriber [facility]ia # [id number]  Payment Method Insurance  [facility]  08/11/2021	08/11/2021	lorazepam 2 mg tablet	45	30	LaxerBipin MD  Prescriber [facility]ia # [id number]  Payment Method Insurance  [facility]  07/15/2021	07/16/2021	lorazepam 2 mg tablet	45	30	LaxerBipin MD  Prescriber [facility]ilaria # [id number]  Payment Method Insurance  [facility]  --- PRN usage: The patient HAS NOT used PRN's in the last 24h.  ------------------------------------------------------------------------  COORDINATION OF CARE:  --- Palliative Care consulted for: goals of care  --- Patient assessed:  5/19/2022  --- Patient previously seen by Palliative Care service: NO  ADVANCE CARE PLANNING  --- Code status: DNR/DNI  --- MOLST reviewed in chart: Yes  --- HCP/ Surrogate: Son  --- GOC document found in Alpha: NONE  --- HCP/ Living will/ Other advanced directives in Alpha: NONE  ------------------------------------------------------------------------  CARE PROVIDER DOCUMENTATION:  --- SW/CM notes reviewed  --- Medicine note reviewed   --- Cardio EP notes reviewed   --- Neurology notes reviewed   PLAN OF CARE  --- Known admissions in past year: 1  --- Current admit date: 5/13/2022  --- LOS: 7 days   --- LACE score: 10  --- Current dispo plan: TO BE DETERMINED  ------------------------------------------------------------------------  --- Chart reviewed: 30 Minutes [including time used to gather, review and transfer data to this note]    Prolonged services rendered, as part of this patient's care provided by Palliative Medicine, include: i.chart review for provider and ancillary service documentation, ii.pertinent diagnostics including laboratory and imaging studies,iii. medication review including PRN use, iv. admission history including previous palliative care encounters and GOC notes, v.advance care planning documents including HCP and MOLST forms in Alpha. Part of Palliative Medicine extended evaluation and management also involves coordination of care with our IDT, the primary and consulting hardy, and unit CM/SW and Hospice if eligible. Recommendations based on the information gathered and discussed are outline in the AP of our notes.    ************************************************************************

## 2022-05-19 NOTE — CONSULT NOTE ADULT - PROBLEM SELECTOR RECOMMENDATION 6
Palliative team introduced, extensive conversation with family today; family is considering comfort care but would like some time to make decision. palliative will continue to follow    Son/surrogate: states was named as HCP, will bring paperwork  Irineo Hampton [phone]   Pt also has a daughter but is estranged from family and patient. Requested HCP paperwork from Irineo    Patient is DNR/DNI.   Please see separate GOC note.  >16 minutes spent on advanced care planning with family.

## 2022-05-19 NOTE — CONSULT NOTE ADULT - ASSESSMENT
89yo female with Afib/xarelto, CAD/CABG, PPM, presented to ED after being found down by neighbors. REINIERN 5/11 PM. Patient intubated in ED for GCS of 30 initially. CT shows large subacute left MCA infarct; Outside of window for tPA. Deficits include left hemiparesis, global aphasia with gaurded prognosis. Pt extubated 5/16; NG tube placed 5/17; PT is DNI/DNR per son/surrogate MOLST completed.    Palliative team consulted for symptom management and to guide GOC/ACP.

## 2022-05-19 NOTE — OCCUPATIONAL THERAPY INITIAL EVALUATION ADULT - DIAGNOSIS, OT EVAL
s/p left MCA territory infarct; decreased functional mobility, decreased ADL performance, inability to follow commands

## 2022-05-19 NOTE — CONSULT NOTE ADULT - PROBLEM SELECTOR RECOMMENDATION 7
Thank you for allowing us to participate in your patient's care. Please page [phone] for any questions/concerns.

## 2022-05-19 NOTE — OCCUPATIONAL THERAPY INITIAL EVALUATION ADULT - ADDITIONAL COMMENTS
Patient's son at bedside provided social history as patient currently unable to follow commands/unable to communicate. Patient was independent with all ADLs and functional mobility. Lived in an apartment alone.

## 2022-05-19 NOTE — CONSULT NOTE ADULT - PROBLEM SELECTOR RECOMMENDATION 9
CT brain: Large area of low-attenuation left temporal frontal   parietal cortical and subcortical region with involvement of the left   basal ganglia and corona radiata region. This is compatible with a   subacute left MCA infarct. There is some areas of high attenuation   associated with this finding which could be compatible with spread   parenchymal versus underlying petechial hemorrhage. MRI can be done to   better characterize finding if clinically indicated and no   contraindications. Localized mass effect is seen consisting sulcal   effacement. Mass effect on left lateral ventricle seen. No significant   shift or herniation seen.    - Medical management per primary and neurology team  - palliative following for sx care, team introduced, GOC conversations started, patient is considering comfort care but not ready to make decision today - see full GOC note Patient requires total support for all ADL's.   Please assist with ADLs  Skin care as per protocol

## 2022-05-19 NOTE — CONSULT NOTE ADULT - ATTENDING COMMENTS
Large left MCA stroke - H/O atrial fibrillation - cardioembolic vs. large artery atherosclerosis.   Very poor prognosis -  she is very likely to have severe aphasia and severe right hemiparesis.    Family wishes her to be DNR now but will decide on more detailed goals of care in the next several days.   I explained in detail her prognosis to her son and grandson.   Thank you    There is a high probability of sudden, clinically significant, or life threatening deterioration in the patient’s condition which requires the highest level of physician preparedness to intervene urgently.  Critical care time:  45 minutes.
Patient was seen and evaluated with Dr. Toscano, Geriatric Medicine Fellow, during bedside rounds.   Agree with above findings and recommendations, edited documentation as appropriate to reflect the plan of care discussed with patient and myself with the following additions:    89F with HTN, HLD, Afib on Xarelto, CAD s/p CABG presenting to the ED 5/13 s/p being found unresponsive at home intubated in ED, CTH revealing large subacute left MCA territory infarct s/p MICU stay for vent and pressors course c/b afib w/ RVR.  Palliative Care consulted for complex decision making  related to goals of care discussions    Patient seen and examined at bedside. Patient lethargic; unable to follow commands. Patient with mild tachypnea during encounter.     GOC discussion with family. Family verbalized understanding of patient's hospitalization course and current clinical status. Patient is DNR/DNI. Family wish to continue with current medical management including NG tube.   Emotional support provided, questions answered.  >16 minutes spent on advanced care planning     Thank you for allowing us to participate in your patient's care. Please page [phone] for any questions/concerns.

## 2022-05-19 NOTE — PROGRESS NOTE ADULT - PROBLEM SELECTOR PLAN 1
History of atrial fib, cardioembolic vs. large artery atherosclerosis. Per neuro likely to have severe aphasia and severe right hemiparesis.  - appreciate neuro recs   - per neuro no indication for permissive HTN  - neuro repeat CTH reviewed, rec hold ac for additional 7 days from 5/18, after repeat imaging is stable and if within goals of care of the family's wishes.  - neuro checks   - tele  - asa and statin via NGT  - PT/OT  - falled TOV on 5/17-->5/18, shy rodas

## 2022-05-19 NOTE — CONSULT NOTE ADULT - PROBLEM SELECTOR RECOMMENDATION 2
- source of stroke likely embolic per speciality teams - due to MCA infarct  - please coordinate care with patient's family  -Frequent reassurance and verbal orientation   - Monitor for constipation, urinary retention, pain, etc.

## 2022-05-19 NOTE — CHART NOTE - NSCHARTNOTEFT_GEN_A_CORE
[facility] MEDICINE NIGHT COVERAGE    Patient seen at bedside. NGT measured at 53cm. NGT lubricated and placed into right nare without difficulty. Patient tolerated procedure well. NGT flushed with air with auscultation over stomach to verify placement. CXR STAT ordered to confirm placement.    T(C): 36.9 (05-18-22 @ 20:44), Max: 37 (05-18-22 @ 11:28)  HR: 93 (05-18-22 @ 20:44) (93 - 99)  BP: 139/76 (05-18-22 @ 20:44) (134/94 - 144/91)  RR: 18 (05-18-22 @ 20:44) (18 - 21)  SpO2: 97% (05-18-22 @ 20:44) (97% - 100%)

## 2022-05-19 NOTE — CONSULT NOTE ADULT - PROBLEM SELECTOR RECOMMENDATION 3
-on ativan at home, continue with 2mg at night  - will continue to monitor symptoms, discussions ongoing with family to guide GOC Patient currently with NG tube   Aspiration Precautions

## 2022-05-20 DIAGNOSIS — R45.1 RESTLESSNESS AND AGITATION: ICD-10-CM

## 2022-05-20 DIAGNOSIS — R06.00 DYSPNEA, UNSPECIFIED: ICD-10-CM

## 2022-05-20 LAB
ANION GAP SERPL CALC-SCNC: 14 MMOL/L — SIGNIFICANT CHANGE UP (ref 7–14)
BUN SERPL-MCNC: 17 MG/DL — SIGNIFICANT CHANGE UP (ref 7–23)
CALCIUM SERPL-MCNC: 9.4 MG/DL — SIGNIFICANT CHANGE UP (ref 8.4–10.5)
CHLORIDE SERPL-SCNC: 98 MMOL/L — SIGNIFICANT CHANGE UP (ref 98–107)
CO2 SERPL-SCNC: 20 MMOL/L — LOW (ref 22–31)
CREAT SERPL-MCNC: 0.66 MG/DL — SIGNIFICANT CHANGE UP (ref 0.5–1.3)
EGFR: 83 ML/MIN/1.73M2 — SIGNIFICANT CHANGE UP
GLUCOSE BLDC GLUCOMTR-MCNC: 105 MG/DL — HIGH (ref 70–99)
GLUCOSE BLDC GLUCOMTR-MCNC: 88 MG/DL — SIGNIFICANT CHANGE UP (ref 70–99)
GLUCOSE SERPL-MCNC: 92 MG/DL — SIGNIFICANT CHANGE UP (ref 70–99)
HCT VFR BLD CALC: 39.8 % — SIGNIFICANT CHANGE UP (ref 34.5–45)
HGB BLD-MCNC: 13.1 G/DL — SIGNIFICANT CHANGE UP (ref 11.5–15.5)
MAGNESIUM SERPL-MCNC: 2.2 MG/DL — SIGNIFICANT CHANGE UP (ref 1.6–2.6)
MCHC RBC-ENTMCNC: 30.4 PG — SIGNIFICANT CHANGE UP (ref 27–34)
MCHC RBC-ENTMCNC: 32.9 GM/DL — SIGNIFICANT CHANGE UP (ref 32–36)
MCV RBC AUTO: 92.3 FL — SIGNIFICANT CHANGE UP (ref 80–100)
NRBC # BLD: 0 /100 WBCS — SIGNIFICANT CHANGE UP
NRBC # FLD: 0 K/UL — SIGNIFICANT CHANGE UP
PHOSPHATE SERPL-MCNC: 3.4 MG/DL — SIGNIFICANT CHANGE UP (ref 2.5–4.5)
PLATELET # BLD AUTO: 167 K/UL — SIGNIFICANT CHANGE UP (ref 150–400)
POTASSIUM SERPL-MCNC: 4.4 MMOL/L — SIGNIFICANT CHANGE UP (ref 3.5–5.3)
POTASSIUM SERPL-SCNC: 4.4 MMOL/L — SIGNIFICANT CHANGE UP (ref 3.5–5.3)
RBC # BLD: 4.31 M/UL — SIGNIFICANT CHANGE UP (ref 3.8–5.2)
RBC # FLD: 16.4 % — HIGH (ref 10.3–14.5)
SARS-COV-2 RNA SPEC QL NAA+PROBE: SIGNIFICANT CHANGE UP
SODIUM SERPL-SCNC: 132 MMOL/L — LOW (ref 135–145)
WBC # BLD: 8.04 K/UL — SIGNIFICANT CHANGE UP (ref 3.8–10.5)
WBC # FLD AUTO: 8.04 K/UL — SIGNIFICANT CHANGE UP (ref 3.8–10.5)

## 2022-05-20 PROCEDURE — 93010 ELECTROCARDIOGRAM REPORT: CPT

## 2022-05-20 PROCEDURE — 99497 ADVNCD CARE PLAN 30 MIN: CPT | Mod: 25

## 2022-05-20 PROCEDURE — 99233 SBSQ HOSP IP/OBS HIGH 50: CPT

## 2022-05-20 RX ORDER — METOPROLOL TARTRATE 50 MG
5 TABLET ORAL EVERY 6 HOURS
Refills: 0 | Status: DISCONTINUED | OUTPATIENT
Start: 2022-05-20 | End: 2022-05-25

## 2022-05-20 RX ORDER — HYDROMORPHONE HYDROCHLORIDE 2 MG/ML
0.4 INJECTION INTRAMUSCULAR; INTRAVENOUS; SUBCUTANEOUS
Refills: 0 | Status: DISCONTINUED | OUTPATIENT
Start: 2022-05-20 | End: 2022-05-20

## 2022-05-20 RX ORDER — HYDROMORPHONE HYDROCHLORIDE 2 MG/ML
0.2 INJECTION INTRAMUSCULAR; INTRAVENOUS; SUBCUTANEOUS
Refills: 0 | Status: DISCONTINUED | OUTPATIENT
Start: 2022-05-20 | End: 2022-05-25

## 2022-05-20 RX ORDER — ROBINUL 0.2 MG/ML
0.4 INJECTION INTRAMUSCULAR; INTRAVENOUS EVERY 8 HOURS
Refills: 0 | Status: DISCONTINUED | OUTPATIENT
Start: 2022-05-20 | End: 2022-05-20

## 2022-05-20 RX ORDER — ROBINUL 0.2 MG/ML
0.4 INJECTION INTRAMUSCULAR; INTRAVENOUS EVERY 4 HOURS
Refills: 0 | Status: DISCONTINUED | OUTPATIENT
Start: 2022-05-20 | End: 2022-05-25

## 2022-05-20 RX ORDER — ACETAMINOPHEN 500 MG
650 TABLET ORAL ONCE
Refills: 0 | Status: DISCONTINUED | OUTPATIENT
Start: 2022-05-20 | End: 2022-05-20

## 2022-05-20 RX ORDER — METOPROLOL TARTRATE 50 MG
25 TABLET ORAL EVERY 8 HOURS
Refills: 0 | Status: DISCONTINUED | OUTPATIENT
Start: 2022-05-20 | End: 2022-05-20

## 2022-05-20 RX ORDER — ACETAMINOPHEN 500 MG
650 TABLET ORAL ONCE
Refills: 0 | Status: COMPLETED | OUTPATIENT
Start: 2022-05-20 | End: 2022-05-20

## 2022-05-20 RX ADMIN — Medication 650 MILLIGRAM(S): at 13:30

## 2022-05-20 RX ADMIN — POLYETHYLENE GLYCOL 3350 17 GRAM(S): 17 POWDER, FOR SOLUTION ORAL at 13:49

## 2022-05-20 RX ADMIN — HEPARIN SODIUM 5000 UNIT(S): 5000 INJECTION INTRAVENOUS; SUBCUTANEOUS at 22:55

## 2022-05-20 RX ADMIN — Medication 81 MILLIGRAM(S): at 13:47

## 2022-05-20 RX ADMIN — AMIODARONE HYDROCHLORIDE 200 MILLIGRAM(S): 400 TABLET ORAL at 06:48

## 2022-05-20 RX ADMIN — Medication 25 MILLIGRAM(S): at 06:48

## 2022-05-20 RX ADMIN — Medication 25 MILLIGRAM(S): at 13:50

## 2022-05-20 RX ADMIN — HEPARIN SODIUM 5000 UNIT(S): 5000 INJECTION INTRAVENOUS; SUBCUTANEOUS at 13:49

## 2022-05-20 NOTE — PROGRESS NOTE ADULT - ASSESSMENT
89F with HTN, HLD, Afib on Xarelto, CAD s/p CABG presenting to the ED 5/13 s/p being found unresponsive at home intubated in ED, CTH revealing large subacute left MCA territory infarct s/p MICU stay for vent and pressors course c/b afib w/ RVR now downgraded to floors.  Family now amenable to dc of NGT and focus on comfort per prior wishes.

## 2022-05-20 NOTE — GOALS OF CARE CONVERSATION - ADVANCED CARE PLANNING - CONVERSATION/DISCUSSION
Diagnosis/Prognosis/MOLST Discussed/Treatment Options
Diagnosis/Prognosis/MOLST Discussed/Treatment Options/Palliative Care Referral

## 2022-05-20 NOTE — GOALS OF CARE CONVERSATION - ADVANCED CARE PLANNING - CONVERSATION DETAILS
Follow-up conversation today with Patient's son, Irineo Hampton  ([phone]). Irineo obtained copy of patient's living will and HCP which he states he is named as HCP. He will bring in paperwork this afternoon. States he has reviewed it and it is clear that his "mom did not want any of this" and he is concerned that she is suffering. He states when he sees her, "she makes a zero sign" with her hand and he believes it to mean she is trying to tell us that she does not want this anymore. Irineo is struggling with burden of feeling as though he is saying she can't live anymore; much counseling and emotional support provided. Explained that she had made her decisions known in her living will and documentation completed prior and that he is respecting her wishes. Re-introduced comfort and symptoms approached care and he states he would like to focus on her comfort, remove the NG Tube, and minimize her suffering in her coming days. Irineo states he knows "mom is gone, she is not with us anymore" and it is so shocking because it was so sudden. Irineo is grateful for our call, discussion of treatment plan and he is agreeable move forward with comfort focused care. Follow-up conversation today with Patient's son, Irineo Hampton  ([phone]). Irineo obtained copy of patient's living will and HCP which he states he is named as HCP. He will bring in paperwork this afternoon. States he has reviewed it and it is clear that his "mom did not want any of this" and he is concerned that she is suffering. He states when he sees her, "she makes a zero sign" with her hand and he believes it to mean she is trying to tell us that she does not want this anymore. Irineo is struggling with burden of feeling as though he is saying she can't live anymore; much counseling and emotional support provided. Explained that she had made her decisions known in her living will and documentation completed prior and that he is respecting her wishes.   Re-introduced comfort and symptoms approached care and he states he would like to focus on her comfort, remove the NG Tube, and minimize her suffering in her coming days. Irineo states he knows "mom is gone, she is not with us anymore" and it is so shocking because it was so sudden. Irineo is grateful for our call, discussion of treatment plan and he is agreeable move forward with comfort focused care.

## 2022-05-20 NOTE — PROGRESS NOTE ADULT - PROBLEM SELECTOR PLAN 5
SQH  MOLST: DNR/DNI  family opting to remove NGT in line with prior wishes, started on sx management w/ dilaudid/ativan/robinol prn  will need ongoing discussion re: dispo, IP vs OP hospice

## 2022-05-20 NOTE — PROGRESS NOTE ADULT - SUBJECTIVE AND OBJECTIVE BOX
Patient is a 89y old  Female who presents with a chief complaint of Found Down    SUBJECTIVE / OVERNIGHT EVENTS:    eyes opening, grabbing at things, unable to report ROS  overnight self dc NGT and replaced     MEDICATIONS  (STANDING):  heparin   Injectable 5000 Unit(s) SubCutaneous every 8 hours  metoprolol tartrate Injectable 5 milliGRAM(s) IV Push every 6 hours    MEDICATIONS  (PRN):  glycopyrrolate Injectable 0.4 milliGRAM(s) IV Push every 4 hours PRN secretions  HYDROmorphone  Injectable 0.4 milliGRAM(s) IV Push every 2 hours PRN increase WOB, air hunger  LORazepam   Injectable 0.5 milliGRAM(s) IV Push every 4 hours PRN Agitation or anxiety  ondansetron Injectable 4 milliGRAM(s) IV Push every 8 hours PRN Nausea and/or Vomiting    T(C): 37.1 (05-20-22 @ 06:44), Max: 37.1 (05-20-22 @ 06:44)  HR: 97 (05-20-22 @ 11:42) (97 - 100)  BP: 150/89 (05-20-22 @ 11:42) (131/90 - 150/89)  RR: 17 (05-20-22 @ 11:42) (16 - 18)  SpO2: 100% (05-20-22 @ 11:42) (97% - 100%)    CAPILLARY BLOOD GLUCOSE  POCT Blood Glucose.: 105 mg/dL (20 May 2022 11:37)  POCT Blood Glucose.: 88 mg/dL (20 May 2022 01:59)    I&O's Summary    19 May 2022 07:01  -  20 May 2022 07:00  --------------------------------------------------------  IN: 40 mL / OUT: 800 mL / NET: -760 mL    PHYSICAL EXAM:  GENERAL: NAD, +NGT  EYES: L gaze deviation   CHEST/LUNG: Clear to auscultation bilaterally; No wheeze  HEART: irregular; No murmurs, rubs, or gallops  ABDOMEN: Soft, Nontender, Nondistended; Bowel sounds present  EXTREMITIES:   warm and well perfused, No clubbing, cyanosis, or edema  PSYCH: AAOx0, more restless  NEUROLOGY: R arm seems hemiparetic, did withdraw twitch to pain in b/l LE, squeezed L hand, aphasic   + begum    LABS:                        13.1   8.04  )-----------( 167      ( 20 May 2022 02:00 )             39.8     05-20    132<L>  |  98  |  17  ----------------------------<  92  4.4   |  20<L>  |  0.66    Ca    9.4      20 May 2022 02:00  Phos  3.4     05-20  Mg     2.20     05-20      Microbiology: Culture Results:   No Growth Final (05-14 @ 01:50)  Culture Results:   No Growth Final (05-14 @ 00:15)  Culture Results:   No growth (05-13 @ 19:04)    Consultant(s) Notes Reviewed:    Care Discussed with Consultants/Other Providers: palliative Dr. Tapia

## 2022-05-20 NOTE — PROGRESS NOTE ADULT - PROBLEM SELECTOR PLAN 8
WDL Emotional support provided, questions answered.    Discussed with primary team regarding goals of care discussion and symptom management    Thank you for allowing us to participate in your patient's care. Please page [phone] for any questions/concerns.

## 2022-05-20 NOTE — PROVIDER CONTACT NOTE (OTHER) - RECOMMENDATIONS
NGT Reinsertion  Cxray to confirm placement  Hold medications and fluids until placement is confirmed.
STAT CBC  Hold Anticoagulants  Continue to monitor urine output
NGT Reinsertion  Cxray to confirm placement  Hold medications and fluids until placement is confirmed.

## 2022-05-20 NOTE — GOALS OF CARE CONVERSATION - ADVANCED CARE PLANNING - TREATMENT GUIDELINE COMMENT
Comfort focused care  remove NG tube  no blood draws  remove soft restraints after NG tube removal
continue current management including NG tube  discussed sx management for tachypnea, secretions, pain and anxiety - family will consider once decision re comfort care is made

## 2022-05-20 NOTE — PROVIDER CONTACT NOTE (OTHER) - ACTION/TREATMENT ORDERED:
STAT CBC  Hold Anticoagulants  Continue to monitor urine output
NGT Reinsertion  Cxray to confirm placement  Hold medications and fluids until placement is confirmed.
NGT Reinsertion  Cxray to confirm placement  Hold medications and fluids until placement is confirmed.

## 2022-05-20 NOTE — GOALS OF CARE CONVERSATION - ADVANCED CARE PLANNING - TREATMENT GUIDELINES
DNR Order/Comfort measures only/Do not re-hospitalize/No blood draws/No artificial nutrition DNR Order/Comfort measures only/No blood draws

## 2022-05-20 NOTE — PROVIDER CONTACT NOTE (OTHER) - BACKGROUND
89y/o F pt h/o HTN, HLD, Afib, found unresponsive at home. Pt admitted for AMS CT Showed Subacute Left MCA Territory infarct. NGT inserted for Feedings
89y/o F pt h/o HTN, HLD, Afib, found unresponsive at home. Pt admitted for AMS CT Showed Subacute Left MCA Territory infarct. Indwelling catheter inserted 5/19 for urinary retention
89y/o F pt h/o HTN, HLD, Afib, found unresponsive at home. Pt admitted for AMS CT Showed Subacute Left MCA Territory infarct. NGT inserted for Feedings

## 2022-05-20 NOTE — PROGRESS NOTE ADULT - PROBLEM SELECTOR PLAN 3
- due to MCA infarct  - please coordinate care with patient's family  -Frequent reassurance and verbal orientation   - Monitor for constipation, urinary retention, pain, etc.

## 2022-05-20 NOTE — PROGRESS NOTE ADULT - PROBLEM SELECTOR PLAN 1
History of atrial fib, cardioembolic vs. large artery atherosclerosis. Per neuro likely to have severe aphasia and severe right hemiparesis.  - appreciate neuro recs, no indication for permissive HTN, repeat CTH reviewed, rec hold ac for additional 7 days from 5/18, after repeat imaging is stable and if within goals of care of the family's wishes  - dc neuro checks and tele  - no NGT, cannot c/w asa/statin given focus on comfort  - falled TOV on 5/17-->5/18, begum replaced  - focusing on comfort, no labs, symptom management prn via IV

## 2022-05-20 NOTE — PROGRESS NOTE ADULT - ASSESSMENT
89F with HTN, HLD, Afib on Xarelto, CAD s/p CABG presenting to the ED 5/13 s/p being found unresponsive at home intubated in ED, CTH revealing large subacute left MCA territory infarct s/p MICU stay for vent and pressors course c/b afib w/ RVR.  Palliative Care consulted for complex decision making  related to goals of care discussions

## 2022-05-20 NOTE — PROGRESS NOTE ADULT - PROBLEM SELECTOR PLAN 1
- Start IV Dilaudid 0.2mg q1 PRN pain/ dyspnea; if symptoms are uncontrolled with current dose can increase to IV Dilaudid 0.5mg PRN  Bowel regimen while on opiates: Dulcolax Supp PRN  - Start IV Robinul 0.4mg q4 PRN secretions

## 2022-05-20 NOTE — PROVIDER CONTACT NOTE (OTHER) - ASSESSMENT
Patient Alert and nonverbal, on assessment pt was found lying in bed with NGT, no bleeding or trauma noted to nares, no coughing or nonverbal indicators of SOB noted.
Patient Alert and nonverbal, on assessment pt was found lying in bed with NGT, no bleeding or trauma noted to nares, no coughing or nonverbal indicators of SOB noted.
Patient Alert and nonverbal, on assessment bright red blood was noted at insertion point of indwelling urethral catheter, nonverbal indicators of pain absent, no surrounding bleeding or erythema noted

## 2022-05-20 NOTE — PROGRESS NOTE ADULT - SUBJECTIVE AND OBJECTIVE BOX
SUBJECTIVE AND OBJECTIVE:  Patient seen and examined at bedside. Patient lethargic, unable to follow commands.     INTERVAL HPI/OVERNIGHT EVENTS:  Overnight, patient self-removed NG tube, NG was replaced     Allergies    sulfa drugs (Unknown)    Intolerances    MEDICATIONS  (STANDING):  heparin   Injectable 5000 Unit(s) SubCutaneous every 8 hours  metoprolol tartrate Injectable 5 milliGRAM(s) IV Push every 6 hours    MEDICATIONS  (PRN):  glycopyrrolate Injectable 0.4 milliGRAM(s) IV Push every 8 hours PRN secretions  HYDROmorphone  Injectable 0.4 milliGRAM(s) IV Push every 2 hours PRN increase WOB, air hunger  LORazepam   Injectable 0.5 milliGRAM(s) IV Push every 4 hours PRN Agitation or anxiety  ondansetron Injectable 4 milliGRAM(s) IV Push every 8 hours PRN Nausea and/or Vomiting      ITEMS UNCHECKED ARE NOT PRESENT    PRESENT SYMPTOMS: [x ]Unable to self-report due to altered mental status- see [ ] CPOT [ x] PAINADS [x ] RDOS  Source if other than patient:  [ ]Family   [ ]Team     Pain:  [ ]yes [ ]no  QOL impact -   Location -                    Aggravating factors -  Quality -  Radiation -  Timing-  Severity (0-10 scale):  Minimal acceptable level (0-10 scale):     Dyspnea:                           [ ]Mild [ ]Moderate [ ]Severe  RDOS: 0  0 to 2  minimal or no respiratory distress   3  mild distress  4 to 6 moderate distress  >7 severe distress  [email or web address]_Distress_Observation_Scale.pdf (Ctrl +  left click to view)     Anxiety:                             [ ]Mild [ ]Moderate [ ]Severe  Agitation:                          [ ]Mild [ ]Moderate [ ]Severe  Fatigue:                             [ ]Mild [ ]Moderate [ ]Severe  Nausea:                             [ ]Mild [ ]Moderate [ ]Severe  Loss of appetite:              [ ]Mild [ ]Moderate [ ]Severe  Constipation:                   [ ]Mild [ ]Moderate [ ]Severe  Diarrhea:                          [ ]Mild [ ]Moderate [ ]Severe      CPOT:    https://www.[facility].org/getattachment/[email or web address]/Critical-Care-Pain-Observation-Tool-(CPOT)    PAIN AD Score:	0  http://geriatrictoolkit.[employer].edu/cog/painad.pdf (Ctrl + left click to view)    Other Symptoms:  [ ]All other review of systems negative -unable to obtain due to encephalopathy     Palliative Performance Status Version 2:      30   %      [email or web address]://[email or web address]_performance_scale_[email or web address].pdf    PHYSICAL EXAM:  Vital Signs Last 24 Hrs  T(C): 37.1 (20 May 2022 06:44), Max: 37.1 (20 May 2022 06:44)  T(F): 98.8 (20 May 2022 06:44), Max: 98.8 (20 May 2022 06:44)  HR: 97 (20 May 2022 11:42) (97 - 100)  BP: 150/89 (20 May 2022 11:42) (131/90 - 150/89)  BP(mean): --  RR: 17 (20 May 2022 11:42) (16 - 18)  SpO2: 100% (20 May 2022 11:42) (97% - 100%)     I&O's Summary    19 May 2022 07:01  -  20 May 2022 07:00  --------------------------------------------------------  IN: 40 mL / OUT: 800 mL / NET: -760 mL         GENERAL:  [ ]Alert  [ ]Oriented x   [ ]Lethargic  [ ]Cachexia  [ ]Unarousable  [ ]Verbal  [ ]Non-Verbal  [ ] No Distress  Behavioral:   [ ] Anxiety  [ ] Delirium [ ] Agitation [ ] Calm  [ ] Other  HEENT:  [ ]Normal  [ ] Temporal Wasting  [ ]Dry mouth   [ ]ET Tube/Trach  [ ]Oral lesions  [ ] Mucositis  PULMONARY:   [ ]Clear [ ]Tachypnea  [ ]Audible excessive secretions   [ ]Rhonchi        [ ]Right [ ]Left [ ]Bilateral  [ ]Crackles        [ ]Right [ ]Left [ ]Bilateral  [ ]Wheezing     [ ]Right [ ]Left [ ]Bilateral  [ ]Diminished breath sounds [ ]right [ ]left [ ]bilateral  CARDIOVASCULAR:    [ ]Regular [ ]Irregular [ ]Tachy  [ ]Clyde [ ]Murmur [ ]Other  GASTROINTESTINAL:  [ ]Soft  [ ]Distended   [ ]+BS  [ ]Non tender [ ]Tender  [ ]PEG [ ]OGT/ NGT  Last BM:   GENITOURINARY:  [ ]Normal [ ] Incontinent   [ ]Oliguria/Anuria   [ ]Jin  MUSCULOSKELETAL:   [ ]Normal   [ ]Weakness  [ ]Bed/Wheelchair bound [ ]Edema  [  ] amputation  [  ] contraction  NEUROLOGIC:   [ ]No focal deficits  [ ]Cognitive impairment  [ ]Dysphagia [ ]Dysarthria [ ]Paresis [ ]Other   SKIN: See Nursing Skin Assessment for further details  [ ]Normal    [ ]Rash  [ ]Pressure ulcer(s)       Present on admission [ ]y [ ]n   [  ]  Wound    [  ] hyperpigmentation    CRITICAL CARE:  [ ]Shock Present  [ ]Septic [ ]Cardiogenic [ ]Neurologic [ ]Hypovolemic  [ ]Vasopressors [ ]Inotropes  [ ]Respiratory failure present [ ]Mechanical Ventilation [ ]Non-invasive ventilatory support [ ]High-Flow   [ ]Acute  [ ]Chronic [ ]Hypoxic  [ ]Hypercarbic [ ]Other  [ ]Other organ failure     LABS:                        13.1   8.04  )-----------( 167      ( 20 May 2022 02:00 )             39.8   05-20    132<L>  |  98  |  17  ----------------------------<  92  4.4   |  20<L>  |  0.66    Ca    9.4      20 May 2022 02:00  Phos  3.4     05-20  Mg     2.20     05-20        CAPILLARY BLOOD GLUCOSE      POCT Blood Glucose.: 105 mg/dL (20 May 2022 11:37)  POCT Blood Glucose.: 88 mg/dL (20 May 2022 01:59)          RADIOLOGY & ADDITIONAL STUDIES:    Protein Calorie Malnutrition Present: [ ]mild [ ]moderate [ ]severe [ ]underweight [ ]morbid obesity  https://www.andeal.org/vault/2440/web/files/ONC/Table_Clinical%20Characteristics%20to%20Document%20Malnutrition-White%20JV%20et%20al%202012.pdf    Height (cm): 165.1 (05-13-22 @ 14:31), 165.1 (11-30-21 @ 20:42)  Weight (kg): 52.8 (05-13-22 @ 18:40), 50 (11-30-21 @ 23:40)  BMI (kg/m2): 19.4 (05-13-22 @ 18:40), 18.3 (05-13-22 @ 14:31), 18.3 (11-30-21 @ 23:40)    [ ]PPSV2 < or = 30%  [ ]significant weight loss [ ]poor nutritional intake [ ]anasarca   [ ]Artificial Nutrition    REFERRALS:   [ ]Chaplaincy  [ ]Hospice  [ ]Child Life  [ ]Social Work  [ ]Case management [ ]Holistic Therapy        SUBJECTIVE AND OBJECTIVE:  Patient seen and examined at bedside. Patient lethargic, unable to follow commands.     INTERVAL HPI/OVERNIGHT EVENTS:  Overnight, patient self-removed NG tube, NG was replaced     Allergies    sulfa drugs (Unknown)    Intolerances    MEDICATIONS  (STANDING):  heparin   Injectable 5000 Unit(s) SubCutaneous every 8 hours  metoprolol tartrate Injectable 5 milliGRAM(s) IV Push every 6 hours    MEDICATIONS  (PRN):  glycopyrrolate Injectable 0.4 milliGRAM(s) IV Push every 8 hours PRN secretions  HYDROmorphone  Injectable 0.4 milliGRAM(s) IV Push every 2 hours PRN increase WOB, air hunger  LORazepam   Injectable 0.5 milliGRAM(s) IV Push every 4 hours PRN Agitation or anxiety  ondansetron Injectable 4 milliGRAM(s) IV Push every 8 hours PRN Nausea and/or Vomiting      ITEMS UNCHECKED ARE NOT PRESENT    PRESENT SYMPTOMS: [x ]Unable to self-report due to altered mental status- see [ ] CPOT [ x] PAINADS [x ] RDOS  Source if other than patient:  [ ]Family   [ ]Team     Pain:  [ ]yes [ ]no  QOL impact -   Location -                    Aggravating factors -  Quality -  Radiation -  Timing-  Severity (0-10 scale):  Minimal acceptable level (0-10 scale):     Dyspnea:                           [ ]Mild [ ]Moderate [ ]Severe  RDOS: 0  0 to 2  minimal or no respiratory distress   3  mild distress  4 to 6 moderate distress  >7 severe distress  [email or web address]://[email or web address]_Distress_Observation_Scale.pdf (Ctrl +  left click to view)     Anxiety:                             [ ]Mild [ ]Moderate [ ]Severe  Agitation:                          [ ]Mild [ ]Moderate [ ]Severe  Fatigue:                             [ ]Mild [ ]Moderate [ ]Severe  Nausea:                             [ ]Mild [ ]Moderate [ ]Severe  Loss of appetite:              [ ]Mild [ ]Moderate [ ]Severe  Constipation:                   [ ]Mild [ ]Moderate [ ]Severe  Diarrhea:                          [ ]Mild [ ]Moderate [ ]Severe      CPOT:    https://www.[facility].org/getattachment/[email or web address]-[email or web address]/Critical-Care-Pain-Observation-Tool-(CPOT)    PAIN AD Score:	0  http://geriatrictoolkit.[employer].edu/cog/painad.pdf (Ctrl + left click to view)    Other Symptoms:  [ ]All other review of systems negative -unable to obtain due to encephalopathy     Palliative Performance Status Version 2:      30   %      http://[email or web address]/files/news/palliative_performance_scale_[email or web address].pdf    PHYSICAL EXAM:  Vital Signs Last 24 Hrs  T(C): 37.1 (20 May 2022 06:44), Max: 37.1 (20 May 2022 06:44)  T(F): 98.8 (20 May 2022 06:44), Max: 98.8 (20 May 2022 06:44)  HR: 97 (20 May 2022 11:42) (97 - 100)  BP: 150/89 (20 May 2022 11:42) (131/90 - 150/89)  BP(mean): --  RR: 17 (20 May 2022 11:42) (16 - 18)  SpO2: 100% (20 May 2022 11:42) (97% - 100%)     I&O's Summary    19 May 2022 07:01  -  20 May 2022 07:00  --------------------------------------------------------  IN: 40 mL / OUT: 800 mL / NET: -760 mL         GENERAL:  [ ]Alert  [ ]Oriented    [x ]Lethargic  [ ]Cachexia  [ ]Unarousable  [ ]Verbal  [ x]Non-Verbal  Behavioral:   [ ] Anxiety  [ ] Delirium [ ] Agitation [x ] Other- encephalopathy  HEENT: NG TUBE  [ ]Normal   [ x]Dry mouth   [ ]ET Tube/Trach  [ ]Oral lesions  PULMONARY:   [ ]Clear [ ]Tachypnea  [ ]Audible excessive secretions   [ ]Rhonchi        [ ]Right [ ]Left [ ]Bilateral  [ ]Crackles        [ ]Right [ ]Left [ ]Bilateral  [ ]Wheezing     [ ]Right [ ]Left [ ]Bilateral  [ x]Diminished breath sounds [ ]right [ ]left [ x]bilateral  CARDIOVASCULAR:    [ x]Regular [ ]Irregular [ ]Tachy  [ ]Clyde [ ]Murmur [ ]Other  GASTROINTESTINAL:  [x ]Soft  [ ]Distended   [ ]+BS  [x ]Non tender [ ]Tender  [ ]PEG [x ] NGT Last Bowel Movement: 5/18  GENITOURINARY:  [ ]Normal [ ] Incontinent   [ ]Oliguria/Anuria   [x ]Jin   MUSCULOSKELETAL:   [ ]Normal   [ ]Weakness  [x ]Bed/Wheelchair bound [ ]Edema  NEUROLOGIC:   [ ]No focal deficits  [ ]Cognitive impairment  [x ]Dysphagia [ ]Dysarthria [x ]Paresis- right hemiparesis [x ]Other - encephalopathy  SKIN:   [ ]Normal    [ ]Rash  [ x]Pressure ulcer(s)  - Right hip     Present on admission [ ]y [ ]n      CRITICAL CARE:  [ ]Shock Present  [ ]Septic [ ]Cardiogenic [ ]Neurologic [ ]Hypovolemic  [ ]Vasopressors [ ]Inotropes  [ ]Respiratory failure present [ ]Mechanical Ventilation [ ]Non-invasive ventilatory support [ ]High-Flow   [ ]Acute  [ ]Chronic [ ]Hypoxic  [ ]Hypercarbic [ ]Other  [ ]Other organ failure     LABS:                                        13.1   8.04  )-----------( 167      ( 20 May 2022 02:00 )             39.8       05-20    132<L>  |  98  |  17  ----------------------------<  92  4.4   |  20<L>  |  0.66    Ca    9.4      20 May 2022 02:00  Phos  3.4     05-20  Mg     2.20     05-20      RADIOLOGY & ADDITIONAL STUDIES: reviewed     Protein Calorie Malnutrition Present: [ ]mild [ ]moderate [ ]severe [ ]underweight [ ]morbid obesity  https://www.andeal.org/vault/5750/web/files/ONC/Table_Clinical%20Characteristics%20to%20Document%20Malnutrition-White%20JV%20et%20al%202012.pdf    Height (cm): 165.1 (05-13-22 @ 14:31), 165.1 (11-30-21 @ 20:42)  Weight (kg): 52.8 (05-13-22 @ 18:40), 50 (11-30-21 @ 23:40)  BMI (kg/m2): 19.4 (05-13-22 @ 18:40), 18.3 (05-13-22 @ 14:31), 18.3 (11-30-21 @ 23:40)    [x ]PPSV2 < or = 30%  [ ]significant weight loss [ ]poor nutritional intake [ ]anasarca   [ ]Artificial Nutrition    REFERRALS:   [ ]Chaplaincy  [ ]Hospice  [ ]Child Life  [ ]Social Work  [x ]Case management [ ]Holistic Therapy

## 2022-05-21 PROCEDURE — 99232 SBSQ HOSP IP/OBS MODERATE 35: CPT

## 2022-05-21 RX ADMIN — HEPARIN SODIUM 5000 UNIT(S): 5000 INJECTION INTRAVENOUS; SUBCUTANEOUS at 05:52

## 2022-05-21 RX ADMIN — Medication 5 MILLIGRAM(S): at 18:36

## 2022-05-21 RX ADMIN — Medication 5 MILLIGRAM(S): at 12:52

## 2022-05-21 RX ADMIN — HEPARIN SODIUM 5000 UNIT(S): 5000 INJECTION INTRAVENOUS; SUBCUTANEOUS at 22:16

## 2022-05-21 RX ADMIN — HEPARIN SODIUM 5000 UNIT(S): 5000 INJECTION INTRAVENOUS; SUBCUTANEOUS at 12:55

## 2022-05-21 RX ADMIN — Medication 5 MILLIGRAM(S): at 05:52

## 2022-05-21 NOTE — PROGRESS NOTE ADULT - SUBJECTIVE AND OBJECTIVE BOX
Patient is a 89y old  Female who presents with a chief complaint of Found Down    SUBJECTIVE / OVERNIGHT EVENTS:    awake, moving around on her L arm, grabbing things  not following commands, appears comfortable     MEDICATIONS  (STANDING):  heparin   Injectable 5000 Unit(s) SubCutaneous every 8 hours  metoprolol tartrate Injectable 5 milliGRAM(s) IV Push every 6 hours    MEDICATIONS  (PRN):  bisacodyl 5 milliGRAM(s) Oral at bedtime PRN Constipation  glycopyrrolate Injectable 0.4 milliGRAM(s) IV Push every 4 hours PRN secretions  HYDROmorphone  Injectable 0.2 milliGRAM(s) IV Push every 1 hour PRN increase WOB, air hunger  LORazepam   Injectable 0.5 milliGRAM(s) IV Push every 4 hours PRN Agitation or anxiety  ondansetron Injectable 4 milliGRAM(s) IV Push every 8 hours PRN Nausea and/or Vomiting    T(C): 37 (05-21-22 @ 06:00), Max: 37.1 (05-21-22 @ 05:48)  HR: 96 (05-21-22 @ 06:00) (61 - 102)  BP: 125/77 (05-21-22 @ 06:00) (99/53 - 133/86)  RR: 17 (05-21-22 @ 06:00) (17 - 17)  SpO2: 97% (05-21-22 @ 05:48) (97% - 100%)    PHYSICAL EXAM:  GENERAL: NAD   EYES: L gaze deviation   CHEST/LUNG: Clear to auscultation bilaterally; No wheeze  HEART: irregular; No murmurs, rubs, or gallops  ABDOMEN: Soft, Nontender, Nondistended; Bowel sounds present  EXTREMITIES:   warm and well perfused, No clubbing, cyanosis, or edema  PSYCH: AAOx0, restless  NEUROLOGY: R arm seems hemiparetic, did withdraw twitch to pain in b/l LE, squeezed L hand, aphasic   + begum    LABS:                        13.1   8.04  )-----------( 167      ( 20 May 2022 02:00 )             39.8     05-20    132<L>  |  98  |  17  ----------------------------<  92  4.4   |  20<L>  |  0.66    Ca    9.4      20 May 2022 02:00  Phos  3.4     05-20  Mg     2.20     05-20    Consultant(s) Notes Reviewed:    Care Discussed with Consultants/Other Providers:

## 2022-05-22 PROCEDURE — 99232 SBSQ HOSP IP/OBS MODERATE 35: CPT

## 2022-05-22 RX ADMIN — Medication 5 MILLIGRAM(S): at 00:44

## 2022-05-22 RX ADMIN — Medication 5 MILLIGRAM(S): at 11:26

## 2022-05-22 RX ADMIN — HEPARIN SODIUM 5000 UNIT(S): 5000 INJECTION INTRAVENOUS; SUBCUTANEOUS at 05:41

## 2022-05-22 RX ADMIN — Medication 5 MILLIGRAM(S): at 17:00

## 2022-05-22 RX ADMIN — HEPARIN SODIUM 5000 UNIT(S): 5000 INJECTION INTRAVENOUS; SUBCUTANEOUS at 22:01

## 2022-05-22 RX ADMIN — Medication 5 MILLIGRAM(S): at 05:45

## 2022-05-22 RX ADMIN — HEPARIN SODIUM 5000 UNIT(S): 5000 INJECTION INTRAVENOUS; SUBCUTANEOUS at 13:14

## 2022-05-22 NOTE — PROGRESS NOTE ADULT - SUBJECTIVE AND OBJECTIVE BOX
Patient is a 89y old  Female who presents with a chief complaint of Found Down    SUBJECTIVE / OVERNIGHT EVENTS:    unable to obtain ROS, eyes open, not tracking or following commands     MEDICATIONS  (STANDING):  heparin   Injectable 5000 Unit(s) SubCutaneous every 8 hours  metoprolol tartrate Injectable 5 milliGRAM(s) IV Push every 6 hours    MEDICATIONS  (PRN):  bisacodyl Suppository 10 milliGRAM(s) Rectal daily PRN Constipation  glycopyrrolate Injectable 0.4 milliGRAM(s) IV Push every 4 hours PRN secretions  HYDROmorphone  Injectable 0.2 milliGRAM(s) IV Push every 1 hour PRN increase WOB, air hunger  LORazepam   Injectable 0.5 milliGRAM(s) IV Push every 4 hours PRN Agitation or anxiety  ondansetron Injectable 4 milliGRAM(s) IV Push every 8 hours PRN Nausea and/or Vomiting    T(C): 37.1 (05-22-22 @ 10:42), Max: 37.1 (05-22-22 @ 10:42)  HR: 106 (05-22-22 @ 11:21) (82 - 110)  BP: 142/81 (05-22-22 @ 11:21) (124/60 - 148/120)  RR: 17 (05-22-22 @ 10:42) (17 - 17)  SpO2: 97% (05-22-22 @ 10:42) (95% - 98%)    PHYSICAL EXAM:  GENERAL: NAD   EYES: L gaze deviation   CHEST/LUNG: Clear to auscultation bilaterally; No wheeze  HEART: irregular; No murmurs, rubs, or gallops  ABDOMEN: Soft, Nontender, Nondistended; Bowel sounds present  EXTREMITIES:   warm and well perfused, No clubbing, cyanosis, or edema  PSYCH: AAOx0, restless  NEUROLOGY: R arm seems hemiparetic, did withdraw twitch to pain in b/l LE, squeezed L hand, aphasic   + begum    LABS: comfort     Consultant(s) Notes Reviewed:    Care Discussed with Consultants/Other Providers:

## 2022-05-22 NOTE — PROGRESS NOTE ADULT - PROBLEM SELECTOR PLAN 1
History of atrial fib, cardioembolic vs. large artery atherosclerosis. Per neuro likely to have severe aphasia and severe right hemiparesis.  - appreciate neuro recs, no indication for permissive HTN, repeat CTH reviewed, rec hold ac for additional 7 days from 5/18, after repeat imaging is stable and if within goals of care of the family's wishes  - dc neuro checks and tele  - no NGT, cannot c/w asa/statin given focus on comfort  - failed TOV on 5/17-->5/18, Jin replaced  - focusing on comfort, no labs, symptom management prn via IV

## 2022-05-23 PROCEDURE — 99232 SBSQ HOSP IP/OBS MODERATE 35: CPT

## 2022-05-23 PROCEDURE — 99497 ADVNCD CARE PLAN 30 MIN: CPT | Mod: 25

## 2022-05-23 PROCEDURE — 99233 SBSQ HOSP IP/OBS HIGH 50: CPT

## 2022-05-23 RX ADMIN — HEPARIN SODIUM 5000 UNIT(S): 5000 INJECTION INTRAVENOUS; SUBCUTANEOUS at 05:42

## 2022-05-23 RX ADMIN — Medication 5 MILLIGRAM(S): at 18:33

## 2022-05-23 RX ADMIN — Medication 5 MILLIGRAM(S): at 00:52

## 2022-05-23 RX ADMIN — HEPARIN SODIUM 5000 UNIT(S): 5000 INJECTION INTRAVENOUS; SUBCUTANEOUS at 23:26

## 2022-05-23 RX ADMIN — Medication 5 MILLIGRAM(S): at 05:42

## 2022-05-23 RX ADMIN — Medication 5 MILLIGRAM(S): at 12:57

## 2022-05-23 RX ADMIN — Medication 5 MILLIGRAM(S): at 23:27

## 2022-05-23 RX ADMIN — Medication 0.5 MILLIGRAM(S): at 18:33

## 2022-05-23 RX ADMIN — HEPARIN SODIUM 5000 UNIT(S): 5000 INJECTION INTRAVENOUS; SUBCUTANEOUS at 13:00

## 2022-05-23 NOTE — PROGRESS NOTE ADULT - TIME BILLING
time spent educating family about EOL in terms of what to expect and time spent coordinating care
Diagnosis and treatment plan; counselling for secondary stroke prevention  Agree with above; ROS otherwise negative

## 2022-05-23 NOTE — PROGRESS NOTE ADULT - SUBJECTIVE AND OBJECTIVE BOX
SUBJECTIVE AND OBJECTIVE:  Patient seen and examined at bedside. Patient lethargic, unable to follow commands.     INTERVAL HPI/OVERNIGHT EVENTS:  No PRNs of IV Dilaudid or Ativan used in past 24 hours.     Allergies    sulfa drugs (Unknown)    Intolerances    MEDICATIONS  (STANDING):  heparin   Injectable 5000 Unit(s) SubCutaneous every 8 hours  metoprolol tartrate Injectable 5 milliGRAM(s) IV Push every 6 hours    MEDICATIONS  (PRN):  bisacodyl Suppository 10 milliGRAM(s) Rectal daily PRN Constipation  glycopyrrolate Injectable 0.4 milliGRAM(s) IV Push every 4 hours PRN secretions  HYDROmorphone  Injectable 0.2 milliGRAM(s) IV Push every 1 hour PRN increase WOB, air hunger  LORazepam   Injectable 0.5 milliGRAM(s) IV Push every 4 hours PRN Agitation or anxiety  ondansetron Injectable 4 milliGRAM(s) IV Push every 8 hours PRN Nausea and/or Vomiting      ITEMS UNCHECKED ARE NOT PRESENT    PRESENT SYMPTOMS: [x ]Unable to self-report due to altered mental status- see [ ] CPOT [ x] PAINADS [x ] RDOS  Source if other than patient:  [ ]Family   [ ]Team     Pain:  [ ]yes [ ]no  QOL impact -   Location -                    Aggravating factors -  Quality -  Radiation -  Timing-  Severity (0-10 scale):  Minimal acceptable level (0-10 scale):     Dyspnea:                           [ ]Mild [ ]Moderate [ ]Severe  RDOS: 0  0 to 2  minimal or no respiratory distress   3  mild distress  4 to 6 moderate distress  >7 severe distress  [email or web address]://[email or web address]/hen/Respiratory_Distress_Observation_Scale.pdf (Ctrl +  left click to view)     Anxiety:                             [ ]Mild [ ]Moderate [ ]Severe  Agitation:                          [ ]Mild [ ]Moderate [ ]Severe  Fatigue:                             [ ]Mild [ ]Moderate [ ]Severe  Nausea:                             [ ]Mild [ ]Moderate [ ]Severe  Loss of appetite:              [ ]Mild [ ]Moderate [ ]Severe  Constipation:                   [ ]Mild [ ]Moderate [ ]Severe  Diarrhea:                          [ ]Mild [ ]Moderate [ ]Severe      CPOT:    https://www.[facility].org/getattachment/[email or web address]/Critical-Care-Pain-Observation-Tool-(CPOT)    PAIN AD Score:	0  http://geriatrictoolkit.[facility]/cog/painad.pdf (Ctrl + left click to view)    Other Symptoms:  [ ]All other review of systems negative -unable to obtain due to encephalopathy     Palliative Performance Status Version 2:      20   %      [email or web address]/[email or web address]_performance_scale_[email or web address].pdf    PHYSICAL EXAM:  Vital Signs Last 24 Hrs  T(C): 36.6 (23 May 2022 12:20), Max: 37.3 (22 May 2022 15:40)  T(F): 97.9 (23 May 2022 12:20), Max: 99.1 (22 May 2022 15:40)  HR: 109 (23 May 2022 12:20) (87 - 112)  BP: 150/82 (23 May 2022 12:20) (134/77 - 151/88)  BP(mean): --  RR: 18 (23 May 2022 12:20) (17 - 19)  SpO2: 100% (23 May 2022 12:20) (96% - 100%)     GENERAL:  [ ]Alert  [ ]Oriented    [x ]Lethargic  [ ]Cachexia  [ ]Unarousable  [ ]Verbal  [ x]Non-Verbal  Behavioral:   [ ] Anxiety  [ ] Delirium [ ] Agitation [x ] Other- encephalopathy  HEENT: NG TUBE  [ ]Normal   [ x]Dry mouth   [ ]ET Tube/Trach  [ ]Oral lesions  PULMONARY:   [ ]Clear [ ]Tachypnea  [ ]Audible excessive secretions   [ ]Rhonchi        [ ]Right [ ]Left [ ]Bilateral  [ ]Crackles        [ ]Right [ ]Left [ ]Bilateral  [ ]Wheezing     [ ]Right [ ]Left [ ]Bilateral  [ x]Diminished breath sounds [ ]right [ ]left [ x]bilateral  CARDIOVASCULAR:    [ x]Regular [ ]Irregular [ ]Tachy  [ ]Clyde [ ]Murmur [ ]Other  GASTROINTESTINAL:  [x ]Soft  [ ]Distended   [ ]+BS  [x ]Non tender [ ]Tender  [ ]PEG [x ] NGT Last Bowel Movement: 5/21  GENITOURINARY:  [ ]Normal [ ] Incontinent   [ ]Oliguria/Anuria   [x ]Jin   MUSCULOSKELETAL:   [ ]Normal   [ ]Weakness  [x ]Bed/Wheelchair bound [ ]Edema  NEUROLOGIC:   [ ]No focal deficits  [ ]Cognitive impairment  [x ]Dysphagia [ ]Dysarthria [x ]Paresis- right hemiparesis [x ]Other - encephalopathy  SKIN:   [ ]Normal    [ ]Rash  [ x]Pressure ulcer(s)  - Right hip     Present on admission [ ]y [ ]n      CRITICAL CARE:  [ ]Shock Present  [ ]Septic [ ]Cardiogenic [ ]Neurologic [ ]Hypovolemic  [ ]Vasopressors [ ]Inotropes  [ ]Respiratory failure present [ ]Mechanical Ventilation [ ]Non-invasive ventilatory support [ ]High-Flow   [ ]Acute  [ ]Chronic [ ]Hypoxic  [ ]Hypercarbic [ ]Other  [ ]Other organ failure     LABS: reviewed               RADIOLOGY & ADDITIONAL STUDIES: reviewed     Protein Calorie Malnutrition Present: [ ]mild [ ]moderate [ ]severe [ ]underweight [ ]morbid obesity  https://www.andeal.org/vault/2440/web/files/ONC/Table_Clinical%20Characteristics%20to%20Document%20Malnutrition-White%20JV%20et%20al%202012.pdf    Height (cm): 165.1 (05-13-22 @ 14:31), 165.1 (11-30-21 @ 20:42)  Weight (kg): 52.8 (05-13-22 @ 18:40), 50 (11-30-21 @ 23:40)  BMI (kg/m2): 19.4 (05-13-22 @ 18:40), 18.3 (05-13-22 @ 14:31), 18.3 (11-30-21 @ 23:40)    [x ]PPSV2 < or = 30%  [ ]significant weight loss [ ]poor nutritional intake [ ]anasarca   [ ]Artificial Nutrition    REFERRALS:   [ ]Chaplaincy  [ ]Hospice  [ ]Child Life  [ ]Social Work  [x ]Case management [ ]Holistic Therapy

## 2022-05-23 NOTE — PROGRESS NOTE ADULT - SUBJECTIVE AND OBJECTIVE BOX
ROYCEDAJUAN  89y  Female      Patient is a 89y old  Female who presents with a chief complaint of Found Down (23 May 2022 14:56)      INTERVAL HPI/OVERNIGHT EVENTS: No acute events overnight. Unable to obtain ROS 2/2 advanced dementia         REVIEW OF SYSTEMS:  As per hpi    MEDICATIONS (STANDING):   bisacodyl Suppository 10 milliGRAM(s) Rectal daily PRN  glycopyrrolate Injectable 0.4 milliGRAM(s) IV Push every 4 hours PRN  heparin   Injectable 5000 Unit(s) SubCutaneous every 8 hours  HYDROmorphone  Injectable 0.2 milliGRAM(s) IV Push every 1 hour PRN  LORazepam   Injectable 0.5 milliGRAM(s) IV Push every 4 hours PRN  metoprolol tartrate Injectable 5 milliGRAM(s) IV Push every 6 hours  ondansetron Injectable 4 milliGRAM(s) IV Push every 8 hours PRN      T(C): 36.6 (05-23-22 @ 12:20), Max: 37.3 (05-22-22 @ 15:40)  HR: 109 (05-23-22 @ 12:20) (87 - 112)  BP: 150/82 (05-23-22 @ 12:20) (134/77 - 151/88)  RR: 18 (05-23-22 @ 12:20) (17 - 19)  SpO2: 100% (05-23-22 @ 12:20) (96% - 100%)  Wt(kg): --Vital Signs Last 24 Hrs  T(C): 36.6 (23 May 2022 12:20), Max: 37.3 (22 May 2022 15:40)  T(F): 97.9 (23 May 2022 12:20), Max: 99.1 (22 May 2022 15:40)  HR: 109 (23 May 2022 12:20) (87 - 112)  BP: 150/82 (23 May 2022 12:20) (134/77 - 151/88)  BP(mean): --  RR: 18 (23 May 2022 12:20) (17 - 19)  SpO2: 100% (23 May 2022 12:20) (96% - 100%)    PHYSICAL EXAM:  GENERAL: NAD   EYES: L gaze deviation   CHEST/LUNG: Clear to auscultation bilaterally; No wheeze  HEART: irregular; No murmurs, rubs, or gallops  ABDOMEN: Soft, Nontender, Nondistended; Bowel sounds present  EXTREMITIES:   warm and well perfused, No clubbing, cyanosis, or edema  PSYCH: AAOx0, restless  NEUROLOGY: R arm seems hemiparetic, did withdraw twitch to pain in b/l LE, squeezed L hand, aphasic   + begum    Consultant(s) Notes Reviewed:  [x ] YES  [ ] NO  Care Discussed with Consultants/Other Providers [ x] YES  [ ] NO    LABS:              CAPILLARY BLOOD GLUCOSE                RADIOLOGY & ADDITIONAL TESTS:    Imaging Personally Reviewed:  [ ] YES  [ ] NO

## 2022-05-23 NOTE — PROGRESS NOTE ADULT - PROBLEM SELECTOR PLAN 1
- IV Dilaudid 0.2mg q1 PRN pain/ dyspnea  Bowel regimen while on opiates: Dulcolax Supp PRN  - IV Robinul 0.4mg q4 PRN secretions

## 2022-05-23 NOTE — PROGRESS NOTE ADULT - PROBLEM SELECTOR PROBLEM 6
Prophylactic measure
Advanced care planning/counseling discussion
Prophylactic measure
Acute ischemic left MCA stroke

## 2022-05-23 NOTE — PROGRESS NOTE ADULT - PROBLEM SELECTOR PLAN 7
Per GOC discussion, comfort measures only  Please see separate GOC note.  >16 minutes spent on advanced care planning with family.
Educated patient/family about EOL in terms of what to expect.  Questions answered.  Emotional support provided.    Discussed with primary team regarding goals of care discussion and symptom management    Thank you for allowing us to participate in your patient's care. Please page [phone] for any questions/concerns.

## 2022-05-23 NOTE — PROGRESS NOTE ADULT - PROBLEM SELECTOR PLAN 6
symptom management as above
[facility]  MOLST: DNR/DNI, per micu notes family considering comfort, want to c/w NGT for now, appreciate [facility] recs
Hospice philosophy of care and role/services explained. Family defers hospice referral at this time. Emotional support provided.   >16 minutes spent on advanced care planning with family.
[facility]  MOLST: DNR/DNI, per micu notes family considering comfort, no PEG will need to clarify if in fact the case given all meds currently via NGT

## 2022-05-24 PROCEDURE — 99232 SBSQ HOSP IP/OBS MODERATE 35: CPT

## 2022-05-24 RX ADMIN — Medication 5 MILLIGRAM(S): at 10:43

## 2022-05-24 RX ADMIN — HEPARIN SODIUM 5000 UNIT(S): 5000 INJECTION INTRAVENOUS; SUBCUTANEOUS at 06:18

## 2022-05-24 RX ADMIN — Medication 5 MILLIGRAM(S): at 18:54

## 2022-05-24 RX ADMIN — HYDROMORPHONE HYDROCHLORIDE 0.2 MILLIGRAM(S): 2 INJECTION INTRAMUSCULAR; INTRAVENOUS; SUBCUTANEOUS at 15:20

## 2022-05-24 RX ADMIN — Medication 5 MILLIGRAM(S): at 06:12

## 2022-05-24 RX ADMIN — Medication 0.5 MILLIGRAM(S): at 02:55

## 2022-05-24 RX ADMIN — Medication 5 MILLIGRAM(S): at 23:38

## 2022-05-24 RX ADMIN — Medication 0.5 MILLIGRAM(S): at 10:36

## 2022-05-24 RX ADMIN — HEPARIN SODIUM 5000 UNIT(S): 5000 INJECTION INTRAVENOUS; SUBCUTANEOUS at 15:20

## 2022-05-24 NOTE — PROGRESS NOTE ADULT - SUBJECTIVE AND OBJECTIVE BOX
ROYCEDAJUAN  89y  Female      Patient is a 89y old  Female who presents with a chief complaint of Found Down (23 May 2022 15:26)      INTERVAL HPI/OVERNIGHT EVENTS: No acute events. Pt required IV dilaudid and IV ativan overnight.        REVIEW OF SYSTEMS:  As per hpi    MEDICATIONS (STANDING):   bisacodyl Suppository 10 milliGRAM(s) Rectal daily PRN  glycopyrrolate Injectable 0.4 milliGRAM(s) IV Push every 4 hours PRN  heparin   Injectable 5000 Unit(s) SubCutaneous every 8 hours  HYDROmorphone  Injectable 0.2 milliGRAM(s) IV Push every 1 hour PRN  LORazepam   Injectable 0.5 milliGRAM(s) IV Push every 4 hours PRN  metoprolol tartrate Injectable 5 milliGRAM(s) IV Push every 6 hours  ondansetron Injectable 4 milliGRAM(s) IV Push every 8 hours PRN      T(C): 36.3 (05-24-22 @ 11:18), Max: 36.7 (05-23-22 @ 23:23)  HR: 93 (05-24-22 @ 11:18) (93 - 120)  BP: 134/86 (05-24-22 @ 11:18) (134/86 - 160/90)  RR: 18 (05-24-22 @ 11:18) (17 - 18)  SpO2: 95% (05-24-22 @ 11:18) (94% - 96%)  Wt(kg): --Vital Signs Last 24 Hrs  T(C): 36.3 (24 May 2022 11:18), Max: 36.7 (23 May 2022 23:23)  T(F): 97.4 (24 May 2022 11:18), Max: 98.1 (23 May 2022 23:23)  HR: 93 (24 May 2022 11:18) (93 - 120)  BP: 134/86 (24 May 2022 11:18) (134/86 - 160/90)  BP(mean): --  RR: 18 (24 May 2022 11:18) (17 - 18)  SpO2: 95% (24 May 2022 11:18) (94% - 96%)    PHYSICAL EXAM:  GENERAL: NAD   EYES: L gaze deviation   CHEST/LUNG: Clear to auscultation bilaterally; No wheeze  HEART: irregular; No murmurs, rubs, or gallops  ABDOMEN: Soft, Nontender, Nondistended; Bowel sounds present  EXTREMITIES:   warm and well perfused, No clubbing, cyanosis, or edema  PSYCH: AAOx0, restless  NEUROLOGY: R arm seems hemiparetic, did withdraw twitch to pain in b/l LE  + begum      Consultant(s) Notes Reviewed:  [x ] YES  [ ] NO  Care Discussed with Consultants/Other Providers [ x] YES  [ ] NO    LABS:              CAPILLARY BLOOD GLUCOSE                RADIOLOGY & ADDITIONAL TESTS:    Imaging Personally Reviewed:  [ ] YES  [ ] NO

## 2022-05-24 NOTE — PROGRESS NOTE ADULT - PROBLEM SELECTOR PLAN 5
[facility]  MOLST: DNR/DNI  family opting to remove NGT in line with prior wishes, started on sx management w/ dilaudid/ativan/robinol prn    Discussed with pt's Son Shukri in detail. He is in agreement with inpatient hospice

## 2022-05-25 ENCOUNTER — TRANSCRIPTION ENCOUNTER (OUTPATIENT)
Age: 87
End: 2022-05-25

## 2022-05-25 VITALS
RESPIRATION RATE: 20 BRPM | OXYGEN SATURATION: 96 % | SYSTOLIC BLOOD PRESSURE: 142 MMHG | DIASTOLIC BLOOD PRESSURE: 86 MMHG | HEART RATE: 120 BPM

## 2022-05-25 LAB — SARS-COV-2 RNA SPEC QL NAA+PROBE: SIGNIFICANT CHANGE UP

## 2022-05-25 PROCEDURE — 99232 SBSQ HOSP IP/OBS MODERATE 35: CPT

## 2022-05-25 PROCEDURE — 99231 SBSQ HOSP IP/OBS SF/LOW 25: CPT

## 2022-05-25 RX ORDER — HYDROMORPHONE HYDROCHLORIDE 2 MG/ML
0.2 INJECTION INTRAMUSCULAR; INTRAVENOUS; SUBCUTANEOUS
Qty: 0 | Refills: 0 | DISCHARGE
Start: 2022-05-25

## 2022-05-25 RX ORDER — METOPROLOL TARTRATE 50 MG
5 TABLET ORAL
Qty: 0 | Refills: 0 | DISCHARGE
Start: 2022-05-25

## 2022-05-25 RX ORDER — ROBINUL 0.2 MG/ML
0.4 INJECTION INTRAMUSCULAR; INTRAVENOUS
Qty: 0 | Refills: 0 | DISCHARGE
Start: 2022-05-25

## 2022-05-25 RX ORDER — ONDANSETRON 8 MG/1
4 TABLET, FILM COATED ORAL
Qty: 0 | Refills: 0 | DISCHARGE
Start: 2022-05-25

## 2022-05-25 RX ADMIN — Medication 5 MILLIGRAM(S): at 11:05

## 2022-05-25 RX ADMIN — Medication 5 MILLIGRAM(S): at 17:36

## 2022-05-25 RX ADMIN — Medication 5 MILLIGRAM(S): at 05:32

## 2022-05-25 RX ADMIN — HYDROMORPHONE HYDROCHLORIDE 0.2 MILLIGRAM(S): 2 INJECTION INTRAMUSCULAR; INTRAVENOUS; SUBCUTANEOUS at 17:35

## 2022-05-25 RX ADMIN — HYDROMORPHONE HYDROCHLORIDE 0.2 MILLIGRAM(S): 2 INJECTION INTRAMUSCULAR; INTRAVENOUS; SUBCUTANEOUS at 09:28

## 2022-05-25 RX ADMIN — HYDROMORPHONE HYDROCHLORIDE 0.2 MILLIGRAM(S): 2 INJECTION INTRAMUSCULAR; INTRAVENOUS; SUBCUTANEOUS at 11:09

## 2022-05-25 RX ADMIN — HYDROMORPHONE HYDROCHLORIDE 0.2 MILLIGRAM(S): 2 INJECTION INTRAMUSCULAR; INTRAVENOUS; SUBCUTANEOUS at 15:13

## 2022-05-25 NOTE — DISCHARGE NOTE NURSING/CASE MANAGEMENT/SOCIAL WORK - PATIENT PORTAL LINK FT
You can access the FollowMyHealth Patient Portal offered by [facility] by registering at the following website: [email or web address]. By joining [facility]’s FollowMyHealth portal, you will also be able to view your health information using other applications (apps) compatible with our system.

## 2022-05-25 NOTE — DISCHARGE NOTE NURSING/CASE MANAGEMENT/SOCIAL WORK - NSDCCRNAME_GEN_ALL_CORE_FT
[facility] address: [address] [place] [id number], 5pm  via [facility]  1

## 2022-05-25 NOTE — DISCHARGE NOTE PROVIDER - HOSPITAL COURSE
89F with HTN, HLD, Afib on Xarelto, CAD s/p CABG presenting to the ED 5/13 s/p being found unresponsive at home intubated in ED. Neurology following, CTH revealing large subacute left MCA territory infarct s/p MICU stay for vent and pressors course c/b afib w/ RVR now downgraded to floors. Patient failed TOV on 5/17 and begum was replaced on 5/18.  Family now amenable to dc of NGT and focus on comfort care, d/c to inpatient hospice.      On______, discussed with __________, patient is medically cleared and optimized for discharge today. All medications were reviewed with attending, and sent to mutually agreed upon pharmacy.       89F with HTN, HLD, Afib on Xarelto, CAD s/p CABG presenting to the ED 5/13 s/p being found unresponsive at home intubated in ED. Neurology following, CTH revealing large subacute left MCA territory infarct, not a candidate for tPA since out of window with evidence of large CVA on CT. S/p MICU stay for vent and pressors course c/b afib w/ RVR now downgraded to floors. Patient failed TOV on 5/17 and begum was replaced on 5/18. Palliative team following, GOC discussion on 5/20 with Irineo Hampton, HCP, to move forward with comfort care, discontinued NGT and focus on comfort care to d/c to inpatient hospice.      On______, discussed with __________, patient is medically cleared and optimized for discharge today. All medications were reviewed with attending, and sent to mutually agreed upon pharmacy.   89F with HTN, HLD, Afib on Xarelto, CAD s/p CABG presenting to the ED 5/13 s/p being found unresponsive at home intubated in ED. Neurology following, CTH revealing large subacute left MCA territory infarct, not a candidate for tPA since out of window with evidence of large CVA on CT. S/p MICU stay for vent and pressors course c/b afib w/ RVR now downgraded to floors. Patient failed TOV on 5/17 and begum was replaced on 5/18. Palliative team following, GOC discussion on 5/20 with Irineo Hampton, HCP, to move forward with comfort care, discontinued NGT and focus on comfort care to d/c to inpatient hospice.      On 5/25/22, discussed with Dr. Cruz, patient is medically cleared and optimized for discharge today. All medications were reviewed with attending, and sent to mutually agreed upon pharmacy.

## 2022-05-25 NOTE — DISCHARGE NOTE PROVIDER - NSDCMRMEDTOKEN_GEN_ALL_CORE_FT
cephalexin 500 mg oral tablet: 1 tab(s) orally 2 times a day x 7 days    bisacodyl 10 mg rectal suppository: 1 suppository(ies) rectal once a day, As needed, Constipation  glycopyrrolate 0.2 mg/mL injectable solution: 0.4 milligram(s) Injectable IV push every 4 hours PRN for secretions  HYDROmorphone: 0.2 milligram(s) Injectable IV push every 1 hours, As Needed for work of breathing, air hunger  LORazepam: 0.5 milligram(s) Injectable IV push every 4 hours PRN for agitation or anxiety  metoprolol tartrate 1 mg/mL injectable solution: 5 milligram(s) Injectable IV push every 6 hours  ondansetron 2 mg/mL injectable solution: 4 milligram(s) Injectable IV push every 8 hours, As Needed for nausea or vomiting

## 2022-05-25 NOTE — PROGRESS NOTE ADULT - SUBJECTIVE AND OBJECTIVE BOX
ROYCEDAJUAN  89y  Female      Patient is a 89y old  Female who presents with a chief complaint of Found Down (25 May 2022 15:28)      INTERVAL HPI/OVERNIGHT EVENTS: No acute events overnight. Required prns of dilaudid.         REVIEW OF SYSTEMS:  As per hpi    MEDICATIONS (STANDING):   bisacodyl Suppository 10 milliGRAM(s) Rectal daily PRN  glycopyrrolate Injectable 0.4 milliGRAM(s) IV Push every 4 hours PRN  HYDROmorphone  Injectable 0.2 milliGRAM(s) IV Push every 1 hour PRN  LORazepam   Injectable 0.5 milliGRAM(s) IV Push every 4 hours PRN  metoprolol tartrate Injectable 5 milliGRAM(s) IV Push every 6 hours  ondansetron Injectable 4 milliGRAM(s) IV Push every 8 hours PRN      T(C): 36.6 (05-25-22 @ 11:00), Max: 36.8 (05-25-22 @ 05:00)  HR: 118 (05-25-22 @ 11:00) (110 - 120)  BP: 145/93 (05-25-22 @ 11:00) (131/84 - 154/88)  RR: 20 (05-25-22 @ 11:00) (18 - 20)  SpO2: 96% (05-25-22 @ 11:00) (96% - 97%)  Wt(kg): --Vital Signs Last 24 Hrs  T(C): 36.6 (25 May 2022 11:00), Max: 36.8 (25 May 2022 05:00)  T(F): 97.8 (25 May 2022 11:00), Max: 98.2 (25 May 2022 05:00)  HR: 118 (25 May 2022 11:00) (110 - 120)  BP: 145/93 (25 May 2022 11:00) (131/84 - 154/88)  BP(mean): --  RR: 20 (25 May 2022 11:00) (18 - 20)  SpO2: 96% (25 May 2022 11:00) (96% - 97%)    PHYSICAL EXAM:  GENERAL: NAD   EYES: L gaze deviation   CHEST/LUNG: Clear to auscultation bilaterally; No wheeze  HEART: irregular; No murmurs, rubs, or gallops  ABDOMEN: Soft, Nontender, Nondistended; Bowel sounds present  EXTREMITIES:   warm and well perfused, No clubbing, cyanosis, or edema  PSYCH: AAOx0, restless  NEUROLOGY: R arm seems hemiparetic, did withdraw twitch to pain in b/l LE  + begum    Consultant(s) Notes Reviewed:  [x ] YES  [ ] NO  Care Discussed with Consultants/Other Providers [ x] YES  [ ] NO    LABS:              CAPILLARY BLOOD GLUCOSE                RADIOLOGY & ADDITIONAL TESTS:    Imaging Personally Reviewed:  [ ] YES  [ ] NO

## 2022-05-25 NOTE — PROGRESS NOTE ADULT - PROBLEM SELECTOR PLAN 5
SQ[facility]  MOLST: DNR/DNI  family opting to remove NGT in line with prior wishes, started on sx management w/ dilaudid/ativan/robinol prn    Discussed with pt's Son Shukri in detail. He is in agreement with inpatient hospice  Plan for d/c today to [facility]. ACP to update Son

## 2022-05-25 NOTE — DISCHARGE NOTE PROVIDER - NSDCCPCAREPLAN_GEN_ALL_CORE_FT
PRINCIPAL DISCHARGE DIAGNOSIS  Diagnosis: Acute ischemic left MCA stroke  Assessment and Plan of Treatment:        PRINCIPAL DISCHARGE DIAGNOSIS  Diagnosis: Acute ischemic left MCA stroke  Assessment and Plan of Treatment: You were found unresponsive at home and was brought to the emergency department, a CT of the head was done showing a large left sided stroke. Neurology team following, and stated you were not a candidate for tPA (treatment for blood clot strokes) because you were out of the window of treatment time and because it was a big stroke. Palliative team following, goals of care discussion had with your family including the health care proxy, Irineo Hampton, and together we decided to move forward with comfort care. You are discharged to inpatient hospice for comfort measures.        SECONDARY DISCHARGE DIAGNOSES  Diagnosis: Chronic atrial fibrillation  Assessment and Plan of Treatment: We held your blood thinner as you had a stroke. You are discharged to inpatient hospice for comfort measures. Electrophysiology specialist following, and checked the ICD, transitioned from amio to metoprolol.      Diagnosis: Anxiety  Assessment and Plan of Treatment: During your hospilization you had anxiety, we gave you anxiety medication as needed to calm you. You are discharged to inpatient hospice for comfort measures.    Diagnosis: PNA (pneumonia)  Assessment and Plan of Treatment: A CT chest was done, you were treated with antibiotics for a possible pneumonia found on CT chest scan.     PRINCIPAL DISCHARGE DIAGNOSIS  Diagnosis: Acute ischemic left MCA stroke  Assessment and Plan of Treatment: You were found unresponsive at home and was brought to the emergency department, a CT of the head was done showing a large left sided stroke. Neurology team following, and stated you were not a candidate for tPA (treatment for blood clot strokes) because you were out of the window of treatment time and because it was a big stroke. Palliative team following, goals of care discussion had with your family including the health care proxy, Irineo Hampton, and together we decided to move forward with comfort care. You are discharged to inpatient hospice for comfort measures.        SECONDARY DISCHARGE DIAGNOSES  Diagnosis: Chronic atrial fibrillation  Assessment and Plan of Treatment: We held your blood thinner as you had a stroke. You are discharged to inpatient hospice for comfort measures. Electrophysiology specialist following, and checked the ICD, their recommendations were followed.    Diagnosis: Anxiety  Assessment and Plan of Treatment: During your hospilization you had anxiety, we gave you anxiety medication as needed to calm you. You are discharged to inpatient hospice for comfort measures.    Diagnosis: PNA (pneumonia)  Assessment and Plan of Treatment: A CT chest was done, you were treated with antibiotics for a possible pneumonia found on CT chest scan.

## 2022-05-25 NOTE — PROGRESS NOTE ADULT - SUBJECTIVE AND OBJECTIVE BOX
She has had no meaningful recovery - no speech output, no follow commands, no movement on the right side.      Exam:  MS:  Minimal eye opening to pain.  No verbal output.  No follow commands.   CN: decreased eye closure strength on the right. Decreased corneal response on the right - afferent and efferent.   Dysconjugate gaze with right eye abducted, EOMI. Pupils 4-->3, B.   Motor: moves the left side well - right arm poorly localizing and leg w/d to plantar stimulation.  Right: flaccid with no movement and no response to pain.   Right upgoing toe to plantar stimulation.       CT head reviewed.

## 2022-05-25 NOTE — PROGRESS NOTE ADULT - PROVIDER SPECIALTY LIST ADULT
Electrophysiology
MICU
MICU
Palliative Care
Electrophysiology
MICU
MICU
Neurology
Neurology
Electrophysiology
Neurology
Neurology
Hospitalist
Palliative Care
Hospitalist

## 2022-05-25 NOTE — PROGRESS NOTE ADULT - PROBLEM SELECTOR PROBLEM 1
Acute ischemic left MCA stroke
Dyspnea
Acute ischemic left MCA stroke
Dyspnea

## 2022-05-25 NOTE — PROGRESS NOTE ADULT - REASON FOR ADMISSION
Found Down

## 2022-05-25 NOTE — PROGRESS NOTE ADULT - ASSESSMENT
Ms. Amaro is a 89 yo woman with a large left MCA stroke with global aphasia and right hemiplegia.   I agree with hospice and palliative care.   D/W son.    Thank you    Please call us for any further questions.

## 2022-05-25 NOTE — DISCHARGE NOTE NURSING/CASE MANAGEMENT/SOCIAL WORK - NSDCPEFALRISK_GEN_ALL_CORE
For information on Fall & Injury Prevention, visit: [email or web address] OR  [email or web address] OR  https://www.cdc.gov/steadi/patient.html

## 2022-05-25 NOTE — CHART NOTE - NSCHARTNOTESELECT_GEN_ALL_CORE
EP Tele/Event Note
Event Note
ISTOP Note/Event Note
ISTOP/Event Note
MICU transfer note/Transfer Note
Neurology
TELE note/Event Note
Event Note
Event Note
MAR Accept Note
NGT Placement/Event Note

## 2022-05-25 NOTE — PROGRESS NOTE ADULT - PROBLEM SELECTOR PLAN 2
LV 6, Patient previously on Xarelto, hold A/C at this time in setting of recent large CVA to avoid hemorraghic conversion   - s/p IV amio load, off amio as no oral route   - c/w lopressor IV  - no ac 2/2 CVA and now GOC
CHADSVASC 6, Patient previously on Xarelto, hold A/C at this time in setting of recent large CVA to avoid hemorraghic conversion   - s/p IV amio load, c/w amiodarone 200mg daily via NGT  - c/w Lopressor 12.5mg BID via NGT for goal HR <110s and uptitrate if BP tolerates    - TTE showed LVEF 57%,dilated LA, mod pulm htn, mitral regurg  - EP rec ac if cleared by neurology   - appreciate EP consult    - AICD interrogated with episodes of VT with rapid VR to 200s on 5/13/2022 noted
LV 6, Patient previously on Xarelto, hold A/C at this time in setting of recent large CVA to avoid hemorraghic conversion   - s/p IV amio load, off amio as no oral route   - c/w lopressor IV  - no ac 2/2 CVA and now GOC
CHADSVASC 6, Patient previously on Xarelto, hold A/C at this time in setting of recent large CVA to avoid hemorraghic conversion   - s/p IV amio load, c/w amiodarone 200mg daily via NGT  - c/w Lopressor 12.5mg BID via NGT for goal HR <110s and uptitrate if BP tolerates    - TTE showed LVEF 57%,dilated LA, mod pulm htn, mitral regurg  - EP rec ac if cleared by neurology, not yet given risk of hemorrhagic conversion   - appreciate EP consult    - AICD interrogated with episodes of VT with rapid VR to 200s on 5/13/2022 noted
LV 6, Patient previously on Xarelto, hold A/C at this time in setting of recent large CVA to avoid hemorraghic conversion   - s/p IV amio load, off amio as no oral route   - c/w lopressor IV  - no ac 2/2 CVA and now GOC
- IV Ativan 0.2mg q1 PRN anxiety/agitation/ refractory dyspnea
LV 6, Patient previously on Xarelto, hold A/C at this time in setting of recent large CVA to avoid hemorraghic conversion   - s/p IV amio load, off amio as no oral route   - c/w lopressor IV  - no ac 2/2 CVA and now GOC
- Start IV Ativan 0.2mg q1 PRN anxiety/agitation/ refractory dyspnea; if symptoms are uncontrolled with current dose can increase to IV Ativan 0.5mg PRN

## 2022-05-25 NOTE — CHART NOTE - NSCHARTNOTEFT_GEN_A_CORE
Spoke with Shukri, patient's son, and updated him on plan to discharge to inpatient hospice. Course of hospitalization clarified, he is in agreement with comfort care and transfer to inpatient hospice [facility] [place] ACP - [phone]

## 2022-05-31 ENCOUNTER — NON-APPOINTMENT (OUTPATIENT)
Age: 87
End: 2022-05-31

## 2022-06-01 ENCOUNTER — NON-APPOINTMENT (OUTPATIENT)
Age: 87
End: 2022-06-01

## 2022-06-16 ENCOUNTER — APPOINTMENT (OUTPATIENT)
Dept: CARDIOLOGY | Facility: CLINIC | Age: 87
End: 2022-06-16

## 2022-06-27 ENCOUNTER — APPOINTMENT (OUTPATIENT)
Dept: DERMATOLOGY | Facility: CLINIC | Age: 87
End: 2022-06-27

## 2022-10-04 NOTE — PROGRESS NOTE ADULT - PROBLEM SELECTOR PROBLEM 2
Chronic atrial fibrillation
Detail Level: Generalized
Agitation
Detail Level: Simple
Agitation
Chronic atrial fibrillation

## 2022-10-19 NOTE — ED CDU PROVIDER SUBSEQUENT DAY NOTE - CROS ED ROS STATEMENT
all other ROS negative except as per HPI Griseofulvin Counseling:  I discussed with the patient the risks of griseofulvin including but not limited to photosensitivity, cytopenia, liver damage, nausea/vomiting and severe allergy.  The patient understands that this medication is best absorbed when taken with a fatty meal (e.g., ice cream or french fries).

## 2022-11-03 NOTE — ED PROVIDER NOTE - CPE EDP CARDIAC NORM
----- Message from Sharon Hernandez sent at 11/3/2022  9:40 AM CDT -----  Contact: pt  Pt calling for her MRI results , please call     Confirmed patient's contact info below:  Contact Name: Toya Holbrook  Phone Number: [phone]      
Left a message  
normal...

## 2024-01-22 NOTE — CONSULT NOTE ADULT - PROBLEM SELECTOR RECOMMENDATION 9
1. Leave packing in place for 48 hours. Observe in CDU for any additional bleeding for 3 hours. If no additional bleeding then can dc for oupt follow up with pt private ENT. If re bleeds call ENT.  patient can also make appointment with [facility] ENT after packing is removed for second opinion with Dr. Gusman  Patient/Caregiver provided printed discharge information.

## 2024-06-25 NOTE — ED ADULT NURSE NOTE - PSYCHOSOCIAL WDL
PATIENT:  Francine Liang  AGE:  61 yrs  MEDICAL RECORD #:  [id number]  YOB: 1963     DATE:  6/25/2024  PHYSICIAN: Ryan Mtz M.D.     PROCEDURE: C6-7 left paramedian interlaminar epidural steroid injection under fluoroscopy.     PRE-OP DIAGNOSIS:  Neck Pain/Radiculopathy     POST-OP DIAGNOSIS:  same     HISTORY OF PRESENT ILLNESS:  See office notes. Patient has failed previous less-invasive treatments.     ALLERGIES:  Sulfa antibiotics and Demerol hcl [meperidine]     MEDICATIONS:    No current facility-administered medications for this encounter.        PHYSICAL EXAMINATION:              General:  Awake, alert              Heart:  No audible murmurs, extremities well perfused              Lungs:  No increased WOB or audible wheezing              Extremities:  Normal tone. Warm. No swelling.      Anesthesia: 1 mg Versed and 50 mcg fentanyl    Estimated blood loss: None  Complications: None  Specimen: None    DESCRIPTION OF PROCEDURE:     Components of the procedure were again reviewed with the patient prior to the procedure.  She is aware of risks including infection, bleeding, allergic reaction, and nerve injury.  She had ample opportunity for additional questions.  She elected to proceed with treatment.     The patient was placed in the prone position.  Cardiovascular monitoring was initiated, and vital signs were stable prior to, during, and after the procedure.  Utilizing fluoroscopy, the C6-7 vertebrae were identified.  The area was sterilely prepped and draped. Skin anesthesia was achieved at the entry site using 1-2 cc of Lidocaine 1%. A 22 g 3.5 inch Touhy spinal needle was slowly inserted into the C6-7 interlaminar epidural space using AP, lateral and oblique fluoroscopic imaging and loss of resistance technique. Negative aspiration was confirmed. 1 cc Isovue-M 300 was injected showing contrast spread into the epidural space.  A combination of 1 cc normal saline and 10 mg dexamethasone  Alert and oriented x 3, normal mood and affect, no apparent risk to self or others.
